# Patient Record
Sex: MALE | Race: WHITE | NOT HISPANIC OR LATINO | Employment: OTHER | URBAN - METROPOLITAN AREA
[De-identification: names, ages, dates, MRNs, and addresses within clinical notes are randomized per-mention and may not be internally consistent; named-entity substitution may affect disease eponyms.]

---

## 2017-02-16 ENCOUNTER — HOSPITAL ENCOUNTER (OUTPATIENT)
Dept: PHYSICAL THERAPY | Facility: CLINIC | Age: 67
Setting detail: THERAPIES SERIES
End: 2017-02-16
Attending: PSYCHIATRY & NEUROLOGY
Payer: MEDICARE

## 2017-02-16 PROCEDURE — 97116 GAIT TRAINING THERAPY: CPT | Mod: GP | Performed by: PHYSICAL THERAPIST

## 2017-02-16 PROCEDURE — 97161 PT EVAL LOW COMPLEX 20 MIN: CPT | Mod: GP | Performed by: PHYSICAL THERAPIST

## 2017-02-16 PROCEDURE — 97110 THERAPEUTIC EXERCISES: CPT | Mod: GP | Performed by: PHYSICAL THERAPIST

## 2017-02-16 PROCEDURE — 40000719 ZZHC STATISTIC PT DEPARTMENT NEURO VISIT: Performed by: PHYSICAL THERAPIST

## 2017-02-16 PROCEDURE — G8978 MOBILITY CURRENT STATUS: HCPCS | Mod: GP,CJ | Performed by: PHYSICAL THERAPIST

## 2017-02-16 PROCEDURE — G8979 MOBILITY GOAL STATUS: HCPCS | Mod: GP,CJ | Performed by: PHYSICAL THERAPIST

## 2017-02-16 ASSESSMENT — 6 MINUTE WALK TEST (6MWT): TOTAL DISTANCE WALKED (FT): 1605

## 2017-02-16 NOTE — PROGRESS NOTES
02/16/17 0800   Quick Adds   Type of Visit Initial OP PT Evaluation   General Information   Start of Care Date 02/16/17   Referring Physician Dr. Naima Fuller   Orders Evaluate and Treat as Indicated   Additional Orders Big Program   Order Date 01/31/17   Medical Diagnosis Parkinson's Disease   Onset of illness/injury or Date of Surgery (2010)   Surgical/Medical history reviewed Yes   Pertinent history of current problem (include personal factors and/or comorbidities that impact the POC) Dignosed with PD in 2010. Currently has c/o sitffness, decreased balance and overall slowness with movements. 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 and is enjoying senior living with family [place] and walking 3x/week at the mall with his wife   Current Community Support Family/friend caregiver   Patient role/Employment history Retired   Living environment [facility]   Home/Community Accessibility Comments 3 steps/15 steps within home and reports no difficulty   Assistive Devices Comments no AD   Patient/Family Goals Statement Decrease my stiffness and help my balance and slowness   General Information Comments Carbidopa/levidopa 4x/day- recently decreased dose by 1/2 due to unwanted involuntary facial movements   Fall Risk Screen   Fall screen completed by PT   Per patient - Fall 2 or more times in past year? No   Per patient - Fall with injury in past year? No   Timed Up and Go score (seconds) 6.3   Is patient a fall risk? No   System Outcome Measures   AM-PAC  Basic Mobility Score Level  (Lower scores equate to lower levels of function) 65.88   AM-PAC  Daily Activity Score Level  (Lower scores equate to lower levels of function) 61.18   AM-PAC  Applied Cognitive Score Level  (Lower scores equate to lower levels of function) 65.2   Pain   Patient currently in pain No   Pain comments no pain, just stiffness   Cognitive Status Examination   Orientation orientation to person,  place and time   Level of Consciousness alert   Follows Commands and Answers Questions 100% of the time   Personal Safety and Judgment intact   Memory intact   Observation   Observation Flat affect, reports his voice projection has decreased as a result of not speaking and doing lectures   Posture   Posture Forward head position   Range of Motion (ROM)   ROM Comment lacks full knee extension bilaterally, approximately less than 5 degrees.   Strength   Manual Muscle Testing Quick Adds No deficits were identified   Bed Mobility   Bed Mobility Comments Sit>supine 5.9s; supine>sit 6.0s and reports getting stuck at home if he's too close to the edge.   Transfer Skills   Transfer Comments Requires UE assist for transfers   Gait   Gait Comments Ambulates unaided to clinic, equal step and stride length, limited arm swing bilaterally   Gait Special Tests Six Minute Walk Test   Feet 1605 Feet   Comments Rates effort at 6-7/10   Balance   Balance Comments NBOS EO.EC x30 independently, increased sway with NBOS but remains standing independently   Sensory Examination   Sensory Perception Comments denies N/T   Coordination   Coordination Comments impaired as noted with arm swing during gait, incoordinated with steps   Planned Therapy Interventions   Planned Therapy Interventions balance training;gait training;motor coordination training;ROM;strengthening;stretching;transfer training;manual therapy   Clinical Impression   Criteria for Skilled Therapeutic Interventions Met yes, treatment indicated   PT Diagnosis Difficulty with gait   Influenced by the following impairments impaired coordination and arm swing, generalized slowness of movements, impaired flexiblity and stiffness with mobility, masked facial features    Functional limitations due to impairments difficulty with gait, bed transfers and mobility, difficulty moving into a faster speed.   Clinical Presentation Stable/Uncomplicated   Clinical Presentation Rationale  Progressing as expected   Clinical Decision Making (Complexity) Low complexity   Therapy Frequency other (see comments)  (4x/week per BIG protocol)   Predicted Duration of Therapy Intervention (days/wks) 4 weeks   Risk & Benefits of therapy have been explained Yes   Patient, Family & other staff in agreement with plan of care Yes   Education Assessment   Preferred Learning Style Listening   Barriers to Learning No barriers   Goal 1   Goal Identifier BED MOBILITY   Goal Description 1. Patient will decrease amount of time allowed to ext bed, supine>sit, in less than 5seconds in order to improve efficiency and independence at home.   Target Date 04/17/17   Goal 2   Goal Identifier ARM SWING   Goal Description 2. Patient will demonstrate equal arm swing during gait in order to resume a normal walking pattern   Target Date 04/17/17   Goal 3   Goal Identifier HEP   Goal Description 3. Patient will be independent in a home program including exercises specific to Parkinson's to ease stiffness and improve flexibility, reaction time and general strength   Target Date 04/17/17   Total Evaluation Time   Total Evaluation Time (Minutes) 30   Therapy Certification   Certification date from 02/16/17   Certification date to 04/17/17   Medical Diagnosis Parkinson's Disease

## 2017-02-16 NOTE — PROGRESS NOTES
[facility]        OUTPATIENT PHYSICAL THERAPY FUNCTIONAL EVALUATION  PLAN OF TREATMENT FOR OUTPATIENT REHABILITATION  (COMPLETE FOR INITIAL CLAIMS ONLY)  Patient's Last Name, First Name, M.I.  YOB: 1950  Steven Dye     Provider's Name   [facility]   Medical Record No.  [id number]     Start of Care Date:  02/16/17   Onset Date:   (2010)   Type:     _X__PT   ____OT  ____SLP Medical Diagnosis:  Parkinson's Disease     PT Diagnosis:  Difficulty with gait Visits from SOC:  1                              __________________________________________________________________________________  Plan of Treatment/Functional Goals:  balance training, gait training, motor coordination training, ROM, strengthening, stretching, transfer training, manual therapy           GOALS  BED MOBILITY  1. Patient will decrease amount of time allowed to exit bed, supine>sit, in less than 5seconds in order to improve efficiency and independence at home.  04/17/17    ARM SWING  2. Patient will demonstrate equal arm swing during gait in order to resume a normal walking pattern  04/17/17    HEP  3. Patient will be independent in a home program including exercises specific to Parkinson's to ease stiffness and improve flexibility, reaction time and general strength  04/17/17      Therapy Frequency:  other (see comments) (4x/week per BIG protocol) -->pt starting middle of March per schedule restrictions  Predicted Duration of Therapy Intervention:  4 weeks    Tish Fu, PT                                    I CERTIFY THE NEED FOR THESE SERVICES FURNISHED UNDER        THIS PLAN OF TREATMENT AND WHILE UNDER MY CARE .             Physician Signature               Date    X_____________________________________________________                      Certification Date From:  02/16/17    Certification Date To:  04/17/17    Referring Provider:  Dr. Naima Fuller    Initial Assessment  See Epic Evaluation- Start of Care Date: 02/16/17

## 2017-03-13 ENCOUNTER — HOSPITAL ENCOUNTER (OUTPATIENT)
Dept: PHYSICAL THERAPY | Facility: CLINIC | Age: 67
Setting detail: THERAPIES SERIES
End: 2017-03-13
Attending: PSYCHIATRY & NEUROLOGY
Payer: MEDICARE

## 2017-03-13 PROCEDURE — 97116 GAIT TRAINING THERAPY: CPT | Mod: GP | Performed by: PHYSICAL THERAPIST

## 2017-03-13 PROCEDURE — 40000719 ZZHC STATISTIC PT DEPARTMENT NEURO VISIT: Performed by: PHYSICAL THERAPIST

## 2017-03-13 PROCEDURE — 97110 THERAPEUTIC EXERCISES: CPT | Mod: GP | Performed by: PHYSICAL THERAPIST

## 2017-03-14 ENCOUNTER — HOSPITAL ENCOUNTER (OUTPATIENT)
Dept: OCCUPATIONAL THERAPY | Facility: CLINIC | Age: 67
Setting detail: THERAPIES SERIES
End: 2017-03-14
Attending: PSYCHIATRY & NEUROLOGY
Payer: MEDICARE

## 2017-03-14 PROCEDURE — 97166 OT EVAL MOD COMPLEX 45 MIN: CPT | Mod: GO | Performed by: OCCUPATIONAL THERAPIST

## 2017-03-14 PROCEDURE — 40000125 ZZHC STATISTIC OT OUTPT VISIT: Performed by: OCCUPATIONAL THERAPIST

## 2017-03-14 PROCEDURE — 97535 SELF CARE MNGMENT TRAINING: CPT | Mod: GO | Performed by: OCCUPATIONAL THERAPIST

## 2017-03-14 PROCEDURE — G8987 SELF CARE CURRENT STATUS: HCPCS | Mod: GO,CI | Performed by: OCCUPATIONAL THERAPIST

## 2017-03-14 PROCEDURE — G8988 SELF CARE GOAL STATUS: HCPCS | Mod: GO,CI | Performed by: OCCUPATIONAL THERAPIST

## 2017-03-14 NOTE — PROGRESS NOTES
[facility]          OUTPATIENT OCCUPATIONAL THERAPY  EVALUATION  PLAN OF TREATMENT FOR OUTPATIENT REHABILITATION  (COMPLETE FOR INITIAL CLAIMS ONLY)  Patient's Last Name, First Name, M.I.  YOB: 1950  Steven Dye                        Provider's Name  [facility] Medical Record No.  [id number]                               Onset Date:      (Diagnosed in 2010)   Start of Care Date:     03/14/17   Type:     ___PT   _X_OT   ___SLP Medical Diagnosis:     Parkinson's Disease                          OT Diagnosis:     decreased independence with ADLs Visits from SOC:  1   _________________________________________________________________________________  Plan of Treatment/Functional Goals:  ADL training, IADL training, Balance training, Coordination training, Self care/Home management, Strengthening, Stretching, Therapeutic activities     LSVT BIG program (2x/week shared with PT)              Goals  Goal Identifier: UE dressing  Goal Description: Patient will use increased (normal) amplitude UE movements and speed so that he can don his coat independently in less than 20 seconds (without LOB, without help of spouse, to get to work, etc)  Target Date: 04/13/17     Goal Identifier: UE Movement  Goal Description: Patient will demonstrate faster speed with using UE's to increase ability to reach into cupboard, reach into the fridge,  things from the floor, etc. safely, independently and in a timely manner.  Target Date: 04/13/17     Goal Identifier: FMC  Goal Description: Patient will independently use strategies (BIG movements and hand flicks) to increase coordination to assist with buttoning, dialing a telephone, and FM ADL's by completing the 9HPT in 25 seconds (R hand.)  Target Date: 04/13/17          Therapy Frequency: 2x/week     Predicted Duration of Therapy  Intervention (days/wks): 4  Mignon Katz OT          I CERTIFY THE NEED FOR THESE SERVICES FURNISHED UNDER        THIS PLAN OF TREATMENT AND WHILE UNDER MY CARE .             Physician Signature               Date    X_____________________________________________________                       ,    Certification date from: 03/14/17, Certification date to: 04/13/17               Referring Physician: Dr. Naima Fuller     Initial Assessment        See Epic Evaluation      Start Of Care Date: 03/14/17

## 2017-03-14 NOTE — PROGRESS NOTES
03/14/17 1300   Quick Adds   Quick Adds Certification   Type of Visit Initial Outpatient Occupational Therapy Evaluation   General Information   Start Of Care Date 03/14/17   Referring Physician Dr. Naima Fuller   Orders Evaluate and treat as indicated   Other Orders SLP and PT for Big and Loud program   Orders Date 03/01/17   Medical Diagnosis Parkinson's Disease   Onset of Illness/Injury or Date of Surgery (Diagnosed in 2010)   Surgical/Medical History Reviewed Yes   Additional Occupational Profile Info/Pertinent History of Current Problem Patient referred to outpatient OT for the LSVT BIG program.  Patient was dignosed with PD in 2010. Currently he complains of increased stiffness, decreased balance, incoordination, difficulty donning a coat and overall slowness with movements and speech.   He is on Carbidopa/levidopa 4x/day- (reports recently decreasing dose by 1/2 due to unwanted involuntary facial movements).      Comments/Observations Patient talks softly, flat affect. Reports taking his medications 30 minutes ago.   Role/Living Environment   Current Community Support Family/friend caregiver  (wife)   Patient role/Employment history Retired  (, retired 1 year ago.)   Community/Avocational Activities Patient enjoys family history research, is sedentary and uses the computer daily.  Patient feels he has given up playing sports due to balance (used to play tennis).     Current Living Environment House   Number of Stairs to Enter Home 3   Number of Stairs Within Home 15   Primary Bathroom Set Up/Equipment Tub/Shower combo   Home/Community Accessibility Comments Reports no difficulties with mobility within his home.   Prior Level - Transfers Independent   Prior Level - Ambulation Independent   Prior Level - ADLS Independent   Prior Responsibilities - IADL Meal Preparation;Laundry;Housekeeping;Driving;Shopping;Medication management;Yardwork;Finances   Prior Level Comments Patient reports that  "flipping burgers when grilling is difficult.  Patietn shovels snow, vacuums, cleans his own bathroom.      Current Assistive Devices - Mobility (none)   Current Assistive Devices - ADL (none)   Patient/family Goals Statement To get rid of symptoms of PD, improve my balance and get quicker   Pain   Patient currently in pain No   System Outcome Measures   AM-PAC  Basic Mobility Score Level  (Lower scores equate to lower levels of function) 65.88   AM-PAC  Daily Activity Score Level  (Lower scores equate to lower levels of function) 61.18   AM-PAC  Applied Cognitive Score Level  (Lower scores equate to lower levels of function) 65.2   Cognitive Status Examination   Orientation Orientation to person, place and time   Level of Consciousness Alert   Follows Commands and Answers Questions 100% of the time;Able to follow single-step instructions   Personal Safety and Judgment Intact   Memory Intact   Cognitive Comment Patient doesn't feel PD has affected his cognition.  He reports difficulty remembering names.  He writes things down and has a to-do list.  \"I didn't know that PD affected my reading ability.  I never knew that it would slow down my ability to read.\"  Will continue to monitor.   Visual Perception   Visual Perception (wears contacts)   Visual Perception Comments No new changes.    Sensation   Upper Extremity Sensory Examination No deficits were identified   Range of Motion (ROM)   ROM Quick Adds No deficits identified   Strength   Strength No deficits were identified   Hand Strength   Hand Dominance Right   Left Hand  (pounds) 87 pounds   Right Hand  (pounds) 100 pounds   Hand Strength Comments Has a harder time opening up jars.  Both R and L  strength falls WNL's for his age group.   Coordination   Upper Extremity Coordination Right UE impaired   Left Hand, Nine Hole Peg Test (seconds) 24   Right Hand, Nine Hole Peg Test (seconds) 33   Coordination Comments Min/mod compensation through shoulders " for FMC tasks.  R hand FMC falls more than 2 standard deviations below the mean for his age group.     Functional Mobility   Functional Mobility Comments Small but reciprocal arm swing.  Little head turns with mobility.   Transfer Skill   Level of [place]: Transfers independent   Tub/Shower Transfer   Tub/Shower Transfer Comments Reports no difficulty   Bathing   Level of [place] - Bathing independent   Upper Body Dressing   Level of [place]: Dress Upper Body independent   Upper Body Dressing Comments Reports that he is a little slow getting his L arm through his shirt, reports difficulty with small buttons and donning his coat.   Lower Body Dressing   Level of [place]: Dress Lower Body independent   Lower Body Dressing Comments Sits on EOB to don clothes because he is fearful of falling with standing.   Toileting   Level of [place]: Toilet independent   Grooming   Level of [place]: Grooming independent   Eating/Self-Feeding   Level of [place]: Eating independent   Eating/Self Feeding Comments Reports that he leans forward to the sliverware vs. bringing it to him.     Activity Tolerance   Activity Tolerance Reports increased fatigue through hands with repetition of movements (i.e. using a screwdriver.)   Planned Therapy Interventions   Planned Therapy Interventions ADL training;IADL training;Balance training;Coordination training;Self care/Home management;Strengthening;Stretching;Therapeutic activities   Intervention Comments LSVT BIG program (2x/week shared with PT)   OT Goal 1   Goal Identifier UE dressing   Goal Description Patient will use increased (normal) amplitude UE movements and speed so that he can don his coat independently in less than 20 seconds (without LOB, without help of spouse, to get to work, etc)   Target Date 04/13/17   OT Goal 2   Goal Identifier UE Movement   Goal Description Patient will demonstrate faster speed with using UE's to increase ability to reach  into cupboard, reach into the fridge,  things from the floor, etc. safely, independently and in a timely manner.   Target Date 04/13/17   OT Goal 3   Goal Identifier FM   Goal Description Patient will independently use strategies (BIG movements and hand flicks) to increase coordination to assist with buttoning, dialing a telephone, and FM ADL's by completing the 9HPT in 25 seconds (R hand.)   Target Date 04/13/17   Clinical Impression   Criteria for Skilled Therapeutic Interventions Met Yes, treatment indicated   OT Diagnosis decreased independence with ADLs   Influenced by the following impairments PD symptoms, slowness, decreased balance, decreased coordinatiopn   Assessment of Occupational Performance 1-3 Performance Deficits   Identified Performance Deficits difficulty with all ADLs, decreased participation in leisure activities   Clinical Decision Making (Complexity) Moderate complexity   Therapy Frequency 2x/week   Predicted Duration of Therapy Intervention (days/wks) 4   Risks and Benefits of Treatment have been explained. Yes   Patient, Family & other staff in agreement with plan of care Yes   Education Assessment   Barriers To Learning No Barriers   Therapy Certification   Certification date from 03/14/17   Certification date to 04/13/17   Total Evaluation Time   Total Evaluation Time 30

## 2017-03-15 ENCOUNTER — HOSPITAL ENCOUNTER (OUTPATIENT)
Dept: PHYSICAL THERAPY | Facility: CLINIC | Age: 67
Setting detail: THERAPIES SERIES
End: 2017-03-15
Attending: PSYCHIATRY & NEUROLOGY
Payer: MEDICARE

## 2017-03-15 PROCEDURE — 40000719 ZZHC STATISTIC PT DEPARTMENT NEURO VISIT: Performed by: PHYSICAL THERAPIST

## 2017-03-15 PROCEDURE — 97110 THERAPEUTIC EXERCISES: CPT | Mod: GP | Performed by: PHYSICAL THERAPIST

## 2017-03-15 PROCEDURE — 97116 GAIT TRAINING THERAPY: CPT | Mod: GP | Performed by: PHYSICAL THERAPIST

## 2017-03-17 ENCOUNTER — HOSPITAL ENCOUNTER (OUTPATIENT)
Dept: OCCUPATIONAL THERAPY | Facility: CLINIC | Age: 67
Setting detail: THERAPIES SERIES
End: 2017-03-17
Attending: PSYCHIATRY & NEUROLOGY
Payer: MEDICARE

## 2017-03-17 PROCEDURE — 97530 THERAPEUTIC ACTIVITIES: CPT | Mod: GO | Performed by: OCCUPATIONAL THERAPIST

## 2017-03-17 PROCEDURE — 97535 SELF CARE MNGMENT TRAINING: CPT | Mod: GO,59 | Performed by: OCCUPATIONAL THERAPIST

## 2017-03-17 PROCEDURE — 40000125 ZZHC STATISTIC OT OUTPT VISIT: Performed by: OCCUPATIONAL THERAPIST

## 2017-03-17 PROCEDURE — 97110 THERAPEUTIC EXERCISES: CPT | Mod: GO | Performed by: OCCUPATIONAL THERAPIST

## 2017-03-20 ENCOUNTER — HOSPITAL ENCOUNTER (OUTPATIENT)
Dept: PHYSICAL THERAPY | Facility: CLINIC | Age: 67
Setting detail: THERAPIES SERIES
End: 2017-03-20
Attending: PSYCHIATRY & NEUROLOGY
Payer: MEDICARE

## 2017-03-20 ENCOUNTER — ALLIED HEALTH/NURSE VISIT (OUTPATIENT)
Dept: CARDIOLOGY | Facility: CLINIC | Age: 67
End: 2017-03-20
Payer: COMMERCIAL

## 2017-03-20 DIAGNOSIS — Z95.0 CARDIAC PACEMAKER IN SITU: Primary | ICD-10-CM

## 2017-03-20 PROCEDURE — 93293 PM PHONE R-STRIP DEVICE EVAL: CPT | Performed by: INTERNAL MEDICINE

## 2017-03-20 PROCEDURE — 97110 THERAPEUTIC EXERCISES: CPT | Mod: GP | Performed by: PHYSICAL THERAPIST

## 2017-03-20 PROCEDURE — 97112 NEUROMUSCULAR REEDUCATION: CPT | Mod: GP | Performed by: PHYSICAL THERAPIST

## 2017-03-20 PROCEDURE — 97116 GAIT TRAINING THERAPY: CPT | Mod: GP | Performed by: PHYSICAL THERAPIST

## 2017-03-20 PROCEDURE — 40000719 ZZHC STATISTIC PT DEPARTMENT NEURO VISIT: Performed by: PHYSICAL THERAPIST

## 2017-03-20 NOTE — MR AVS SNAPSHOT
After Visit Summary   3/20/2017    Steven Dye    MRN: [id number]           Patient Information     Date Of Birth          1950        Visit Information        Provider Department      3/20/2017 9:30 AM CASEY TECH1 [facility] [place]        Today's Diagnoses     Cardiac pacemaker in situ    -  1       Follow-ups after your visit        Your next 10 appointments already scheduled     Mar 20, 2017 10:00 AM CDT   Big and loud treatment with Tish Fu, PT   [facility] Physical Therapy ([facility])    [address] [place] [address]  [address]  [place] [address]   [phone]            Mar 21, 2017 11:30 AM CDT   Big and loud treatment with Mignon Katz, OT   [facility] Occupational Therapy ([facility])    [address] [place] [address]  [address]  [place] [address]   [phone]            Mar 22, 2017 11:30 AM CDT   Big and loud treatment with Tish Fu, PT   [facility] Physical Therapy ([facility])    [address] [place] [address]  [address]  [place] [address]   [phone]            Mar 24, 2017  8:00 AM CDT   Big and loud treatment with Mignon Katz, OT   [facility] Occupational Therapy ([facility])    [address] [place] [address]  [address]  [place] [address]   [phone]            Mar 27, 2017 10:00 AM CDT   Big and loud treatment with Tish Fu, PT   [facility] ([facility])    [address] [place] [address] [address]  [place] [address]   [phone]            Mar 28, 2017  2:00 PM CDT   Big and loud treatment with Mignon Katz, OT   [facility] Occupational Therapy ([facility])    [address] [place] [address]  [address]  [place] [address]   [phone]            Mar 29, 2017 10:30 AM CDT   Big and loud treatment with Tish Fu, PT   [facility] ([facility])    [address] [place] [address]  [address]  [place] [address]   [phone]          "   Mar 30, 2017 10:30 AM CDT   Big and loud treatment with Tishtd Fu, PT   [facility] Physical Therapy ([facility])    [address]  [address]  [place] [address]   [phone]            Apr 03, 2017 10:30 AM CDT   Big and loud treatment with Tishtd Fu, PT   [facility] Physical Therapy ([facility])    [address]  [address]  [place] [address]   [phone]            Apr 05, 2017 10:30 AM CDT   Big and loud treatment with Tish Fu, PT   [facility] Physical Therapy ([facility])    [address]  [address]  [place] [address]   [phone]              Who to contact     If you have questions or need follow up information about today's clinic visit or your schedule please contact [facility] AT [place] directly at [phone].  Normal or non-critical lab and imaging results will be communicated to you by [employer] K[employer]art, letter or phone within 4 business days after the clinic has received the results. If you do not hear from us within 7 days, please contact the clinic through [employer]itt[employer] or phone. If you have a critical or abnormal lab result, we will notify you by phone as soon as possible.  Submit refill requests through Kitchfix or call your pharmacy and they will forward the refill request to us. Please allow 3 business days for your refill to be completed.          Additional Information About Your Visit        Kitchfix Information     Kitchfix lets you send messages to your doctor, view your test results, renew your prescriptions, schedule appointments and more. To sign up, go to [email or web address] . Click on \"Log in\" on the left side of the screen, which will take you to the Welcome page. Then click on \"Sign up Now\" on the right side of the page.     You will be asked to enter the access code listed below, as well as some personal information. Please follow the directions to create your username and " password.     Your access code is: [email or web address]  Expires: 2017  9:33 AM     Your access code will  in 90 days. If you need help or a new code, please call your [place] clinic or [phone].        Care EveryWhere ID     This is your Care EveryWhere ID. This could be used by other organizations to access your [place] medical records  [id number]         Blood Pressure from Last 3 Encounters:   16 95/65   10/30/15 112/70   14 110/66    Weight from Last 3 Encounters:   16 95 kg (209 lb 6.4 oz)   10/30/15 95.4 kg (210 lb 6.4 oz)   14 95.7 kg (211 lb)              We Performed the Following     PM PHONE R STRIP EVAL UP TO 90 DAYS ([phone])        Primary Care Provider Office Phone # Fax #    Robert Flores [phone]       [facility] [address] [facility] [address]        Thank you!     Thank you for choosing [facility] AT [place]  for your care. Our goal is always to provide you with excellent care. Hearing back from our patients is one way we can continue to improve our services. Please take a few minutes to complete the written survey that you may receive in the mail after your visit with us. Thank you!             Your Updated Medication List - Protect others around you: Learn how to safely use, store and throw away your medicines at www.disposemymeds.org.          This list is accurate as of: 3/20/17  9:33 AM.  Always use your most recent med list.                   Brand Name Dispense Instructions for use    amLODIPine 5 MG tablet    NORVASC     Take 5 mg by mouth daily       carbidopa-levodopa  MG per tablet    SINEMET     Take 1 tablet by mouth 4 times daily       lisinopril 20 MG tablet    PRINIVIL/ZESTRIL     Take 20 mg by mouth       metoprolol 50 MG 24 hr tablet    TOPROL XL    30 tablet    Take 1 tablet (50 mg) by mouth daily

## 2017-03-20 NOTE — PROGRESS NOTES
~ 90 day office teletrace ~  Appropriate AS/ at time of check. Magnet response WNL. Six month threshold scheduled in June. Jackie BEATTY

## 2017-03-21 ENCOUNTER — HOSPITAL ENCOUNTER (OUTPATIENT)
Dept: OCCUPATIONAL THERAPY | Facility: CLINIC | Age: 67
Setting detail: THERAPIES SERIES
End: 2017-03-21
Attending: PSYCHIATRY & NEUROLOGY
Payer: MEDICARE

## 2017-03-21 PROCEDURE — 97535 SELF CARE MNGMENT TRAINING: CPT | Mod: GO,59 | Performed by: OCCUPATIONAL THERAPIST

## 2017-03-21 PROCEDURE — 97110 THERAPEUTIC EXERCISES: CPT | Mod: GO | Performed by: OCCUPATIONAL THERAPIST

## 2017-03-21 PROCEDURE — 40000125 ZZHC STATISTIC OT OUTPT VISIT: Performed by: OCCUPATIONAL THERAPIST

## 2017-03-21 PROCEDURE — 97530 THERAPEUTIC ACTIVITIES: CPT | Mod: GO | Performed by: OCCUPATIONAL THERAPIST

## 2017-03-22 ENCOUNTER — HOSPITAL ENCOUNTER (OUTPATIENT)
Dept: PHYSICAL THERAPY | Facility: CLINIC | Age: 67
Setting detail: THERAPIES SERIES
End: 2017-03-22
Attending: PSYCHIATRY & NEUROLOGY
Payer: MEDICARE

## 2017-03-22 PROCEDURE — 97116 GAIT TRAINING THERAPY: CPT | Mod: GP | Performed by: PHYSICAL THERAPIST

## 2017-03-22 PROCEDURE — 97112 NEUROMUSCULAR REEDUCATION: CPT | Mod: GP | Performed by: PHYSICAL THERAPIST

## 2017-03-22 PROCEDURE — 97110 THERAPEUTIC EXERCISES: CPT | Mod: GP | Performed by: PHYSICAL THERAPIST

## 2017-03-22 PROCEDURE — 40000719 ZZHC STATISTIC PT DEPARTMENT NEURO VISIT: Performed by: PHYSICAL THERAPIST

## 2017-03-24 ENCOUNTER — HOSPITAL ENCOUNTER (OUTPATIENT)
Dept: OCCUPATIONAL THERAPY | Facility: CLINIC | Age: 67
Setting detail: THERAPIES SERIES
End: 2017-03-24
Attending: PSYCHIATRY & NEUROLOGY
Payer: MEDICARE

## 2017-03-24 PROCEDURE — 40000125 ZZHC STATISTIC OT OUTPT VISIT: Performed by: OCCUPATIONAL THERAPIST

## 2017-03-24 PROCEDURE — 97530 THERAPEUTIC ACTIVITIES: CPT | Mod: GO | Performed by: OCCUPATIONAL THERAPIST

## 2017-03-24 PROCEDURE — 97110 THERAPEUTIC EXERCISES: CPT | Mod: GO | Performed by: OCCUPATIONAL THERAPIST

## 2017-03-27 ENCOUNTER — HOSPITAL ENCOUNTER (OUTPATIENT)
Dept: SPEECH THERAPY | Facility: CLINIC | Age: 67
Setting detail: THERAPIES SERIES
End: 2017-03-27
Attending: PSYCHIATRY & NEUROLOGY
Payer: MEDICARE

## 2017-03-27 ENCOUNTER — HOSPITAL ENCOUNTER (OUTPATIENT)
Dept: PHYSICAL THERAPY | Facility: CLINIC | Age: 67
Setting detail: THERAPIES SERIES
End: 2017-03-27
Attending: PSYCHIATRY & NEUROLOGY
Payer: MEDICARE

## 2017-03-27 PROCEDURE — 92524 BEHAVRAL QUALIT ANALYS VOICE: CPT | Mod: GN | Performed by: SPEECH-LANGUAGE PATHOLOGIST

## 2017-03-27 PROCEDURE — 40000719 ZZHC STATISTIC PT DEPARTMENT NEURO VISIT: Performed by: PHYSICAL THERAPIST

## 2017-03-27 PROCEDURE — G9172 VOICE GOAL STATUS: HCPCS | Mod: GN,CI | Performed by: SPEECH-LANGUAGE PATHOLOGIST

## 2017-03-27 PROCEDURE — 97110 THERAPEUTIC EXERCISES: CPT | Mod: GP | Performed by: PHYSICAL THERAPIST

## 2017-03-27 PROCEDURE — 97116 GAIT TRAINING THERAPY: CPT | Mod: GP | Performed by: PHYSICAL THERAPIST

## 2017-03-27 PROCEDURE — G9171 VOICE CURRENT STATUS: HCPCS | Mod: GN,CI | Performed by: SPEECH-LANGUAGE PATHOLOGIST

## 2017-03-27 PROCEDURE — 40000211 ZZHC STATISTIC SLP  DEPARTMENT VISIT: Performed by: SPEECH-LANGUAGE PATHOLOGIST

## 2017-03-27 NOTE — PROGRESS NOTES
[facility]          OUTPATIENT SPEECH LANGUAGE PATHOLOGY VOICE EVALUATION  PLAN OF TREATMENT FOR OUTPATIENT REHABILITATION  (COMPLETE FOR INITIAL CLAIMS ONLY)    Patient's Last Name, First Name, M.I.  YOB: 1950  HardikSteven Tandred                        Provider s Name: [facility] Medical Record No.  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 SLP feels he will benefit, instead, from a similar but modified program to increase strength of voice through loud exercises and breath support training, and to train improved consistency of volume across tasks      Goals     1. Goal Identifier: Volume - structured       Goal Description: Patient will maintain a vocal volume of >70dB SPL with min cues across a 10 minute structured speech task (e.g. reading aloud) to meet daily communication needs.        Target Date: 06/25/17   2. Goal Identifier: Volume - unstructured       Goal Description: Patient will reach a target volume of >70 dB SPL on a spontaneous, unstructured speech task for up to 10 minutes (e.g. conversation) with min cues to meet daily expressive communication needs.        Target Date: 06/25/17   3. Goal Identifier: Pitch       Goal Description: Patient will increase pitch change on targeted emotional statements in 90% of opportunities, as judged by SLP, to meet daily pragmatic communication needs.        Target Date: 06/25/17   4. Goal Identifier: Voice hygiene       Goal Description: Patient will  learn and demostrate use of 3 vocal hygiene strategies to improve voice quality for everyday speech tasks.        Target Date: 06/25/17       Ivelisse Saeed, SLP       I CERTIFY THE NEED FOR THESE SERVICES FURNISHED UNDER        THIS PLAN OF TREATMENT AND WHILE UNDER MY CARE .             Physician Signature               Date    X_____________________________________________________                    Dr. Naima Fuller  Certification Date From:  03/27/17  Certification Date To:  06/25/17                 Initial Assessment        See Epic Evaluation Start of Care

## 2017-03-27 NOTE — PROGRESS NOTES
"[facility] Outpatient Voice Evaluation   03/27/17 0900   General Information   Type Of Visit Initial   Start Of Care Date 03/27/17   Referring Physician Dr. Naima Fuller   Orders Evaluate And Treat   Orders Comment Big and Loud Program   Medical Diagnosis Parkinson's disease   Onset Of Illness/injury Or Date Of Surgery 03/01/17  (Order, diagnosed with PD in 2010)   Avocational voice uses Singing, socializing, [employer]   Pertinent History Of Current Problem 66-year-old male referred for LSVT BIG and LOUD program for treatment of Parkinson's related symptoms. Patient describing the following voice and speech symptoms: reduced breath support for speech, excessive effort to achieve loudness, and inconsistency in voice quality.     Prior Level of Functioning No previous problems.   Patient Role/employment History Retired  (former  at [employer])   Living environment (Lives with wife)   General Observations Patient reports he has been diligent about complying with LSVT Big home program expectations   Patient/family Goals \"To be more consistent.\"    General Information Comments Patient has never had voice therapy before. Reports he quit singing in choir but may wish to pursue something like that again in the future.    Personal Rating / Voice Use Rating   Describe the amount of voice use required for your job Very little   Describe the intensity of voice use required for your job Soft and quiet   Describe the amount of typical non-work voice use Very little   Describe the intensity of typical non-work voice use Moderate   Evaluation Results   Voice Observations Vocal quality notable for hoarseness and breathiness. Structured tasks such as reading and recorded conversation were WNL for volume, however, patient does have some drops in volume when answering off-the-cuff questions or offering spontaneous responses. Patient able to increase volume without excessive strain.  Range on pitch " tasks is good, but patient endorsing the both high and low ends of range are challenging. Speech intelligibility is mildly reduced due to smaller movement of articulators.     Voice Profile during conversation, 1 min monologue and paragraph reading   Volume Too quiet  (approx. 30% of the time.)   Comments Voice Handicap Index score = 41   Compression Strength of Vocal Folds / Larynx Muscles   Vowels average volume dB 72  (16 seconds)   Reading aloud average volume dB 74   Conversation average volume dB 74   Efficiency of increased volume Relaxed throat   Function of Lengtheners / Shorteners (CT and TA Muscles)   Pitch glides Other  (WNL, but patient reporting difficulty with upper and lower)   General Therapy Interventions   Planned Therapy Interventions Voice   Voice Larynx strengthening;Voice quality/pitch or volume tasks   Intervention Comments Patient may not need entire LSVT LOUD protocol at this time. SLP feels he will benefit, instead, from a modified program to increase strength of voice through exercises to promote loudness and breath support training, and to train improved consistency of volume across tasks.   Impressions and Recommendations   Communication Diagnosis Mild hypokinetic dysarthria   Summary Mr. 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 These deficits reduce his overall speech intelligibility approximately 30% of the time. Patient is highly motivated to improve communication. At present, SLP does not feel patient requires full LSVT LOUD intervention. Rather, recommending a similar treatment approach with less intensity and focusing mainly on building strength to sustain good voice volume, improving consistency of volume across tasks, and improving pitch to counter monotone expression. RECOMMEND: Skilled voice therapy intervention 3x/week for 2 weeks,  then 2x/week for 2 more weeks, then 1x/week for 6 weeks to meet goals below. Patient to be seen 60 mins per session.    Prognosis  Good with intervention   Risks and Benefits of Treatment have been explained. Yes   Patient & /or Caregiver  in agreement with plan of care Yes   Patient Education Mr. Dye was verbally educated on treatment plan.    Educational Assessment   Barriers to Learning No barriers   Preferred Learning Style Listening;Reading;Demonstration;Pictures / Video   Voice Goals   Voice Goals 1;2;3;4   Voice Goal 1   Goal Identifier Volume - structured   Goal Description Patient will maintain a vocal volume of >70dB SPL with min cues across a 10 minute structured speech task (e.g. reading aloud) to meet daily communication needs.    Target Date 06/25/17   Voice Goal 2   Goal Identifier Volume - unstructured   Goal Description Patient will reach a target volume of >70 dB SPL on a spontaneous, unstructured speech task for up to 10 minutes (e.g. conversation) with min cues to meet daily expressive communication needs.    Target Date 06/25/17   Voice Goal 3   Goal Identifier Pitch   Goal Description Patient will increase pitch change to meet targeted range on emotional statements in 90% of opportunities, as judged by SLP, to meet daily pragmatic communication needs.    Target Date 06/25/17   Voice Goal 4   Goal Identifier Voice hygiene   Goal Description Patient will learn and demonstrate use of 3 vocal hygiene strategies to improve voice quality for everyday speech tasks.    Target Date 06/25/17   Total Session Time   Total Session Time 35   Total Evaluation Time 30   Therapy Certification   Certification date from 03/27/17   Certification date to 06/25/17   Medical Diagnosis Parkinson's disease   SLP Medicare Only G-code   G-code Voice   Voice   Voice: Current Status , Goal , Discharge -Yjpy Only-Modifier the same for all G-codes CI: 1-19% impairment   Voice: Current & Discharge Modifier  Rationale-Eval Only Goal: CI 1-19% per patient report, SLP evaluation and observations     Thank you for your referral of this patient.      Ivelisse Saeed MA, CCC-SLP  Speech-Language Pathology  [facility]  Phone: [phone]  Pager: [phone]

## 2017-03-28 ENCOUNTER — HOSPITAL ENCOUNTER (OUTPATIENT)
Dept: OCCUPATIONAL THERAPY | Facility: CLINIC | Age: 67
Setting detail: THERAPIES SERIES
End: 2017-03-28
Attending: PSYCHIATRY & NEUROLOGY
Payer: MEDICARE

## 2017-03-28 PROCEDURE — 97530 THERAPEUTIC ACTIVITIES: CPT | Mod: GO | Performed by: OCCUPATIONAL THERAPIST

## 2017-03-28 PROCEDURE — 97110 THERAPEUTIC EXERCISES: CPT | Mod: GO | Performed by: OCCUPATIONAL THERAPIST

## 2017-03-28 PROCEDURE — 40000125 ZZHC STATISTIC OT OUTPT VISIT: Performed by: OCCUPATIONAL THERAPIST

## 2017-03-29 ENCOUNTER — HOSPITAL ENCOUNTER (OUTPATIENT)
Dept: PHYSICAL THERAPY | Facility: CLINIC | Age: 67
Setting detail: THERAPIES SERIES
End: 2017-03-29
Attending: PSYCHIATRY & NEUROLOGY
Payer: MEDICARE

## 2017-03-29 PROCEDURE — 97110 THERAPEUTIC EXERCISES: CPT | Mod: GP | Performed by: PHYSICAL THERAPIST

## 2017-03-29 PROCEDURE — 40000719 ZZHC STATISTIC PT DEPARTMENT NEURO VISIT: Performed by: PHYSICAL THERAPIST

## 2017-03-29 PROCEDURE — 97116 GAIT TRAINING THERAPY: CPT | Mod: GP | Performed by: PHYSICAL THERAPIST

## 2017-03-30 ENCOUNTER — HOSPITAL ENCOUNTER (OUTPATIENT)
Dept: SPEECH THERAPY | Facility: CLINIC | Age: 67
Setting detail: THERAPIES SERIES
End: 2017-03-30
Attending: PSYCHIATRY & NEUROLOGY
Payer: MEDICARE

## 2017-03-30 ENCOUNTER — HOSPITAL ENCOUNTER (OUTPATIENT)
Dept: PHYSICAL THERAPY | Facility: CLINIC | Age: 67
Setting detail: THERAPIES SERIES
End: 2017-03-30
Attending: PSYCHIATRY & NEUROLOGY
Payer: MEDICARE

## 2017-03-30 PROCEDURE — 92507 TX SP LANG VOICE COMM INDIV: CPT | Mod: GN | Performed by: SPEECH-LANGUAGE PATHOLOGIST

## 2017-03-30 PROCEDURE — 97112 NEUROMUSCULAR REEDUCATION: CPT | Mod: GP | Performed by: PHYSICAL THERAPIST

## 2017-03-30 PROCEDURE — 97116 GAIT TRAINING THERAPY: CPT | Mod: GP | Performed by: PHYSICAL THERAPIST

## 2017-03-30 PROCEDURE — 40000719 ZZHC STATISTIC PT DEPARTMENT NEURO VISIT: Performed by: PHYSICAL THERAPIST

## 2017-03-30 PROCEDURE — 97110 THERAPEUTIC EXERCISES: CPT | Mod: GP,59 | Performed by: PHYSICAL THERAPIST

## 2017-03-30 PROCEDURE — 40000211 ZZHC STATISTIC SLP  DEPARTMENT VISIT: Performed by: SPEECH-LANGUAGE PATHOLOGIST

## 2017-03-31 ENCOUNTER — HOSPITAL ENCOUNTER (OUTPATIENT)
Dept: SPEECH THERAPY | Facility: CLINIC | Age: 67
Setting detail: THERAPIES SERIES
End: 2017-03-31
Attending: PSYCHIATRY & NEUROLOGY
Payer: MEDICARE

## 2017-03-31 PROCEDURE — 40000211 ZZHC STATISTIC SLP  DEPARTMENT VISIT: Performed by: SPEECH-LANGUAGE PATHOLOGIST

## 2017-03-31 PROCEDURE — 92507 TX SP LANG VOICE COMM INDIV: CPT | Mod: GN | Performed by: SPEECH-LANGUAGE PATHOLOGIST

## 2017-04-03 ENCOUNTER — HOSPITAL ENCOUNTER (OUTPATIENT)
Dept: SPEECH THERAPY | Facility: CLINIC | Age: 67
Setting detail: THERAPIES SERIES
End: 2017-04-03
Attending: PSYCHIATRY & NEUROLOGY
Payer: MEDICARE

## 2017-04-03 ENCOUNTER — HOSPITAL ENCOUNTER (OUTPATIENT)
Dept: PHYSICAL THERAPY | Facility: CLINIC | Age: 67
Setting detail: THERAPIES SERIES
End: 2017-04-03
Attending: PSYCHIATRY & NEUROLOGY
Payer: MEDICARE

## 2017-04-03 PROCEDURE — 97110 THERAPEUTIC EXERCISES: CPT | Mod: GP | Performed by: PHYSICAL THERAPIST

## 2017-04-03 PROCEDURE — 40000211 ZZHC STATISTIC SLP  DEPARTMENT VISIT: Performed by: SPEECH-LANGUAGE PATHOLOGIST

## 2017-04-03 PROCEDURE — 40000719 ZZHC STATISTIC PT DEPARTMENT NEURO VISIT: Performed by: PHYSICAL THERAPIST

## 2017-04-03 PROCEDURE — 92507 TX SP LANG VOICE COMM INDIV: CPT | Mod: GN | Performed by: SPEECH-LANGUAGE PATHOLOGIST

## 2017-04-03 PROCEDURE — 97112 NEUROMUSCULAR REEDUCATION: CPT | Mod: GP | Performed by: PHYSICAL THERAPIST

## 2017-04-04 ENCOUNTER — HOSPITAL ENCOUNTER (OUTPATIENT)
Dept: SPEECH THERAPY | Facility: CLINIC | Age: 67
Setting detail: THERAPIES SERIES
End: 2017-04-04
Attending: PSYCHIATRY & NEUROLOGY
Payer: MEDICARE

## 2017-04-04 PROCEDURE — 40000211 ZZHC STATISTIC SLP  DEPARTMENT VISIT: Performed by: SPEECH-LANGUAGE PATHOLOGIST

## 2017-04-04 PROCEDURE — 92507 TX SP LANG VOICE COMM INDIV: CPT | Mod: GN | Performed by: SPEECH-LANGUAGE PATHOLOGIST

## 2017-04-05 ENCOUNTER — HOSPITAL ENCOUNTER (OUTPATIENT)
Dept: PHYSICAL THERAPY | Facility: CLINIC | Age: 67
Setting detail: THERAPIES SERIES
End: 2017-04-05
Attending: PSYCHIATRY & NEUROLOGY
Payer: MEDICARE

## 2017-04-05 ENCOUNTER — HOSPITAL ENCOUNTER (OUTPATIENT)
Dept: SPEECH THERAPY | Facility: CLINIC | Age: 67
Setting detail: THERAPIES SERIES
End: 2017-04-05
Attending: PSYCHIATRY & NEUROLOGY
Payer: MEDICARE

## 2017-04-05 PROCEDURE — 40000719 ZZHC STATISTIC PT DEPARTMENT NEURO VISIT: Performed by: PHYSICAL THERAPIST

## 2017-04-05 PROCEDURE — 97110 THERAPEUTIC EXERCISES: CPT | Mod: GP,59 | Performed by: PHYSICAL THERAPIST

## 2017-04-05 PROCEDURE — 40000211 ZZHC STATISTIC SLP  DEPARTMENT VISIT: Performed by: SPEECH-LANGUAGE PATHOLOGIST

## 2017-04-05 PROCEDURE — 97112 NEUROMUSCULAR REEDUCATION: CPT | Mod: GP,59 | Performed by: PHYSICAL THERAPIST

## 2017-04-05 PROCEDURE — 92507 TX SP LANG VOICE COMM INDIV: CPT | Mod: GN | Performed by: SPEECH-LANGUAGE PATHOLOGIST

## 2017-04-05 NOTE — PROGRESS NOTES
Outpatient Physical Therapy Progress Note     Patient: Steven Dye  : 1950    Beginning/End Dates of Reporting Period:  17 to 2017    Referring Provider: Dr. Naima Fuller    Therapy Diagnosis: difficulty with gait     Client Self Report: I'm doing pretty well    Goals:  Goal Identifier BED MOBILITY   Goal Description 1. Patient will decrease amount of time allowed to ext bed, supine>sit, in less than 5seconds in order to improve efficiency and independence at home.   Target Date 17   Date Met  17   Progress: GOAL MET     Goal Identifier ARM SWING   Goal Description 2. Patient will demonstrate equal arm swing during gait in order to resume a normal walking pattern   Target Date 17   Date Met      Progress:           Progress Toward Goals:   Progress this reporting period: 1 of 2 goals met and patient has made excellent progress in his 10 therapy sessions since evaluation. Tim continues to improve his awareness in his left arm swing and is incorporating the principles of the LVST BIG program into his daily life. He has one additional treatment session before discharge.    Plan:  Continue therapy per current plan of care.    Discharge:  No

## 2017-04-06 ENCOUNTER — HOSPITAL ENCOUNTER (OUTPATIENT)
Dept: PHYSICAL THERAPY | Facility: CLINIC | Age: 67
Setting detail: THERAPIES SERIES
End: 2017-04-06
Attending: PSYCHIATRY & NEUROLOGY
Payer: MEDICARE

## 2017-04-06 PROCEDURE — 97110 THERAPEUTIC EXERCISES: CPT | Mod: GP | Performed by: PHYSICAL THERAPIST

## 2017-04-06 PROCEDURE — G8979 MOBILITY GOAL STATUS: HCPCS | Mod: GP,CJ | Performed by: PHYSICAL THERAPIST

## 2017-04-06 PROCEDURE — 40000719 ZZHC STATISTIC PT DEPARTMENT NEURO VISIT: Performed by: PHYSICAL THERAPIST

## 2017-04-06 PROCEDURE — G8980 MOBILITY D/C STATUS: HCPCS | Mod: GP,CJ | Performed by: PHYSICAL THERAPIST

## 2017-04-06 NOTE — PROGRESS NOTES
Outpatient Physical Therapy Discharge Note     Patient: Steven Dye  : 1950    Beginning/End Dates of Reporting Period:  17 to 2017    Referring Provider: Dr. Naima Fuller    Therapy Diagnosis: Difficultly with gait     Client Self Report: I feel good.     Objective Measurements:    Outcome Measures (most recent score):  AM-PAC  Basic Mobility Score Level  (Lower scores equate to lower levels of function): 70.78  AM-PAC  Daily Activity Score Level  (Lower scores equate to lower levels of function): 49.96  AM-PAC  Applied Cognitive Score Level  (Lower scores equate to lower levels of function): 56.94      Goals:  Goal Identifier BED MOBILITY   Goal Description 1. Patient will decrease amount of time allowed to ext bed, supine>sit, in less than 5seconds in order to improve efficiency and independence at home.   Target Date 17   Date Met  17   Progress:     Goal Identifier ARM SWING   Goal Description 2. Patient will demonstrate equal arm swing during gait in order to resume a normal walking pattern   Target Date 17   Date Met  17   Progress:     Goal Identifier HEP   Goal Description 3. Patient will be independent in a home program including exercises specific to Parkinson's to ease stiffness and improve flexibility, reaction time and general strength   Target Date 17   Date Met  17   Progress:     ALL GOALS MET      Progress Toward Goals: Tim has been seen in PT for 11 sessions and he's completed the PT portion of the LVST BIG program with excellent success. He's self calibrating the amplitude of his movements during gait, specifically arm swing, bed mobility and supine<>stand floor transfers. He's also showing improved awareness in the loudness of his voice. All goals have been met and he improved by 200 feet on the 6MWT. He plans to continue with his exercises daily as prescribed.       Progress this reporting period: all goals met    Plan:  Discharge  from therapy.    Discharge:    Reason for Discharge: Patient has met all goals.    Equipment Issued: HEP    Discharge Plan: Patient to continue home program.

## 2017-04-10 ENCOUNTER — HOSPITAL ENCOUNTER (OUTPATIENT)
Dept: SPEECH THERAPY | Facility: CLINIC | Age: 67
Setting detail: THERAPIES SERIES
End: 2017-04-10
Attending: PSYCHIATRY & NEUROLOGY
Payer: MEDICARE

## 2017-04-10 PROCEDURE — 40000211 ZZHC STATISTIC SLP  DEPARTMENT VISIT: Performed by: SPEECH-LANGUAGE PATHOLOGIST

## 2017-04-10 PROCEDURE — 92507 TX SP LANG VOICE COMM INDIV: CPT | Mod: GN | Performed by: SPEECH-LANGUAGE PATHOLOGIST

## 2017-04-11 ENCOUNTER — HOSPITAL ENCOUNTER (OUTPATIENT)
Dept: OCCUPATIONAL THERAPY | Facility: CLINIC | Age: 67
Setting detail: THERAPIES SERIES
End: 2017-04-11
Attending: PSYCHIATRY & NEUROLOGY
Payer: MEDICARE

## 2017-04-11 PROCEDURE — 40000125 ZZHC STATISTIC OT OUTPT VISIT: Performed by: OCCUPATIONAL THERAPIST

## 2017-04-11 PROCEDURE — 97530 THERAPEUTIC ACTIVITIES: CPT | Mod: GO | Performed by: OCCUPATIONAL THERAPIST

## 2017-04-11 PROCEDURE — G8989 SELF CARE D/C STATUS: HCPCS | Mod: GO,CI | Performed by: OCCUPATIONAL THERAPIST

## 2017-04-11 PROCEDURE — 97110 THERAPEUTIC EXERCISES: CPT | Mod: GO | Performed by: OCCUPATIONAL THERAPIST

## 2017-04-11 PROCEDURE — G8988 SELF CARE GOAL STATUS: HCPCS | Mod: GO,CI | Performed by: OCCUPATIONAL THERAPIST

## 2017-04-11 NOTE — PROGRESS NOTES
"Outpatient Occupational Therapy Discharge Note     Patient: Marlo Baker  : 1950  Insurance:   Payor/Plan Subscriber Name Rel Member # Group #   MEDICARE - [place]* MARLO BAKER*  [id number]       ATTN CLAIMS, [address]   BCBS - [employer] [place] * MARLO BAKER*  [id number]      [address] [address]       Beginning/End Dates of Reporting Period:  3/14/2017 to 2017    Referring Provider: Dr. Naima Fuller    Therapy Diagnosis: Parkinson's Disease    Client Self Report: \"Overall, I am moving a lot faster.  I have more stamina and I am more efficient with getting jackets on and getting out of bed. I am more consious of how I am moving.\"     Objective Measurements:     Objective Measure: 9HPT   Details: R: 26 seconds (increased from 33 seconds on eval);  L:  24 seconds (no change)             Outcome Measures (most recent score):  AM-PAC  Basic Mobility Score Level  (Lower scores equate to lower levels of function): 70.78  AM-PAC  Daily Activity Score Level  (Lower scores equate to lower levels of function): 49.96  AM-PAC  Applied Cognitive Score Level  (Lower scores equate to lower levels of function): 56.94      Goals:     Goal Identifier UE dressing   Goal Description Patient will use increased (normal) amplitude UE movements and speed so that he can don his coat independently in less than 20 seconds (without LOB, without help of spouse, to get to work, etc)   Target Date 17   Date Met   17   Progress:    Goal met.  Patient can don/doff his coat in 10 seconds using big movements which is half the time he completed on eval.       Goal Identifier UE Movement   Goal Description Patient will demonstrate faster speed with using UE's to increase ability to reach into cupboard, reach into the fridge,  things from the floor, etc. safely, independently and in a timely manner.   Target Date 17   Date Met   17   Progress:  Goal met.     Goal Identifier [facility]   Goal " Description Patient will independently use strategies (BIG movements and hand flicks) to increase coordination to assist with buttoning, dialing a telephone, and FM ADL's by completing the 9HPT in 25 seconds (R hand.)   Target Date 04/13/17   Date Met   4/11/17   Progress:  Goal met.         Progress Toward Goals:   Progress this reporting period: Patient was seen for OT eval and 6 treatment sessions.  He has completed the full LSVT-BIG program in conjunction with PT (4x/week x 4 weeks) and has met all of his goals.  Patient has made gains with FMC, arm swing, awareness of posture and mobility, and endurance.  He is now I with all of the LSVT maximal daily exercises.  He continues to have challenges with dual tasks and maintaining big with multitasking.    Plan:  Discharge from therapy.    Discharge:    Reason for Discharge: Patient has met all goals.    Equipment Issued: None    Discharge Plan: Patient to continue home program.

## 2017-04-12 ENCOUNTER — HOSPITAL ENCOUNTER (OUTPATIENT)
Dept: SPEECH THERAPY | Facility: CLINIC | Age: 67
Setting detail: THERAPIES SERIES
End: 2017-04-12
Attending: PSYCHIATRY & NEUROLOGY
Payer: MEDICARE

## 2017-04-12 PROCEDURE — 40000211 ZZHC STATISTIC SLP  DEPARTMENT VISIT: Performed by: SPEECH-LANGUAGE PATHOLOGIST

## 2017-04-12 PROCEDURE — 92507 TX SP LANG VOICE COMM INDIV: CPT | Mod: GN | Performed by: SPEECH-LANGUAGE PATHOLOGIST

## 2017-04-14 ENCOUNTER — HOSPITAL ENCOUNTER (OUTPATIENT)
Dept: SPEECH THERAPY | Facility: CLINIC | Age: 67
Setting detail: THERAPIES SERIES
End: 2017-04-14
Attending: PSYCHIATRY & NEUROLOGY
Payer: MEDICARE

## 2017-04-14 PROCEDURE — 40000211 ZZHC STATISTIC SLP  DEPARTMENT VISIT: Performed by: SPEECH-LANGUAGE PATHOLOGIST

## 2017-04-14 PROCEDURE — 92507 TX SP LANG VOICE COMM INDIV: CPT | Mod: GN | Performed by: SPEECH-LANGUAGE PATHOLOGIST

## 2017-04-17 ENCOUNTER — HOSPITAL ENCOUNTER (OUTPATIENT)
Dept: SPEECH THERAPY | Facility: CLINIC | Age: 67
Setting detail: THERAPIES SERIES
End: 2017-04-17
Attending: PSYCHIATRY & NEUROLOGY
Payer: MEDICARE

## 2017-04-17 PROCEDURE — 40000211 ZZHC STATISTIC SLP  DEPARTMENT VISIT: Performed by: SPEECH-LANGUAGE PATHOLOGIST

## 2017-04-17 PROCEDURE — 92507 TX SP LANG VOICE COMM INDIV: CPT | Mod: GN,KX | Performed by: SPEECH-LANGUAGE PATHOLOGIST

## 2017-04-19 ENCOUNTER — HOSPITAL ENCOUNTER (OUTPATIENT)
Dept: SPEECH THERAPY | Facility: CLINIC | Age: 67
Setting detail: THERAPIES SERIES
End: 2017-04-19
Attending: PSYCHIATRY & NEUROLOGY
Payer: MEDICARE

## 2017-04-19 PROCEDURE — 40000211 ZZHC STATISTIC SLP  DEPARTMENT VISIT: Performed by: SPEECH-LANGUAGE PATHOLOGIST

## 2017-04-19 PROCEDURE — 92507 TX SP LANG VOICE COMM INDIV: CPT | Mod: GN,KX | Performed by: SPEECH-LANGUAGE PATHOLOGIST

## 2017-04-21 ENCOUNTER — HOSPITAL ENCOUNTER (OUTPATIENT)
Dept: SPEECH THERAPY | Facility: CLINIC | Age: 67
Setting detail: THERAPIES SERIES
End: 2017-04-21
Attending: PSYCHIATRY & NEUROLOGY
Payer: MEDICARE

## 2017-04-21 PROCEDURE — 92507 TX SP LANG VOICE COMM INDIV: CPT | Mod: GN | Performed by: SPEECH-LANGUAGE PATHOLOGIST

## 2017-04-21 PROCEDURE — 40000211 ZZHC STATISTIC SLP  DEPARTMENT VISIT: Performed by: SPEECH-LANGUAGE PATHOLOGIST

## 2017-04-24 ENCOUNTER — HOSPITAL ENCOUNTER (OUTPATIENT)
Dept: SPEECH THERAPY | Facility: CLINIC | Age: 67
Setting detail: THERAPIES SERIES
End: 2017-04-24
Attending: PSYCHIATRY & NEUROLOGY
Payer: MEDICARE

## 2017-04-24 PROCEDURE — 40000211 ZZHC STATISTIC SLP  DEPARTMENT VISIT: Performed by: SPEECH-LANGUAGE PATHOLOGIST

## 2017-04-24 PROCEDURE — 92507 TX SP LANG VOICE COMM INDIV: CPT | Mod: GN,KX | Performed by: SPEECH-LANGUAGE PATHOLOGIST

## 2017-04-26 ENCOUNTER — HOSPITAL ENCOUNTER (OUTPATIENT)
Dept: SPEECH THERAPY | Facility: CLINIC | Age: 67
Setting detail: THERAPIES SERIES
End: 2017-04-26
Attending: PSYCHIATRY & NEUROLOGY
Payer: MEDICARE

## 2017-04-26 PROCEDURE — G9173 VOICE D/C STATUS: HCPCS | Mod: GN,CI | Performed by: SPEECH-LANGUAGE PATHOLOGIST

## 2017-04-26 PROCEDURE — 40000211 ZZHC STATISTIC SLP  DEPARTMENT VISIT: Performed by: SPEECH-LANGUAGE PATHOLOGIST

## 2017-04-26 PROCEDURE — G9172 VOICE GOAL STATUS: HCPCS | Mod: GN,CI | Performed by: SPEECH-LANGUAGE PATHOLOGIST

## 2017-04-26 PROCEDURE — 92507 TX SP LANG VOICE COMM INDIV: CPT | Mod: GN,KX | Performed by: SPEECH-LANGUAGE PATHOLOGIST

## 2017-04-26 NOTE — PROGRESS NOTES
Outpatient Speech Language Pathology Discharge Note     Patient: Steven Dye  : 1950    Beginning/End Dates of Reporting Period:  3/27/17 to 2017, total of 13 sessions    Referring Provider: Dr. Sabrina Diaz    Therapy Diagnosis: Mild hypokinetic dysarthria    Client Self Report: Patient said that he feels his voice is stronger and he has more consistency with a louder vocal volume. He reports he feels treatment has been helpful to his voice.     Objective Measurements: See below:      Goals:  Goal Identifier Volume - structured   Goal Description Patient will maintain a vocal volume of >70dB SPL with min cues across a 10 minute structured speech task (e.g. reading aloud) to meet daily communication needs.    Target Date 17   Date Met  17   Progress: Goal met and dismissed. 73 dB across 15 min complex reading task.      Goal Identifier Volume - unstructured   Goal Description Patient will reach a target volume of >70 dB SPL on a spontaneous, unstructured speech task for up to 10 minutes (e.g. conversation) with min cues to meet daily expressive communication needs.    Target Date 17   Date Met  17   Progress: Goal met and dismissed. 71 dB across 15 min complex speech task.      Goal Identifier Pitch   Goal Description Patient will increase pitch change on targeted emotional statements in 90% of opportunities, as judged by SLP, to meet daily pragmatic communication needs.    Target Date 17   Date Met  17   Progress: Goal met and dismissed. Increased emotional affect and facial expression in >90% of opportunities, as judged by SLP during extended conversation task.      Goal Identifier Voice hygiene   Goal Description Patient will learn and demostrate use of 3 vocal hygiene strategies to improve voice quality for everyday speech tasks.    Target Date 17   Date Met  17   Progress: Goal met and dismissed.      Progress Toward Goals:    Progress this  "reporting period: Excellent. Mr. Dye has remained highly motivated across treatment to address his voice and communication goals. Pt has made excellent progress toward assigned goals utilizing the LSVT Loud protocol. Mr. Dye is reporting improved ability to communicate effectively with family at home and in community settings with familiar and unfamiliar listeners. Average loudness on spontaneous speaking tasks (conversation, off-the-cuff questions) at time of discharge was 71 dB SLP on a complex task with distractors. Patient also demonstrating improved affect in voice to help express emotion in conversation with no cues. Endurance for longer speaking tasks at an increased volume has improved to the point that patient could participate in 50 mins of conversation at final session with min report of vocal fatigue vs. start of treatment when patient complained of running out of breath or \"forcing words out\" on short speaking tasks.  Plan: Discharge from therapy.     Discharge: Yes.     Reason for Discharge: Patient has met all goals    Discharge Plan: Patient recommended to continue home treatment plan for maintenance of gains made during SLP intervention.      Thank you for your referral of this patient.  It was a pleasure getting to know Mr. Dye and working with him on his voice goals.     Ivelisse Saeed MA, CCC-SLP  Speech-Language Pathology  [facility]  Phone: [phone]  Pager: [phone]    "

## 2017-06-19 ENCOUNTER — ALLIED HEALTH/NURSE VISIT (OUTPATIENT)
Dept: CARDIOLOGY | Facility: CLINIC | Age: 67
End: 2017-06-19
Payer: COMMERCIAL

## 2017-06-19 DIAGNOSIS — Z95.0 CARDIAC PACEMAKER IN SITU: Primary | ICD-10-CM

## 2017-06-19 PROCEDURE — 93280 PM DEVICE PROGR EVAL DUAL: CPT | Performed by: INTERNAL MEDICINE

## 2017-06-19 NOTE — MR AVS SNAPSHOT
After Visit Summary   6/19/2017    Steven Dye    MRN: [id number]           Patient Information     Date Of Birth          1950        Visit Information        Provider Department      6/19/2017 9:00 AM CARMELA LIVINGSTON[facility]        Today's Diagnoses     Cardiac pacemaker in situ    -  1       Follow-ups after your visit        Additional Services     Follow-Up with Cardiac Advanced Practice Provider                 Your next 10 appointments already scheduled     Sep 21, 2017 11:45 AM CDT   Teletrace with CARMELA TECH2   [facility] ([facility])    [address] [place] [address] [address]  [facility] [id number] [phone]            Sep 21, 2017 12:30 PM CDT   [facility] EP RETURN with Janki Casarez PA-C   [facility] ([facility])    [address] [place] [address] [address]  [facility] [id number] [phone]              Future tests that were ordered for you today     Open Future Orders        Priority Expected Expires Ordered    Follow-Up with Cardiac Advanced Practice Provider Routine 9/19/2017 6/19/2018 6/19/2017            Who to contact     If you have questions or need follow up information about today's clinic visit or your schedule please contact [facility] directly at [phone].  Normal or non-critical lab and imaging results will be communicated to you by MyChart, letter or phone within 4 business days after the clinic has received the results. If you do not hear from us within 7 days, please contact the clinic through MyChart or phone. If you have a critical or abnormal lab result, we will notify you by phone as soon as possible.  Submit refill requests through [employer] or call your pharmacy and they will forward the refill request to us. Please allow 3 business days for your refill to be completed.           "Additional Information About Your Visit        MyChart Information     [employer] lets you send messages to your doctor, view your test results, renew your prescriptions, schedule appointments and more. To sign up, go to [email or web address] . Click on \"Log in\" on the left side of the screen, which will take you to the Welcome page. Then click on \"Sign up Now\" on the right side of the page.     You will be asked to enter the access code listed below, as well as some personal information. Please follow the directions to create your username and password.     Your access code is: [email or web address]  Expires: 2017  9:33 AM     Your access code will  in 90 days. If you need help or a new code, please call your [facility] clinic or [phone].        Care EveryWhere ID     This is your Care EveryWhere ID. This could be used by other organizations to access your [facility] medical records  [id number]         Blood Pressure from Last 3 Encounters:   16 95/65   10/30/15 112/70   14 110/66    Weight from Last 3 Encounters:   16 95 kg (209 lb 6.4 oz)   10/30/15 95.4 kg (210 lb 6.4 oz)   14 95.7 kg (211 lb)              We Performed the Following     PM DEVICE PROGRAMMING EVAL, DUAL LEAD PACER (26031)        Primary Care Provider    None Specified       No primary provider on file.        Thank you!     Thank you for choosing [facility] [place]  for your care. Our goal is always to provide you with excellent care. Hearing back from our patients is one way we can continue to improve our services. Please take a few minutes to complete the written survey that you may receive in the mail after your visit with us. Thank you!             Your Updated Medication List - Protect others around you: Learn how to safely use, store and throw away your medicines at www.disposemymeds.org.          This list is accurate as of: 17  9:33 AM.  Always use your most recent med " list.                   Brand Name Dispense Instructions for use    amLODIPine 5 MG tablet    NORVASC     Take 5 mg by mouth daily       carbidopa-levodopa  MG per tablet    SINEMET     Take 1 tablet by mouth 4 times daily       lisinopril 20 MG tablet    PRINIVIL/ZESTRIL     Take 20 mg by mouth       metoprolol 50 MG 24 hr tablet    TOPROL XL    30 tablet    Take 1 tablet (50 mg) by mouth daily

## 2017-06-19 NOTE — PROGRESS NOTES
[employer]ru[employer] Pacemaker Device Check  AP: 6 % : 100 %  Mode: DDDR  bpm        Underlying Rhythm: CHB with no junctional escape  Heart Rate: Heart rate stable with good variability. Patient denies any activity intolerance.  Sensing: WNL    Pacing Threshold: WNL   Impedance: WNL  Battery Status: Approximately >5 years longevity.  Atrial Arrhythmia: 0  Ventricular Arrhythmia: 0  Setting Change: 0    Care Plan: Follow up with Q 3 month office teletrace. DINA Angulo RN

## 2017-09-21 ENCOUNTER — OFFICE VISIT (OUTPATIENT)
Dept: CARDIOLOGY | Facility: CLINIC | Age: 67
End: 2017-09-21
Attending: INTERNAL MEDICINE
Payer: COMMERCIAL

## 2017-09-21 ENCOUNTER — ALLIED HEALTH/NURSE VISIT (OUTPATIENT)
Dept: CARDIOLOGY | Facility: CLINIC | Age: 67
End: 2017-09-21
Payer: COMMERCIAL

## 2017-09-21 VITALS
SYSTOLIC BLOOD PRESSURE: 112 MMHG | HEART RATE: 62 BPM | HEIGHT: 73 IN | BODY MASS INDEX: 27.5 KG/M2 | WEIGHT: 207.5 LBS | DIASTOLIC BLOOD PRESSURE: 74 MMHG

## 2017-09-21 DIAGNOSIS — I10 ESSENTIAL HYPERTENSION WITH GOAL BLOOD PRESSURE LESS THAN 140/90: ICD-10-CM

## 2017-09-21 DIAGNOSIS — Z95.0 CARDIAC PACEMAKER IN SITU: Primary | ICD-10-CM

## 2017-09-21 DIAGNOSIS — I44.2 COMPLETE AV BLOCK (H): ICD-10-CM

## 2017-09-21 DIAGNOSIS — I45.10 RBBB: ICD-10-CM

## 2017-09-21 PROCEDURE — 93293 PM PHONE R-STRIP DEVICE EVAL: CPT | Performed by: INTERNAL MEDICINE

## 2017-09-21 PROCEDURE — 99213 OFFICE O/P EST LOW 20 MIN: CPT | Performed by: PHYSICIAN ASSISTANT

## 2017-09-21 RX ORDER — LISINOPRIL/HYDROCHLOROTHIAZIDE 10-12.5 MG
1 TABLET ORAL DAILY
Qty: 90 TABLET | Refills: 3 | Status: SHIPPED | OUTPATIENT
Start: 2017-09-21 | End: 2018-09-14

## 2017-09-21 RX ORDER — AMLODIPINE BESYLATE 5 MG/1
5 TABLET ORAL DAILY
Qty: 90 TABLET | Refills: 3 | Status: SHIPPED | OUTPATIENT
Start: 2017-09-21 | End: 2018-09-14

## 2017-09-21 RX ORDER — METOPROLOL SUCCINATE 50 MG/1
50 TABLET, EXTENDED RELEASE ORAL DAILY
Qty: 90 TABLET | Refills: 3 | Status: SHIPPED | OUTPATIENT
Start: 2017-09-21 | End: 2018-09-14

## 2017-09-21 RX ORDER — LISINOPRIL/HYDROCHLOROTHIAZIDE 10-12.5 MG
1 TABLET ORAL DAILY
COMMUNITY
End: 2017-09-21

## 2017-09-21 NOTE — MR AVS SNAPSHOT
"              After Visit Summary   9/21/2017    Steven Dye    MRN: [id number]           Patient Information     Date Of Birth          1950        Visit Information        Provider Department      9/21/2017 11:45 AM CASEY TECH2 [facility]        Today's Diagnoses     Cardiac pacemaker in situ    -  1       Follow-ups after your visit        Your next 10 appointments already scheduled     Sep 21, 2017 12:30 PM CDT   [facility] EP RETURN with Janki Casarez PA-C   [facility] ([facility])    [address] [place] [address] [address]  [facility] [id number] [phone]            Dec 22, 2017  8:15 AM CST   Pacemaker Check with CARMELA LIVINGSTONN   [facility] ([facility])    [address] [place] [address] [address]  [facility] [id number] [phone]              Who to contact     If you have questions or need follow up information about today's clinic visit or your schedule please contact [facility] directly at [phone].  Normal or non-critical lab and imaging results will be communicated to you by lancers Inchart, letter or phone within 4 business days after the clinic has received the results. If you do not hear from us within 7 days, please contact the clinic through [employer] or phone. If you have a critical or abnormal lab result, we will notify you by phone as soon as possible.  Submit refill requests through "[employer]" or call your pharmacy and they will forward the refill request to us. Please allow 3 business days for your refill to be completed.          Additional Information About Your Visit        lancers Inchart Information     "[employer]" lets you send messages to your doctor, view your test results, renew your prescriptions, schedule appointments and more. To sign up, go to [email or web address]" . Click on \"Log in\" on the left side of the screen, " "which will take you to the Welcome page. Then click on \"Sign up Now\" on the right side of the page.     You will be asked to enter the access code listed below, as well as some personal information. Please follow the directions to create your username and password.     Your access code is: [email or web address]  Expires: 2017 11:54 AM     Your access code will  in 90 days. If you need help or a new code, please call your [place] clinic or [phone].        Care EveryWhere ID     This is your Care EveryWhere ID. This could be used by other organizations to access your [place] medical records  [id number]         Blood Pressure from Last 3 Encounters:   16 95/65   10/30/15 112/70   14 110/66    Weight from Last 3 Encounters:   16 95 kg (209 lb 6.4 oz)   10/30/15 95.4 kg (210 lb 6.4 oz)   14 95.7 kg (211 lb)              We Performed the Following     PM PHONE R STRIP EVAL UP TO 90 DAYS ([id number])        Primary Care Provider    None Specified       No primary provider on file.        Equal Access to Services     [facility] AH: Hadii randolph castellanos Soniall, waaxda ludoriadaha, qaybta kaalmada adeaydenda, ana velazquez . So [facility] [phone].    ATENCIÓN: Si habla español, tiene a topete disposición servicios gratuitos de asistencia lingüística. Llame al [phone].    We comply with applicable federal civil rights laws and [place] laws. We do not discriminate on the basis of race, color, national origin, age, disability sex, sexual orientation or gender identity.            Thank you!     Thank you for choosing [facility] AT [place]  for your care. Our goal is always to provide you with excellent care. Hearing back from our patients is one way we can continue to improve our services. Please take a few minutes to complete the written survey that you may receive in the mail after your visit with us. Thank you!             Your Updated " Medication List - Protect others around you: Learn how to safely use, store and throw away your medicines at www.disposemymeds.org.          This list is accurate as of: 9/21/17 11:54 AM.  Always use your most recent med list.                   Brand Name Dispense Instructions for use Diagnosis    amLODIPine 5 MG tablet    NORVASC     Take 5 mg by mouth daily        carbidopa-levodopa  MG per tablet    SINEMET     Take 1 tablet by mouth 4 times daily        lisinopril 20 MG tablet    PRINIVIL/ZESTRIL     Take 20 mg by mouth        metoprolol 50 MG 24 hr tablet    TOPROL XL    30 tablet    Take 1 tablet (50 mg) by mouth daily    Essential hypertension with goal blood pressure less than 140/90

## 2017-09-21 NOTE — PROGRESS NOTES
HPI:   I met Steven today when he came for annual follow-up. He is a pleasant 66-year-old with a history of hypertension, dyslipidemia, Parkinson's disease and syncope in 2011 concerning for high degree AV block. He had a dual-chamber [employer] pacemaker placed 4/2011.    At his last appointment with Dr. Cervantes in 9/2016, metoprolol was decreased from 50 twice a day to 50 mg daily due to relative hypotension.    Annual pacemaker interrogation today showed 6% A Pacing and 100% V pacing with an underlying rhythm of complete heart block with no junctional escape. He had no atrial or ventricular arrhythmias noted.    Last echocardiogram done 4/2011 showed a normal ejection fraction of 60-65%. His atrial septum was aneurysmal.    He gets routine blood work done through [facility]. In 2/2017, BMP and CBC were normal, total cholesterol 165, triglycerides 159, HDL 33 and  mg/dL.      Assessment & Plan:    1. Syncope - Lou due to complete heart block, now status post pacemaker 4/2011. He has not had recurrent syncope, and is doing well. He will continue routine device checks through our office.    2. HTN - at his last appointment a year ago Dr. Cervantes decreased metoprolol succinate to 50 mg daily. Blood pressure today continues to look excellent. We will not make any changes and he will continue metoprolol, lisinopril and amlodipine. These have been refilled per his request as he is in between Primary Care Providers.      He will see Dr. Cervantes in 1 year with a repeat echocardiogram given his 100% RV pacing but call in the interim with any questions or concerns.      Oumou Casarez PA-C, MSPAS      Orders Placed This Encounter   Procedures     Follow-Up with Electrophysiologist     EKG 12-lead complete w/read - Clinics (to be scheduled)     Echocardiogram     Orders Placed This Encounter   Medications     DISCONTD: lisinopril-hydrochlorothiazide (PRINZIDE/ZESTORETIC) 10-12.5 MG per tablet     Sig: Take 1 tablet by  mouth daily     ROPINIROLE HCL PO     Sig: Take by mouth 3 times daily     metoprolol (TOPROL XL) 50 MG 24 hr tablet     Sig: Take 1 tablet (50 mg) by mouth daily     Dispense:  90 tablet     Refill:  3     lisinopril-hydrochlorothiazide (PRINZIDE/ZESTORETIC) 10-12.5 MG per tablet     Sig: Take 1 tablet by mouth daily     Dispense:  90 tablet     Refill:  3     amLODIPine (NORVASC) 5 MG tablet     Sig: Take 1 tablet (5 mg) by mouth daily     Dispense:  90 tablet     Refill:  3     Medications Discontinued During This Encounter   Medication Reason     lisinopril (PRINIVIL,ZESTRIL) 20 MG tablet Discontinued by another Health Care Provider     metoprolol (TOPROL XL) 50 MG 24 hr tablet Reorder     lisinopril-hydrochlorothiazide (PRINZIDE/ZESTORETIC) 10-12.5 MG per tablet Reorder     amLODIPine (NORVASC) 5 MG tablet Reorder         Encounter Diagnoses   Name Primary?     Cardiac pacemaker in situ Yes     RBBB      Complete AV block (H)      Essential hypertension with goal blood pressure less than 140/90        CURRENT MEDICATIONS:  Current Outpatient Prescriptions   Medication Sig Dispense Refill     ROPINIROLE HCL PO Take by mouth 3 times daily       metoprolol (TOPROL XL) 50 MG 24 hr tablet Take 1 tablet (50 mg) by mouth daily 90 tablet 3     lisinopril-hydrochlorothiazide (PRINZIDE/ZESTORETIC) 10-12.5 MG per tablet Take 1 tablet by mouth daily 90 tablet 3     amLODIPine (NORVASC) 5 MG tablet Take 1 tablet (5 mg) by mouth daily 90 tablet 3     carbidopa-levodopa (SINEMET)  MG per tablet Take 1 tablet by mouth 4 times daily       [DISCONTINUED] lisinopril-hydrochlorothiazide (PRINZIDE/ZESTORETIC) 10-12.5 MG per tablet Take 1 tablet by mouth daily       [DISCONTINUED] metoprolol (TOPROL XL) 50 MG 24 hr tablet Take 1 tablet (50 mg) by mouth daily 30 tablet 11     [DISCONTINUED] amLODIPine (NORVASC) 5 MG tablet Take 5 mg by mouth daily         ALLERGIES     Allergies   Allergen Reactions     Hmg-Coa-R Inhibitors   "    Penicillamine Rash       PAST MEDICAL HISTORY:  Past Medical History:   Diagnosis Date     Complete AV block (H)     [employer] dual chamber 4/1/2011     Hyperlipidemia      Hypertension      Parkinson disease (H)      Syncope        PAST SURGICAL HISTORY:  Past Surgical History:   Procedure Laterality Date     IMPLANT PACEMAKER         FAMILY HISTORY:  Family History   Problem Relation Age of Onset     C.A.D. Mother      C.A.D. Father        SOCIAL HISTORY:  Social History     Social History     Marital status:      Spouse name: N/A     Number of children: N/A     Years of education: N/A     Social History Main Topics     Smoking status: Never Smoker     Smokeless tobacco: Never Used     Alcohol use Yes     Drug use: None     Sexual activity: Not Asked     Other Topics Concern     Special Diet No     Exercise Yes     walking almost everyday     Social History Narrative       Review of Systems:  Skin:  Negative     Eyes:  Positive for contacts  ENT:  Negative    Respiratory:  Negative for dyspnea on exertion;shortness of breath  Cardiovascular:  Negative for;palpitations;chest pain;edema;lightheadedness;dizziness;syncope or near-syncope    Gastroenterology: Negative for melena;hematochezia  Genitourinary:  Negative    Musculoskeletal:  Negative    Neurologic:  Positive for    Psychiatric:  Negative    Heme/Lymph/Imm:  Negative    Endocrine:  Negative      Physical Exam:  Vitals: /74  Pulse 62  Ht 1.854 m (6' 0.99\")  Wt 94.1 kg (207 lb 8 oz)  BMI 27.38 kg/m2    Constitutional:  cooperative, alert and oriented, well developed, well nourished, in no acute distress        Skin:  warm and dry to the touch, no apparent skin lesions or masses noted        Head:  normocephalic, no masses or lesions        Eyes:  sclera white;no xanthalasma;conjunctivae and lids unremarkable;pupils equal and round        ENT:  no pallor or cyanosis, dentition good        Neck:  JVP normal;no carotid bruit    "     Chest:  normal breath sounds, clear to auscultation, normal A-P diameter, normal symmetry, normal respiratory excursion, no use of accessory muscles        Cardiac: regular rhythm, normal S1/S2, no S3 or S4, apical impulse not displaced, no murmurs, gallops or rubs                  Abdomen:  abdomen soft        Vascular: pulses full and equal                                      Extremities and Back:  no deformities, clubbing, cyanosis, erythema observed;no edema        Neurological:  affect appropriate, oriented to time, person and place tremor        Recent Lab Results:  LIPID RESULTS:  No results found for: CHOL, HDL, LDL, TRIG, CHOLHDLRATIO    LIVER ENZYME RESULTS:  Lab Results   Component Value Date    AST 26 04/01/2011    ALT 9 04/01/2011       CBC RESULTS:  Lab Results   Component Value Date    WBC 13.9 (H) 10/30/2015    RBC 5.36 10/30/2015    HGB 17.2 10/30/2015    HCT 47.6 10/30/2015    MCV 89 10/30/2015    MCH 32.1 10/30/2015    MCHC 36.1 10/30/2015    RDW 13.0 10/30/2015     10/30/2015       BMP RESULTS:  Lab Results   Component Value Date     10/30/2015    POTASSIUM 4.1 10/30/2015    CHLORIDE 104 10/30/2015    CO2 24 10/30/2015    ANIONGAP 9 10/30/2015     (H) 10/30/2015    BUN 30 10/30/2015    CR 1.57 (H) 10/30/2015    GFRESTIMATED 44 (L) 10/30/2015    GFRESTBLACK 54 (L) 10/30/2015    ANGIE 8.9 10/30/2015        A1C RESULTS:  No results found for: A1C    INR RESULTS:  Lab Results   Component Value Date    INR 0.99 04/01/2011

## 2017-09-21 NOTE — MR AVS SNAPSHOT
After Visit Summary   9/21/2017    Steven Dye    MRN: [id number]           Patient Information     Date Of Birth          1950        Visit Information        Provider Department      9/21/2017 12:30 PM Janki Casarez PA-C [facility]        Today's Diagnoses     Cardiac pacemaker in situ    -  1    RBBB        Complete AV block (H)        Essential hypertension with goal blood pressure less than 140/90          Care Instructions    1. Pacemaker check today looked great!    2. Sent new Rxs into pharmacy    3. See Dr. Cervantes in 1 year with an EKG and echocardiogram but CALL if need to be seen sooner! Our nurses are  @ [phone]          Follow-ups after your visit        Additional Services     Follow-Up with Electrophysiologist                 Your next 10 appointments already scheduled     Dec 22, 2017  8:15 AM CST   Pacemaker Check with CARMELA BIRD   [facility] ([facility])    [address] [place] [id number] [phone]              Future tests that were ordered for you today     Open Future Orders        Priority Expected Expires Ordered    EKG 12-lead complete w/read - Clinics (to be scheduled) Routine 9/21/2018 9/22/2018 9/21/2017    Echocardiogram Routine 9/21/2018 9/22/2018 9/21/2017    Follow-Up with Electrophysiologist Routine 9/21/2018 9/22/2018 9/21/2017            Who to contact     If you have questions or need follow up information about today's clinic visit or your schedule please contact [facility] directly at [phone].  Normal or non-critical lab and imaging results will be communicated to you by MyChart, letter or phone within 4 business days after the clinic has received the results. If you do not hear from us within 7 days, please contact the clinic through MyChart or phone. If you have a critical or  "abnormal lab result, we will notify you by phone as soon as possible.  Submit refill requests through [employer] or call your pharmacy and they will forward the refill request to us. Please allow 3 business days for your refill to be completed.          Additional Information About Your Visit        [employer] Information     [employer] lets you send messages to your doctor, view your test results, renew your prescriptions, schedule appointments and more. To sign up, go to [email or web address] . Click on \"Log in\" on the left side of the screen, which will take you to the Welcome page. Then click on \"Sign up Now\" on the right side of the page.     You will be asked to enter the access code listed below, as well as some personal information. Please follow the directions to create your username and password.     Your access code is: [email or web address]  Expires: 2017 11:54 AM     Your access code will  in 90 days. If you need help or a new code, please call your [place] clinic or [phone].        Care EveryWhere ID     This is your Care EveryWhere ID. This could be used by other organizations to access your [place] medical records  [id number]        Your Vitals Were     Pulse Height BMI (Body Mass Index)             62 1.854 m (6' 0.99\") 27.38 kg/m2          Blood Pressure from Last 3 Encounters:   17 112/74   16 95/65   10/30/15 112/70    Weight from Last 3 Encounters:   17 94.1 kg (207 lb 8 oz)   16 95 kg (209 lb 6.4 oz)   10/30/15 95.4 kg (210 lb 6.4 oz)              We Performed the Following     Follow-Up with Cardiac Advanced Practice Provider          Where to get your medicines      These medications were sent to [facility]/pharmacy #[id number] - [place], [place] - [address]  [address] [facility] [address] [address]     Phone:  [phone]     amLODIPine 5 MG tablet    lisinopril-hydrochlorothiazide 10-12.5 MG per tablet    metoprolol 50 MG 24 hr tablet          Primary Care Provider    None " Specified       No primary provider on file.        Equal Access to Services     RAVEN SCHULTZ : Hadii aad ku hadmadhavinavin Braxton, wascarlettda shane, qaromulotj eastmanmaana patel. So [facility] [phone].    ATENCIÓN: Si habla español, tiene a topete disposición servicios gratuitos de asistencia lingüística. Llame al [phone].    We comply with applicable federal civil rights laws and [place] laws. We do not discriminate on the basis of race, color, national origin, age, disability sex, sexual orientation or gender identity.            Thank you!     Thank you for choosing [facility] AT [place]  for your care. Our goal is always to provide you with excellent care. Hearing back from our patients is one way we can continue to improve our services. Please take a few minutes to complete the written survey that you may receive in the mail after your visit with us. Thank you!             Your Updated Medication List - Protect others around you: Learn how to safely use, store and throw away your medicines at www.disposemymeds.org.          This list is accurate as of: 9/21/17 12:44 PM.  Always use your most recent med list.                   Brand Name Dispense Instructions for use Diagnosis    amLODIPine 5 MG tablet    NORVASC    90 tablet    Take 1 tablet (5 mg) by mouth daily    Essential hypertension with goal blood pressure less than 140/90       carbidopa-levodopa  MG per tablet    SINEMET     Take 1 tablet by mouth 4 times daily        lisinopril-hydrochlorothiazide 10-12.5 MG per tablet    PRINZIDE/ZESTORETIC    90 tablet    Take 1 tablet by mouth daily    Essential hypertension with goal blood pressure less than 140/90       metoprolol 50 MG 24 hr tablet    TOPROL XL    90 tablet    Take 1 tablet (50 mg) by mouth daily    Essential hypertension with goal blood pressure less than 140/90       ROPINIROLE HCL PO      Take by mouth 3 times daily

## 2017-09-21 NOTE — PATIENT INSTRUCTIONS
1. Pacemaker check today looked great!    2. Sent new Rxs into pharmacy    3. See Dr. Cervantes in 1 year with an EKG and echocardiogram but CALL if need to be seen sooner! Our nurses are  @ [phone]

## 2017-09-21 NOTE — NURSING NOTE
~ 90 day office teletrace ~  Appropriate pacemaker function at time of check. Magnet response WNL. Six month threshold scheduled in Dec.

## 2017-12-22 ENCOUNTER — ALLIED HEALTH/NURSE VISIT (OUTPATIENT)
Dept: CARDIOLOGY | Facility: CLINIC | Age: 67
End: 2017-12-22
Payer: COMMERCIAL

## 2017-12-22 DIAGNOSIS — Z95.0 CARDIAC PACEMAKER IN SITU: Primary | ICD-10-CM

## 2017-12-22 PROCEDURE — 93280 PM DEVICE PROGR EVAL DUAL: CPT | Performed by: INTERNAL MEDICINE

## 2017-12-22 NOTE — PROGRESS NOTES
[employer] Pacemaker Device Check  AP: 6 % : 100 %  Mode: DDDR  bpm        Underlying Rhythm: SR with CHB with no junctional escape at VVI 30  Heart Rate: Stable with good variability.  Sensing: WNL    Pacing Threshold: WNL   Impedance: WNL  Battery Status: Approximately > 5 years longevity.  Device Site: Good, scar tissue noted.  Atrial Arrhythmia: 0  Ventricular Arrhythmia: 0  Setting Change: 0    Care Plan: Follow up with Q 3 month office teletraces. DINA Angulo RN

## 2017-12-22 NOTE — MR AVS SNAPSHOT
"              After Visit Summary   12/22/2017    Steven Dye    MRN: [id number]           Patient Information     Date Of Birth          1950        Visit Information        Provider Department      12/22/2017 8:15 AM CARMELA LIVINGSTONN [facility]        Today's Diagnoses     Cardiac pacemaker in situ    -  1       Follow-ups after your visit        Your next 10 appointments already scheduled     Mar 22, 2018  8:15 AM CDT   Teletrace with CASEY TECH2   [facility] ([facility])    [address] [place] [id number] [phone]              Who to contact     If you have questions or need follow up information about today's clinic visit or your schedule please contact [facility] directly at [phone].  Normal or non-critical lab and imaging results will be communicated to you by [employer], letter or phone within 4 business days after the clinic has received the results. If you do not hear from us within 7 days, please contact the clinic through [employer] or phone. If you have a critical or abnormal lab result, we will notify you by phone as soon as possible.  Submit refill requests through [employer] or call your pharmacy and they will forward the refill request to us. Please allow 3 business days for your refill to be completed.          Additional Information About Your Visit        MyChart Information     [employer] lets you send messages to your doctor, view your test results, renew your prescriptions, schedule appointments and more. To sign up, go to [email or web address] . Click on \"Log in\" on the left side of the screen, which will take you to the Welcome page. Then click on \"Sign up Now\" on the right side of the page.     You will be asked to enter the access code listed below, as well as some personal information. Please follow the directions to create your username " and password.     Your access code is: [email or web address]  Expires: 3/22/2018  8:38 AM     Your access code will  in 90 days. If you need help or a new code, please call your [facility] or [phone].        Care EveryWhere ID     This is your Care EveryWhere ID. This could be used by other organizations to access your [place] medical records  [id number]         Blood Pressure from Last 3 Encounters:   17 112/74   16 95/65   10/30/15 112/70    Weight from Last 3 Encounters:   17 94.1 kg (207 lb 8 oz)   16 95 kg (209 lb 6.4 oz)   10/30/15 95.4 kg (210 lb 6.4 oz)              We Performed the Following     PM DEVICE PROGRAMMING EVAL, DUAL LEAD PACER ([id number])        Primary Care Provider Fax #    Physician No Ref-Primary [phone]       No address on file        Equal Access to Services     RAVEN SCHULTZ : Hadii aad ku hadasho Soomaali, waaxda luqadaha, qaybta kaalmada adeegyada, waxay idiin hayewelinan alex velazquez . So [facility] [phone].    ATENCIÓN: Si habla español, tiene a topete disposición servicios gratuitos de asistencia lingüística. Llame al [phone].    We comply with applicable federal civil rights laws and [place] laws. We do not discriminate on the basis of race, color, national origin, age, disability, sex, sexual orientation, or gender identity.            Thank you!     Thank you for choosing [facility]  for your care. Our goal is always to provide you with excellent care. Hearing back from our patients is one way we can continue to improve our services. Please take a few minutes to complete the written survey that you may receive in the mail after your visit with us. Thank you!             Your Updated Medication List - Protect others around you: Learn how to safely use, store and throw away your medicines at www.disposemymeds.org.          This list is accurate as of: 17  8:38 AM.  Always use your most recent med  list.                   Brand Name Dispense Instructions for use Diagnosis    amLODIPine 5 MG tablet    NORVASC    90 tablet    Take 1 tablet (5 mg) by mouth daily    Essential hypertension with goal blood pressure less than 140/90       carbidopa-levodopa  MG per tablet    SINEMET     Take 1 tablet by mouth 4 times daily        lisinopril-hydrochlorothiazide 10-12.5 MG per tablet    PRINZIDE/ZESTORETIC    90 tablet    Take 1 tablet by mouth daily    Essential hypertension with goal blood pressure less than 140/90       metoprolol 50 MG 24 hr tablet    TOPROL XL    90 tablet    Take 1 tablet (50 mg) by mouth daily    Essential hypertension with goal blood pressure less than 140/90       ROPINIROLE HCL PO      Take by mouth 3 times daily

## 2018-03-15 ENCOUNTER — HOSPITAL ENCOUNTER (EMERGENCY)
Facility: CLINIC | Age: 68
Discharge: HOME | End: 2018-03-15
Attending: EMERGENCY MEDICINE | Admitting: EMERGENCY MEDICINE
Payer: MEDICARE

## 2018-03-15 VITALS
OXYGEN SATURATION: 100 % | HEART RATE: 60 BPM | DIASTOLIC BLOOD PRESSURE: 65 MMHG | TEMPERATURE: 97.6 F | HEIGHT: 73 IN | RESPIRATION RATE: 16 BRPM | SYSTOLIC BLOOD PRESSURE: 118 MMHG | BODY MASS INDEX: 28.49 KG/M2 | WEIGHT: 215 LBS

## 2018-03-15 DIAGNOSIS — N20.0 NEPHROLITHIASIS: ICD-10-CM

## 2018-03-15 LAB
ALBUMIN UR-MCNC: NEGATIVE MG/DL
ANION GAP SERPL CALCULATED.3IONS-SCNC: 6 MMOL/L (ref 3–14)
APPEARANCE UR: CLEAR
BASOPHILS # BLD AUTO: 0 10E9/L (ref 0–0.2)
BASOPHILS NFR BLD AUTO: 0.3 %
BILIRUB UR QL STRIP: NEGATIVE
BUN SERPL-MCNC: 26 MG/DL (ref 7–30)
CALCIUM SERPL-MCNC: 8.5 MG/DL (ref 8.5–10.1)
CHLORIDE SERPL-SCNC: 106 MMOL/L (ref 94–109)
CO2 SERPL-SCNC: 28 MMOL/L (ref 20–32)
COLOR UR AUTO: YELLOW
CREAT SERPL-MCNC: 1.39 MG/DL (ref 0.66–1.25)
DIFFERENTIAL METHOD BLD: ABNORMAL
EOSINOPHIL # BLD AUTO: 0.1 10E9/L (ref 0–0.7)
EOSINOPHIL NFR BLD AUTO: 1.3 %
ERYTHROCYTE [DISTWIDTH] IN BLOOD BY AUTOMATED COUNT: 12.8 % (ref 10–15)
GFR SERPL CREATININE-BSD FRML MDRD: 51 ML/MIN/1.7M2
GLUCOSE SERPL-MCNC: 134 MG/DL (ref 70–99)
GLUCOSE UR STRIP-MCNC: NEGATIVE MG/DL
HCT VFR BLD AUTO: 48.6 % (ref 40–53)
HGB BLD-MCNC: 16.4 G/DL (ref 13.3–17.7)
HGB UR QL STRIP: NEGATIVE
IMM GRANULOCYTES # BLD: 0 10E9/L (ref 0–0.4)
IMM GRANULOCYTES NFR BLD: 0.2 %
KETONES UR STRIP-MCNC: NEGATIVE MG/DL
LEUKOCYTE ESTERASE UR QL STRIP: NEGATIVE
LYMPHOCYTES # BLD AUTO: 1.5 10E9/L (ref 0.8–5.3)
LYMPHOCYTES NFR BLD AUTO: 16.7 %
MCH RBC QN AUTO: 30.9 PG (ref 26.5–33)
MCHC RBC AUTO-ENTMCNC: 33.7 G/DL (ref 31.5–36.5)
MCV RBC AUTO: 92 FL (ref 78–100)
MONOCYTES # BLD AUTO: 0.5 10E9/L (ref 0–1.3)
MONOCYTES NFR BLD AUTO: 6 %
MUCOUS THREADS #/AREA URNS LPF: PRESENT /LPF
NEUTROPHILS # BLD AUTO: 6.6 10E9/L (ref 1.6–8.3)
NEUTROPHILS NFR BLD AUTO: 75.5 %
NITRATE UR QL: NEGATIVE
NRBC # BLD AUTO: 0 10*3/UL
NRBC BLD AUTO-RTO: 0 /100
PH UR STRIP: 5 PH (ref 5–7)
PLATELET # BLD AUTO: 138 10E9/L (ref 150–450)
POTASSIUM SERPL-SCNC: 3.8 MMOL/L (ref 3.4–5.3)
RBC # BLD AUTO: 5.31 10E12/L (ref 4.4–5.9)
RBC #/AREA URNS AUTO: 1 /HPF (ref 0–2)
SODIUM SERPL-SCNC: 140 MMOL/L (ref 133–144)
SOURCE: ABNORMAL
SP GR UR STRIP: 1.01 (ref 1–1.03)
UROBILINOGEN UR STRIP-MCNC: NORMAL MG/DL (ref 0–2)
WBC # BLD AUTO: 8.8 10E9/L (ref 4–11)
WBC #/AREA URNS AUTO: <1 /HPF (ref 0–5)

## 2018-03-15 PROCEDURE — 81001 URINALYSIS AUTO W/SCOPE: CPT | Performed by: EMERGENCY MEDICINE

## 2018-03-15 PROCEDURE — 80048 BASIC METABOLIC PNL TOTAL CA: CPT | Performed by: EMERGENCY MEDICINE

## 2018-03-15 PROCEDURE — 96361 HYDRATE IV INFUSION ADD-ON: CPT

## 2018-03-15 PROCEDURE — 99285 EMERGENCY DEPT VISIT HI MDM: CPT | Mod: 25

## 2018-03-15 PROCEDURE — 25000128 H RX IP 250 OP 636: Performed by: EMERGENCY MEDICINE

## 2018-03-15 PROCEDURE — 85025 COMPLETE CBC W/AUTO DIFF WBC: CPT | Performed by: EMERGENCY MEDICINE

## 2018-03-15 PROCEDURE — 96374 THER/PROPH/DIAG INJ IV PUSH: CPT

## 2018-03-15 PROCEDURE — 25000132 ZZH RX MED GY IP 250 OP 250 PS 637: Mod: GY | Performed by: EMERGENCY MEDICINE

## 2018-03-15 PROCEDURE — 96375 TX/PRO/DX INJ NEW DRUG ADDON: CPT

## 2018-03-15 RX ORDER — HYDROCODONE BITARTRATE AND ACETAMINOPHEN 5; 325 MG/1; MG/1
1 TABLET ORAL EVERY 4 HOURS PRN
Qty: 15 TABLET | Refills: 0 | Status: SHIPPED | OUTPATIENT
Start: 2018-03-15 | End: 2020-10-20

## 2018-03-15 RX ORDER — TAMSULOSIN HYDROCHLORIDE 0.4 MG/1
0.4 CAPSULE ORAL ONCE
Status: COMPLETED | OUTPATIENT
Start: 2018-03-15 | End: 2018-03-15

## 2018-03-15 RX ORDER — HYDROMORPHONE HYDROCHLORIDE 1 MG/ML
0.5 INJECTION, SOLUTION INTRAMUSCULAR; INTRAVENOUS; SUBCUTANEOUS
Status: DISCONTINUED | OUTPATIENT
Start: 2018-03-15 | End: 2018-03-15 | Stop reason: HOSPADM

## 2018-03-15 RX ORDER — ONDANSETRON 2 MG/ML
4 INJECTION INTRAMUSCULAR; INTRAVENOUS ONCE
Status: COMPLETED | OUTPATIENT
Start: 2018-03-15 | End: 2018-03-15

## 2018-03-15 RX ORDER — ONDANSETRON 4 MG/1
4 TABLET, ORALLY DISINTEGRATING ORAL EVERY 6 HOURS PRN
Qty: 5 TABLET | Refills: 0 | Status: SHIPPED | OUTPATIENT
Start: 2018-03-15 | End: 2021-09-24

## 2018-03-15 RX ORDER — TAMSULOSIN HYDROCHLORIDE 0.4 MG/1
0.4 CAPSULE ORAL DAILY
Qty: 10 CAPSULE | Refills: 0 | Status: SHIPPED | OUTPATIENT
Start: 2018-03-15 | End: 2018-03-25

## 2018-03-15 RX ADMIN — ONDANSETRON 4 MG: 2 INJECTION INTRAMUSCULAR; INTRAVENOUS at 10:45

## 2018-03-15 RX ADMIN — HYDROMORPHONE HYDROCHLORIDE 0.5 MG: 1 INJECTION, SOLUTION INTRAMUSCULAR; INTRAVENOUS; SUBCUTANEOUS at 10:45

## 2018-03-15 RX ADMIN — TAMSULOSIN HYDROCHLORIDE 0.4 MG: 0.4 CAPSULE ORAL at 11:05

## 2018-03-15 RX ADMIN — SODIUM CHLORIDE 1000 ML: 9 INJECTION, SOLUTION INTRAVENOUS at 10:44

## 2018-03-15 ASSESSMENT — ENCOUNTER SYMPTOMS
HEMATURIA: 1
NAUSEA: 1
DYSURIA: 0
VOMITING: 0
ABDOMINAL PAIN: 1
FEVER: 0

## 2018-03-15 NOTE — DISCHARGE INSTRUCTIONS
"   * KIDNEY STONE (w/ Colic)    The sharp cramping pain and nausea/vomiting that you have is due to a small stone which has formed in the kidney and is now passing down a narrow tube (ureter) on its way to your bladder. Once it reaches your bladder, the pain will stop. The stone may pass in your urine stream in one piece. [The size may be 1/16\" to 1/4\" (1-6mm)]. Or, the stone may also break up into amarjit fragments which you may not even notice.  Once you have had a kidney stone there is a risk for recurrence in the future.  HOME CARE:    Drink lots of fluid (at least 8-10 glasses of water a day).    Most stones will pass on their own, but may take from a few hours to a few days.    Each time you urinate, do so in a jar. Pour the urine from the jar through the strainer and into the toilet. Continue doing this until 24 hours after your pain stops. By then, if there was a kidney stone, it should pass from your bladder. Some stones dissolve into sand-like particles and pass right through the strainer. In that case, you won't ever see a stone.    Save any stone that you find in the strainer and bring it to your doctor for analysis. It may be possible to prevent certain types of stones from forming. Therefore, it is important to know what kind of stone you have.    Try to stay as active as possible since this will help the stone pass. Do not stay in bed unless your pain prevents you from getting up. You may notice a red, pink or brown color to your urine. This is normal while passing a kidney stone.  FOLLOW UP with your doctor or return to this facility if the pain lasts more than 48 hours.  GET PROMPT MEDICAL ATTENTION if any of the following occur:    Pain that is not controlled by the medicine given    Repeated vomiting or unable to keep down fluids    Weakness, dizziness or fainting    Fever over 101  F (38.3  C)    Passage of solid red or brown urine (can't see through it) or urine with lots of blood clots    Unable " to pass urine for 8 hours and increasing bladder pressure    7835-0195 [employer] [address] [place] [address], [place] [address]. All rights reserved. This information is not intended as a substitute for professional medical care. Always follow your healthcare professional's instructions.  This information has been modified by your health care provider with permission from the publisher.  Opioid Medication Information    You have been given a prescription for an opioid (narcotic) pain medicine and/or have received a pain medicine while here in the Emergency Department. These medicines can make you drowsy or impaired. You must not drive, operate dangerous equipment, or engage in any other dangerous activities while taking these medications. If you drive while taking these medications, you could be arrested for DUI, or driving under the influence. Do not drink any alcohol while you are taking these medications.   Opioid pain medications can cause addiction. If you have a history of chemical dependency of any type, you are at a higher risk of becoming addicted to pain medications.  Only take these prescribed medications to treat your pain when all other options have been tried. Take it for as short a time and as few doses as possible. Store your pain pills in a secure place, as they are frequently stolen and provide a dangerous opportunity for children or visitors in your house to start abusing these powerful medications. We will not replace any lost or stolen medicine.  As soon as your pain is better, you should flush all your remaining medication.   Many prescription pain medications contain Tylenol  (acetaminophen), including Vicodin , Tylenol #3 , Norco , Lortab , and Percocet .  You should not take any extra pills of Tylenol  if you are using these prescription medications or you can get very sick.  Do not ever take more than 4000 mg of acetaminophen in any 24 hour period.  All opioids tend to cause  constipation. Drink plenty of water and eat foods that have a lot of fiber, such as fruits, vegetables, prune juice, apple juice and high fiber cereal.  Take a laxative if you don t move your bowels at least every other day. Miralax , Milk of Magnesia, Colace , or Senna  can be used to keep you regular.

## 2018-03-15 NOTE — ED PROVIDER NOTES
"  History     Chief Complaint:  Hematuria       HPI   Steven Dye is a 67 year old male with a history of kidney stones who presents with his wife to the Emergency Department for evaluation of hematuria. The patient reports that yesterday he began to have hematuria all day,(not present this morning).  Today he began to feel nauseous along with the development of left sided lower abdominal pain. He states that is abdominal pain is constant and is a dull throbbing pain that becomes sharp in intensity. His last kidney stone was 3 years prior, and feels similar to today's presentation of symptoms. He denies any fevers, vomiting, or painful urination.     Allergies:  Hmg-Coa-R Inhibitors  Penicillamine     Medications:    ROPINIROLE HCL PO  metoprolol (TOPROL XL) 50 MG 24 hr tablet  lisinopril-hydrochlorothiazide (PRINZIDE/ZESTORETIC) 10-12.5 MG per tablet  amLODIPine (NORVASC) 5 MG tablet  carbidopa-levodopa (SINEMET)  MG per tablet    Past Medical History:    Complete AV block (H)   Hyperlipidemia   Hypertension   Parkinson disease (H)   Syncope     Past Surgical History:    Implant pacemaker     Family History:    CAD     Social History:  Marital Status:     The patient was accompanied to the ED by his wife  Smoking Status: Never  Smokeless Tobacco: Never  Alcohol Use: Yes       Review of Systems   Constitutional: Negative for fever.   Gastrointestinal: Positive for abdominal pain and nausea. Negative for vomiting.   Genitourinary: Positive for hematuria. Negative for dysuria.   All other systems reviewed and are negative.      Physical Exam   First Vitals:  BP: 135/74  Pulse: 60  Temp: 97.6  F (36.4  C)  Resp: 16  Height: 185.4 cm (6' 1\")  Weight: 97.5 kg (215 lb)  SpO2: 100 %      Physical Exam  General/Appearance: appears stated age, well-groomed, appears comfortable  Eyes: EOMI, no scleral injection, no icterus  ENT: MMM  Neck: supple, nl ROM, no stiffness  Cardiovascular: RRR, nl S1S2, no " m/r/g, 2+ pulses in all 4 extremities, cap refill <2sec  Respiratory: CTAB, good air movement throughout, no wheezes/rhonchi/rales, no increased WOB, no retractions  Back: no lesions, no CVA ttp  GI: abd soft, non-distended, nttp,  no HSM, no rebound, no guarding, nl BS  MSK: MANZANARES, good tone, no bony abnormality  Skin: warm and well-perfused, no rash, no edema, no ecchymosis, nl turgor  Neuro: GCS 15, alert and oriented, no gross focal neuro deficits  Psych: interacts appropriately  Heme: no petechia, no purpura, no active bleeding      Emergency Department Course     Laboratory:  Laboratory findings were communicated with the patient who voiced understanding of the findings.  UA: Mucous Present o/w WNL   CBC:  (L) o/w WNL. (WBC 8.8, HGB 16.4)    CMP: Glucose 134 (H), GFR 51 (L), Creatinine 1.39 (H) o/w  WNL     Interventions:  1044:  0.9% Sodium Chloride BOLUS 1000 mL IV   1045: Zofran 4 mg IV   1045: Dilaudid 0.5 mg IV    1105: Flomax 0.4 mg P.O     Emergency Department Course:  IV was inserted and blood was drawn for laboratory testing, results above.    Nursing notes and vitals reviewed.  1015: I performed an exam of the patient as documented above.   1136: Patient rechecked and updated. Patient feels better    Findings and plan explained to the Patient and spouse. Patient discharged home with instructions regarding supportive care, medications, and reasons to return. The importance of close follow-up was reviewed. The patient was prescribed Norco, Zofran-ODT, Flomax    Impression & Plan      Medical Decision Making:  This patient is a 67-year-old male with a history of nephrolithiasis, who previously followed with urologist in [place], who presents with left flank pain and hematuria.  He states symptoms are similar to previous stones.  I was able to see a CT from within the past 3 years which showed no AAA, mass, other likely mimicking pathologies.  We discussed the need to get a CT today.  I offered one  however stated I also felt comfortable treating this empirically as a kidney stone.  Ultimately he opted to forego the CT.  Again I think this is reasonable as is worth giving it a trial for self resolution and, at that point, were it not to resolve a CT would be indicated.  There is no evidence of concomitant UTI.  He feels markedly improved after the medications given here.  He will be sent home on Flomax, pain meds, Zofran to be used as needed.    Diagnosis:    ICD-10-CM    1. Nephrolithiasis N20.0        Disposition:  discharged to home    Discharge Medications:  New Prescriptions    HYDROCODONE-ACETAMINOPHEN (NORCO) 5-325 MG PER TABLET    Take 1 tablet by mouth every 4 hours as needed for pain    ONDANSETRON (ZOFRAN-ODT) 4 MG ODT TAB    Take 1 tablet (4 mg) by mouth every 6 hours as needed for nausea    TAMSULOSIN (FLOMAX) 0.4 MG CAPSULE    Take 1 capsule (0.4 mg) by mouth daily for 10 doses         Denice Mart  3/15/2018    EMERGENCY DEPARTMENT      Scribe Disclosure:  I, Denice Mart, am serving as a scribe at 10:15 AM on 3/15/2018 to document services personally performed by Janki Duran MD based on my observations and the provider's statements to me.       Janki Duran MD  03/15/18 1461

## 2018-03-15 NOTE — ED AVS SNAPSHOT
Emergency Department    [address] [facility] [address]    Phone:  [phone]    Fax:  [phone]                                       Steven Dye   MRN: [id number]    Department:   Emergency Department   Date of Visit:  3/15/2018           After Visit Summary Signature Page     I have received my discharge instructions, and my questions have been answered. I have discussed any challenges I see with this plan with the nurse or doctor.    ..........................................................................................................................................  Patient/Patient Representative Signature      ..........................................................................................................................................  Patient Representative Print Name and Relationship to Patient    ..................................................               ................................................  Date                                            Time    ..........................................................................................................................................  Reviewed by Signature/Title    ...................................................              ..............................................  Date                                                            Time

## 2018-03-15 NOTE — ED AVS SNAPSHOT
"  Emergency Department    [address] [facility] [address]    Phone:  [phone]    Fax:  [phone]                                       Steven Dye   MRN: [id number]    Department:   Emergency Department   Date of Visit:  3/15/2018           Patient Information     Date Of Birth          1950        Your diagnoses for this visit were:     Nephrolithiasis        You were seen by Janki Duran MD.      Follow-up Information     Schedule an appointment as soon as possible for a visit with [facility].    Contact information:    [address]  [facility] [id number] [phone]          Follow up with  Emergency Department.    Specialty:  EMERGENCY MEDICINE    Why:  As needed, If symptoms worsen    Contact information:    [address] [facility] [address] [phone]        Discharge Instructions          * KIDNEY STONE (w/ Colic)    The sharp cramping pain and nausea/vomiting that you have is due to a small stone which has formed in the kidney and is now passing down a narrow tube (ureter) on its way to your bladder. Once it reaches your bladder, the pain will stop. The stone may pass in your urine stream in one piece. [The size may be 1/16\" to 1/4\" (1-6mm)]. Or, the stone may also break up into amarjit fragments which you may not even notice.  Once you have had a kidney stone there is a risk for recurrence in the future.  HOME CARE:    Drink lots of fluid (at least 8-10 glasses of water a day).    Most stones will pass on their own, but may take from a few hours to a few days.    Each time you urinate, do so in a jar. Pour the urine from the jar through the strainer and into the toilet. Continue doing this until 24 hours after your pain stops. By then, if there was a kidney stone, it should pass from your bladder. Some stones dissolve into sand-like particles and pass right through the strainer. In that case, you won't ever see " a stone.    Save any stone that you find in the strainer and bring it to your doctor for analysis. It may be possible to prevent certain types of stones from forming. Therefore, it is important to know what kind of stone you have.    Try to stay as active as possible since this will help the stone pass. Do not stay in bed unless your pain prevents you from getting up. You may notice a red, pink or brown color to your urine. This is normal while passing a kidney stone.  FOLLOW UP with your doctor or return to this facility if the pain lasts more than 48 hours.  GET PROMPT MEDICAL ATTENTION if any of the following occur:    Pain that is not controlled by the medicine given    Repeated vomiting or unable to keep down fluids    Weakness, dizziness or fainting    Fever over 101  F (38.3  C)    Passage of solid red or brown urine (can't see through it) or urine with lots of blood clots    Unable to pass urine for 8 hours and increasing bladder pressure    0690-4234 [employer]. [address] [place] [address]. All rights reserved. This information is not intended as a substitute for professional medical care. Always follow your healthcare professional's instructions.  This information has been modified by your health care provider with permission from the publisher.  Opioid Medication Information    You have been given a prescription for an opioid (narcotic) pain medicine and/or have received a pain medicine while here in the Emergency Department. These medicines can make you drowsy or impaired. You must not drive, operate dangerous equipment, or engage in any other dangerous activities while taking these medications. If you drive while taking these medications, you could be arrested for DUI, or driving under the influence. Do not drink any alcohol while you are taking these medications.   Opioid pain medications can cause addiction. If you have a history of chemical dependency of any type, you are  at a higher risk of becoming addicted to pain medications.  Only take these prescribed medications to treat your pain when all other options have been tried. Take it for as short a time and as few doses as possible. Store your pain pills in a secure place, as they are frequently stolen and provide a dangerous opportunity for children or visitors in your house to start abusing these powerful medications. We will not replace any lost or stolen medicine.  As soon as your pain is better, you should flush all your remaining medication.   Many prescription pain medications contain Tylenol  (acetaminophen), including Vicodin , Tylenol #3 , Norco , Lortab , and Percocet .  You should not take any extra pills of Tylenol  if you are using these prescription medications or you can get very sick.  Do not ever take more than 4000 mg of acetaminophen in any 24 hour period.  All opioids tend to cause constipation. Drink plenty of water and eat foods that have a lot of fiber, such as fruits, vegetables, prune juice, apple juice and high fiber cereal.  Take a laxative if you don t move your bowels at least every other day. Miralax , Milk of Magnesia, Colace , or Senna  can be used to keep you regular.            Future Appointments        Provider Department Dept Phone Center    3/22/2018 8:15 AM [facility] [phone] [facility] PSA CLIN      24 Hour Appointment Hotline       To make an appointment at any [facility], call [phone] ([phone]). If you don't have a family doctor or clinic, we will help you find one. [facility] are conveniently located to serve the needs of you and your family.             Review of your medicines      START taking        Dose / Directions Last dose taken    HYDROcodone-acetaminophen 5-325 MG per tablet   Commonly known as:  NORCO   Dose:  1 tablet   Quantity:  15 tablet        Take 1 tablet by mouth every 4 hours as needed for  pain   Refills:  0        ondansetron 4 MG ODT tab   Commonly known as:  ZOFRAN-ODT   Dose:  4 mg   Quantity:  5 tablet        Take 1 tablet (4 mg) by mouth every 6 hours as needed for nausea   Refills:  0        tamsulosin 0.4 MG capsule   Commonly known as:  FLOMAX   Dose:  0.4 mg   Quantity:  10 capsule        Take 1 capsule (0.4 mg) by mouth daily for 10 doses   Refills:  0          Our records show that you are taking the medicines listed below. If these are incorrect, please call your family doctor or clinic.        Dose / Directions Last dose taken    amLODIPine 5 MG tablet   Commonly known as:  NORVASC   Dose:  5 mg   Quantity:  90 tablet        Take 1 tablet (5 mg) by mouth daily   Refills:  3        carbidopa-levodopa  MG per tablet   Commonly known as:  SINEMET   Dose:  1 tablet        Take 1 tablet by mouth 4 times daily   Refills:  0        lisinopril-hydrochlorothiazide 10-12.5 MG per tablet   Commonly known as:  PRINZIDE/ZESTORETIC   Dose:  1 tablet   Quantity:  90 tablet        Take 1 tablet by mouth daily   Refills:  3        metoprolol succinate 50 MG 24 hr tablet   Commonly known as:  TOPROL XL   Dose:  50 mg   Quantity:  90 tablet        Take 1 tablet (50 mg) by mouth daily   Refills:  3        ROPINIROLE HCL PO        Take by mouth 3 times daily   Refills:  0                Prescriptions were sent or printed at these locations (3 Prescriptions)                   Other Prescriptions                Printed at Department/Unit printer (3 of 3)         HYDROcodone-acetaminophen (NORCO) 5-325 MG per tablet               tamsulosin (FLOMAX) 0.4 MG capsule               ondansetron (ZOFRAN-ODT) 4 MG ODT tab                Procedures and tests performed during your visit     Basic metabolic panel    CBC with platelets differential    UA with Microscopic      Orders Needing Specimen Collection     None      Pending Results     No orders found from 3/13/2018 to 3/16/2018.            Pending Culture  Results     No orders found from 3/13/2018 to 3/16/2018.            Pending Results Instructions     If you had any lab results that were not finalized at the time of your Discharge, you can call the ED Lab Result RN at [phone]. You will be contacted by this team for any positive Lab results or changes in treatment. The nurses are available 7 days a week from 10A to 6:30P.  You can leave a message 24 hours per day and they will return your call.        Test Results From Your Hospital Stay        3/15/2018 10:55 AM      Component Results     Component Value Ref Range & Units Status    WBC 8.8 4.0 - 11.0 10e9/L Final    RBC Count 5.31 4.4 - 5.9 10e12/L Final    Hemoglobin 16.4 13.3 - 17.7 g/dL Final    Hematocrit 48.6 40.0 - 53.0 % Final    MCV 92 78 - 100 fl Final    MCH 30.9 26.5 - 33.0 pg Final    MCHC 33.7 31.5 - 36.5 g/dL Final    RDW 12.8 10.0 - 15.0 % Final    Platelet Count 138 (L) 150 - 450 10e9/L Final    Diff Method Automated Method  Final    % Neutrophils 75.5 % Final    % Lymphocytes 16.7 % Final    % Monocytes 6.0 % Final    % Eosinophils 1.3 % Final    % Basophils 0.3 % Final    % Immature Granulocytes 0.2 % Final    Nucleated RBCs 0 0 /100 Final    Absolute Neutrophil 6.6 1.6 - 8.3 10e9/L Final    Absolute Lymphocytes 1.5 0.8 - 5.3 10e9/L Final    Absolute Monocytes 0.5 0.0 - 1.3 10e9/L Final    Absolute Eosinophils 0.1 0.0 - 0.7 10e9/L Final    Absolute Basophils 0.0 0.0 - 0.2 10e9/L Final    Abs Immature Granulocytes 0.0 0 - 0.4 10e9/L Final    Absolute Nucleated RBC 0.0  Final         3/15/2018 11:04 AM      Component Results     Component Value Ref Range & Units Status    Sodium 140 133 - 144 mmol/L Final    Potassium 3.8 3.4 - 5.3 mmol/L Final    Chloride 106 94 - 109 mmol/L Final    Carbon Dioxide 28 20 - 32 mmol/L Final    Anion Gap 6 3 - 14 mmol/L Final    Glucose 134 (H) 70 - 99 mg/dL Final    Urea Nitrogen 26 7 - 30 mg/dL Final    Creatinine 1.39 (H) 0.66 - 1.25 mg/dL Final    GFR  Estimate 51 (L) >60 mL/min/1.7m2 Final    Non  GFR Calc    GFR Estimate If Black 61 >60 mL/min/1.7m2 Final    African American GFR Calc    Calcium 8.5 8.5 - 10.1 mg/dL Final         3/15/2018 11:32 AM      Component Results     Component Value Ref Range & Units Status    Color Urine Yellow  Final    Appearance Urine Clear  Final    Glucose Urine Negative NEG^Negative mg/dL Final    Bilirubin Urine Negative NEG^Negative Final    Ketones Urine Negative NEG^Negative mg/dL Final    Specific Gravity Urine 1.008 1.003 - 1.035 Final    Blood Urine Negative NEG^Negative Final    pH Urine 5.0 5.0 - 7.0 pH Final    Protein Albumin Urine Negative NEG^Negative mg/dL Final    Urobilinogen mg/dL Normal 0.0 - 2.0 mg/dL Final    Nitrite Urine Negative NEG^Negative Final    Leukocyte Esterase Urine Negative NEG^Negative Final    Source Midstream Urine  Final    WBC Urine <1 0 - 5 /HPF Final    RBC Urine 1 0 - 2 /HPF Final    Mucous Urine Present (A) NEG^Negative /LPF Final                Clinical Quality Measure: Blood Pressure Screening     Your blood pressure was checked while you were in the emergency department today. The last reading we obtained was  BP: 135/74 . Please read the guidelines below about what these numbers mean and what you should do about them.  If your systolic blood pressure (the top number) is less than 120 and your diastolic blood pressure (the bottom number) is less than 80, then your blood pressure is normal. There is nothing more that you need to do about it.  If your systolic blood pressure (the top number) is 120-139 or your diastolic blood pressure (the bottom number) is 80-89, your blood pressure may be higher than it should be. You should have your blood pressure rechecked within a year by a primary care provider.  If your systolic blood pressure (the top number) is 140 or greater or your diastolic blood pressure (the bottom number) is 90 or greater, you may have high blood pressure.  "High blood pressure is treatable, but if left untreated over time it can put you at risk for heart attack, stroke, or kidney failure. You should have your blood pressure rechecked by a primary care provider within the next 4 weeks.  If your provider in the emergency department today gave you specific instructions to follow-up with your doctor or provider even sooner than that, you should follow that instruction and not wait for up to 4 weeks for your follow-up visit.        Thank you for choosing [facility]       Thank you for choosing [facility] for your care. Our goal is always to provide you with excellent care. Hearing back from our patients is one way we can continue to improve our services. Please take a few minutes to complete the written survey that you may receive in the mail after you visit with us. Thank you!        [employer] Information     [employer] lets you send messages to your doctor, view your test results, renew your prescriptions, schedule appointments and more. To sign up, go to [email or web address] . Click on \"Log in\" on the left side of the screen, which will take you to the Welcome page. Then click on \"Sign up Now\" on the right side of the page.     You will be asked to enter the access code listed below, as well as some personal information. Please follow the directions to create your username and password.     Your access code is: [email or web address]  Expires: 3/22/2018  9:38 AM     Your access code will  in 90 days. If you need help or a new code, please call your [facility] clinic or [phone].        Care EveryWhere ID     This is your Care EveryWhere ID. This could be used by other organizations to access your [facility] medical records  [id number]        Equal Access to Services     AISHWARYA SCHULTZ : Kacey Braxton, bala lynn, ana weaver. So [facility] [phone].    ATENCIÓN: Si habla español, tiene a topete disposición " servicios gratuitos de asistencia lingüística. Yuki tobin [phone].    We comply with applicable federal civil rights laws and [place] laws. We do not discriminate on the basis of race, color, national origin, age, disability, sex, sexual orientation, or gender identity.            After Visit Summary       This is your record. Keep this with you and show to your community pharmacist(s) and doctor(s) at your next visit.

## 2018-03-22 ENCOUNTER — ALLIED HEALTH/NURSE VISIT (OUTPATIENT)
Dept: CARDIOLOGY | Facility: CLINIC | Age: 68
End: 2018-03-22
Payer: COMMERCIAL

## 2018-03-22 DIAGNOSIS — Z95.0 CARDIAC PACEMAKER IN SITU: Primary | ICD-10-CM

## 2018-03-22 PROCEDURE — 93293 PM PHONE R-STRIP DEVICE EVAL: CPT | Performed by: INTERNAL MEDICINE

## 2018-03-22 NOTE — MR AVS SNAPSHOT
"              After Visit Summary   3/22/2018    Steven Dye    MRN: [id number]           Patient Information     Date Of Birth          1950        Visit Information        Provider Department      3/22/2018 8:15 AM [facility]        Today's Diagnoses     Cardiac pacemaker in situ    -  1       Follow-ups after your visit        Your next 10 appointments already scheduled     Jun 26, 2018  8:15 AM CDT   Pacemaker Check with CARMELA LIVINGSTONN   [facility] ([facility])    [address] [place], [place] [address]  [facility] [address] [phone] OPT 2              Who to contact     If you have questions or need follow up information about today's clinic visit or your schedule please contact [facility] directly at [phone].  Normal or non-critical lab and imaging results will be communicated to you by [employer], letter or phone within 4 business days after the clinic has received the results. If you do not hear from us within 7 days, please contact the clinic through [employer] or phone. If you have a critical or abnormal lab result, we will notify you by phone as soon as possible.  Submit refill requests through [employer] or call your pharmacy and they will forward the refill request to us. Please allow 3 business days for your refill to be completed.          Additional Information About Your Visit        MyChart Information     [employer] lets you send messages to your doctor, view your test results, renew your prescriptions, schedule appointments and more. To sign up, go to [email or web address] . Click on \"Log in\" on the left side of the screen, which will take you to the Welcome page. Then click on \"Sign up Now\" on the right side of the page.     You will be asked to enter the access code listed below, as well as some personal information. Please follow the directions to create your " username and password.     Your access code is: [email or web address]  Expires: 3/22/2018  9:38 AM     Your access code will  in 90 days. If you need help or a new code, please call your [facility] clinic or [phone].        Care EveryWhere ID     This is your Care EveryWhere ID. This could be used by other organizations to access your [facility] medical records  [id number]         Blood Pressure from Last 3 Encounters:   03/15/18 118/65   17 112/74   16 95/65    Weight from Last 3 Encounters:   03/15/18 97.5 kg (215 lb)   17 94.1 kg (207 lb 8 oz)   16 95 kg (209 lb 6.4 oz)              We Performed the Following     PM PHONE R STRIP EVAL UP TO 90 DAYS ([phone])        Primary Care Provider Fax #    Physician No Ref-Primary [phone]       No address on file        Equal Access to Services     [facility]A [facility] : Hadii randolph sorto hadasho Soomaali, waaxda luqadaha, qaybta kaalmada adeegyada, ana velazquez . So [facility] [phone].    ATENCIÓN: Si habla español, tiene a topete disposición servicios gratuitos de asistencia lingüística. Llame al [phone].    We comply with applicable federal civil rights laws and [place] laws. We do not discriminate on the basis of race, color, national origin, age, disability, sex, sexual orientation, or gender identity.            Thank you!     Thank you for choosing [facility] HE[facility]  for your care. Our goal is always to provide you with excellent care. Hearing back from our patients is one way we can continue to improve our services. Please take a few minutes to complete the written survey that you may receive in the mail after your visit with us. Thank you!             Your Updated Medication List - Protect others around you: Learn how to safely use, store and throw away your medicines at www.disposemymeds.org.          This list is accurate as of 3/22/18  8:25 AM.  Always use your most recent med list.                    Brand Name Dispense Instructions for use Diagnosis    amLODIPine 5 MG tablet    NORVASC    90 tablet    Take 1 tablet (5 mg) by mouth daily    Essential hypertension with goal blood pressure less than 140/90       carbidopa-levodopa  MG per tablet    SINEMET     Take 1 tablet by mouth 4 times daily        HYDROcodone-acetaminophen 5-325 MG per tablet    NORCO    15 tablet    Take 1 tablet by mouth every 4 hours as needed for pain        lisinopril-hydrochlorothiazide 10-12.5 MG per tablet    PRINZIDE/ZESTORETIC    90 tablet    Take 1 tablet by mouth daily    Essential hypertension with goal blood pressure less than 140/90       metoprolol succinate 50 MG 24 hr tablet    TOPROL XL    90 tablet    Take 1 tablet (50 mg) by mouth daily    Essential hypertension with goal blood pressure less than 140/90       ondansetron 4 MG ODT tab    ZOFRAN-ODT    5 tablet    Take 1 tablet (4 mg) by mouth every 6 hours as needed for nausea        ROPINIROLE HCL PO      Take by mouth 3 times daily        tamsulosin 0.4 MG capsule    FLOMAX    10 capsule    Take 1 capsule (0.4 mg) by mouth daily for 10 doses

## 2018-06-26 ENCOUNTER — ALLIED HEALTH/NURSE VISIT (OUTPATIENT)
Dept: CARDIOLOGY | Facility: CLINIC | Age: 68
End: 2018-06-26
Payer: COMMERCIAL

## 2018-06-26 DIAGNOSIS — I44.2 COMPLETE ATRIOVENTRICULAR BLOCK (H): ICD-10-CM

## 2018-06-26 DIAGNOSIS — Z95.0 CARDIAC PACEMAKER IN SITU: Primary | ICD-10-CM

## 2018-06-26 PROCEDURE — 93280 PM DEVICE PROGR EVAL DUAL: CPT | Performed by: INTERNAL MEDICINE

## 2018-06-26 NOTE — MR AVS SNAPSHOT
After Visit Summary   6/26/2018    Steven Dye    MRN: [id number]           Patient Information     Date Of Birth          1950        Visit Information        Provider Department      6/26/2018 8:15 AM CARMELA LIVINGSTONN [facility]        Today's Diagnoses     Cardiac pacemaker in situ    -  1    Complete atrioventricular block (H)           Follow-ups after your visit        Your next 10 appointments already scheduled     Sep 14, 2018 10:00 AM CDT   Ech Complete with [id number]   [facility] CV Echocardiography (Cardiovascular Imaging at [facility])    [address]  [address]  [place] [id number] [phone]           1. Please bring or wear a comfortable two-piece outfit. 2. You may eat, drink and take your normal medicines. 3. For any questions that cannot be answered, please contact the ordering physician            Sep 14, 2018 12:30 PM CDT   [facility] EP RETURN with Janki Casarez PA-C   [facility] ([facility])    [address]  [place] [id number] [phone] OPT 2            Oct 02, 2018  8:15 AM CDT   Teletrace with CASEY TECH1   [facility] ([facility])    [address]  [place] [id number] [phone] OPT 2              Who to contact     If you have questions or need follow up information about today's clinic visit or your schedule please contact [facility] directly at [phone].  Normal or non-critical lab and imaging results will be communicated to you by MyChart, letter or phone within 4 business days after the clinic has received the results. If you do not hear from us within 7 days, please contact the clinic through MyChart or phone. If you have a critical or abnormal lab result, we will notify you by phone as soon as  "possible.  Submit refill requests through [employer] or call your pharmacy and they will forward the refill request to us. Please allow 3 business days for your refill to be completed.          Additional Information About Your Visit        [employer] Information     [employer] lets you send messages to your doctor, view your test results, renew your prescriptions, schedule appointments and more. To sign up, go to [email or web address] . Click on \"Log in\" on the left side of the screen, which will take you to the Welcome page. Then click on \"Sign up Now\" on the right side of the page.     You will be asked to enter the access code listed below, as well as some personal information. Please follow the directions to create your username and password.     Your access code is: [email or web address]  Expires: 2018  8:09 AM     Your access code will  in 90 days. If you need help or a new code, please call your [place] clinic or [phone].        Care EveryWhere ID     This is your Care EveryWhere ID. This could be used by other organizations to access your [place] medical records  [id number]         Blood Pressure from Last 3 Encounters:   03/15/18 118/65   17 112/74   16 95/65    Weight from Last 3 Encounters:   03/15/18 97.5 kg (215 lb)   17 94.1 kg (207 lb 8 oz)   16 95 kg (209 lb 6.4 oz)              We Performed the Following     PM DEVICE PROGRAMMING EVAL, DUAL LEAD PACER ([id number])        Primary Care Provider Fax #    Physician No Ref-Primary [phone]       No address on file        Equal Access to Services     RAVEN SCHULTZ : Hadchristiano Braxton, bala lynn, qaana stallworth. So [facility] [phone].    ATENCIÓN: Si habla español, tiene a topete disposición servicios gratuitos de asistencia lingüística. Llame al [phone].    We comply with applicable federal civil rights laws and [place] laws. We do not discriminate on the " basis of race, color, national origin, age, disability, sex, sexual orientation, or gender identity.            Thank you!     Thank you for choosing [facility]  for your care. Our goal is always to provide you with excellent care. Hearing back from our patients is one way we can continue to improve our services. Please take a few minutes to complete the written survey that you may receive in the mail after your visit with us. Thank you!             Your Updated Medication List - Protect others around you: Learn how to safely use, store and throw away your medicines at www.disposemymeds.org.          This list is accurate as of 6/26/18  8:34 AM.  Always use your most recent med list.                   Brand Name Dispense Instructions for use Diagnosis    amLODIPine 5 MG tablet    NORVASC    90 tablet    Take 1 tablet (5 mg) by mouth daily    Essential hypertension with goal blood pressure less than 140/90       carbidopa-levodopa  MG per tablet    SINEMET     Take 1 tablet by mouth 4 times daily        HYDROcodone-acetaminophen 5-325 MG per tablet    NORCO    15 tablet    Take 1 tablet by mouth every 4 hours as needed for pain        lisinopril-hydrochlorothiazide 10-12.5 MG per tablet    PRINZIDE/ZESTORETIC    90 tablet    Take 1 tablet by mouth daily    Essential hypertension with goal blood pressure less than 140/90       metoprolol succinate 50 MG 24 hr tablet    TOPROL XL    90 tablet    Take 1 tablet (50 mg) by mouth daily    Essential hypertension with goal blood pressure less than 140/90       ondansetron 4 MG ODT tab    ZOFRAN-ODT    5 tablet    Take 1 tablet (4 mg) by mouth every 6 hours as needed for nausea        ROPINIROLE HCL PO      Take by mouth 3 times daily

## 2018-06-26 NOTE — PROGRESS NOTES
[employer] Pacemaker Device Check  AP: 8%  : 100 %  Mode: DDDR 60/130        Underlying Rhythm: CHB with no escape at VVI 30  Heart Rate: Stable with adequate variability and no c/o of exercise intolerance  Sensing: A= WNL RV not obtained (dependent)    Pacing Threshold: WNL   Impedance: WNL  Battery Status: Estimated at 4.5 years remaining longevity  Device Site: No discoloration minimal scarring  Atrial Arrhythmia: 0  Ventricular Arrhythmia: 0  Setting Change: None    Care Plan: Return to office teletrace in 3 months. SUJATHA Bernabe

## 2018-09-14 ENCOUNTER — OFFICE VISIT (OUTPATIENT)
Dept: CARDIOLOGY | Facility: CLINIC | Age: 68
End: 2018-09-14
Attending: PHYSICIAN ASSISTANT
Payer: COMMERCIAL

## 2018-09-14 ENCOUNTER — HOSPITAL ENCOUNTER (OUTPATIENT)
Dept: CARDIOLOGY | Facility: CLINIC | Age: 68
Discharge: HOME | End: 2018-09-14
Attending: PHYSICIAN ASSISTANT | Admitting: PHYSICIAN ASSISTANT
Payer: MEDICARE

## 2018-09-14 VITALS
BODY MASS INDEX: 26.43 KG/M2 | WEIGHT: 199.4 LBS | SYSTOLIC BLOOD PRESSURE: 90 MMHG | DIASTOLIC BLOOD PRESSURE: 60 MMHG | HEIGHT: 73 IN | HEART RATE: 72 BPM

## 2018-09-14 DIAGNOSIS — I10 ESSENTIAL HYPERTENSION WITH GOAL BLOOD PRESSURE LESS THAN 140/90: ICD-10-CM

## 2018-09-14 DIAGNOSIS — Z95.0 CARDIAC PACEMAKER IN SITU: ICD-10-CM

## 2018-09-14 DIAGNOSIS — I10 ESSENTIAL HYPERTENSION: ICD-10-CM

## 2018-09-14 DIAGNOSIS — I44.2 COMPLETE AV BLOCK (H): ICD-10-CM

## 2018-09-14 DIAGNOSIS — I44.2 COMPLETE AV BLOCK (H): Primary | ICD-10-CM

## 2018-09-14 PROCEDURE — 25500064 ZZH RX 255 OP 636: Performed by: PHYSICIAN ASSISTANT

## 2018-09-14 PROCEDURE — 93306 TTE W/DOPPLER COMPLETE: CPT | Mod: 26 | Performed by: INTERNAL MEDICINE

## 2018-09-14 PROCEDURE — 99214 OFFICE O/P EST MOD 30 MIN: CPT | Mod: 25 | Performed by: PHYSICIAN ASSISTANT

## 2018-09-14 PROCEDURE — 93000 ELECTROCARDIOGRAM COMPLETE: CPT | Performed by: PHYSICIAN ASSISTANT

## 2018-09-14 RX ORDER — LISINOPRIL/HYDROCHLOROTHIAZIDE 10-12.5 MG
1 TABLET ORAL DAILY
Qty: 90 TABLET | Refills: 3 | Status: SHIPPED | OUTPATIENT
Start: 2018-09-14 | End: 2019-09-24

## 2018-09-14 RX ORDER — METOPROLOL SUCCINATE 50 MG/1
50 TABLET, EXTENDED RELEASE ORAL DAILY
Qty: 90 TABLET | Refills: 3 | Status: SHIPPED | OUTPATIENT
Start: 2018-09-14 | End: 2019-09-24

## 2018-09-14 RX ORDER — AMLODIPINE BESYLATE 5 MG/1
5 TABLET ORAL DAILY
Qty: 90 TABLET | Refills: 3 | Status: SHIPPED | OUTPATIENT
Start: 2018-09-14 | End: 2019-09-24

## 2018-09-14 RX ADMIN — HUMAN ALBUMIN MICROSPHERES AND PERFLUTREN 4 ML: 10; .22 INJECTION, SOLUTION INTRAVENOUS at 10:30

## 2018-09-14 NOTE — PROGRESS NOTES
Service Date: 09/14/2018      HISTORY OF PRESENT ILLNESS:  I had the pleasure of seeing Steven today when he came for annual followup.  He is a very pleasant 67-year-old with history of hypertension, dyslipidemia, Parkinson's disease for which he sees Dr. Fuller, and an episode of syncope in 2011.  He was noted to have high-degree AV block and underwent placement of a dual-chamber [employer] pacemaker 04/2011.  This has worked quite well and in 09/2017 he was recommended for annual followup with an echocardiogram and EKG as neither of them had been done since his pacemaker was placed.      Steven tells me that for the last year he has done well, specifically denying any problems with lightheadedness, dizziness, chest pain, pressure, tightness, edema, orthopnea or PND.  He does think his Parkinson's is progressing.  He is off balance at times, but has not had any falls.  He had an ER visit in 03/2018 secondary to a kidney stone.      Echocardiogram was done today showing a preserved ejection fraction at 55%-60%.  He had normal right-sided function and size.  He had no significant valvular abnormalities.  He did have a blood pressure of 130/84 which was quite a bit higher than what we got in our clinic visit today.  Finally, we also note that he had an enlargement of his ascending aorta and aortic root. Back in 2011, his root was 3.6 cm and it is now 3.9 cm.  His ascending aorta had previously been 3.3 cm and is now up to 3.8 cm.      EKG today, which I overread, showed what looked to initially be sinus rhythm with a long first-degree AV block.  I actually had his device interrogated as I was unable to see pacer spikes and his last device interrogation in June had confirmed that he remained 100% V paced with underlying of complete heart block and no escape at VVI 30.  Anne, our device RN, was kind enough to come in and interrogate this for us.  He is indeed 100% V paced and the pacer spikes are just so small  that they are unable to be seen on the surface EKG.  Therefore, he is A sensed, V paced at 70 beats per minute with a long AV delay.      Device interrogation done  showed 8% A pacing, 100% V pacing and DDDR .  He has had no significant arrhythmias.        ASSESSMENT AND PLAN:     1.  History of heart block.  He has a [employer] dual-chamber pacemaker in.  He had this confirmed again today that it is working well and he is A sensed and V paced.  There have been no alerts and he will continue routine device interrogations through our office.      2.  Aortic abnormalities.  As above, he has been noted to have an enlarged root and ascending aorta.  These have progressed since  and we will get a repeat echocardiogram when we see him next year.      His blood pressure is pretty labile, likely given his Parkinson's disease, and I do not want to adjust any antihypertensives at this time.      3.  Hypertension.  He has a history of this but we note blood pressure occasionally gets low and in the past Dr. Cervantes has actually decreased his metoprolol therapy because of this.  I think down the road, if blood pressure becomes an issue would discontinue the hydrochlorothiazide portion of his lisinopril/HCTZ or could discontinue amlodipine.      He is to call if he gets any problems with lightheadedness or dizziness associated with hypotension and we can certainly start adjusting medications.      He will see Dr. Cervantes in a year with an EKG and echo but call if he has any issues in the interim.         YENY MCKENNA PA-C             D: 2018   T: 2018   MT: TANNER      Name:     MARLO BAKER   MRN:      [id number]        Account:      [id number]   :      1950           Service Date: 2018      Document: [id number]

## 2018-09-14 NOTE — PROGRESS NOTES
HPI and Plan:   See dictation #[id number]    Orders Placed This Encounter   Procedures     Follow-Up with Electrophysiologist     EKG 12-lead complete w/read - Clinics (performed today)     EKG 12-lead complete w/read - Clinics (to be scheduled)     Echocardiogram       Orders Placed This Encounter   Medications     aspirin 81 MG tablet     Sig: Take 81 mg by mouth daily     metoprolol succinate (TOPROL XL) 50 MG 24 hr tablet     Sig: Take 1 tablet (50 mg) by mouth daily     Dispense:  90 tablet     Refill:  3     lisinopril-hydrochlorothiazide (PRINZIDE/ZESTORETIC) 10-12.5 MG per tablet     Sig: Take 1 tablet by mouth daily     Dispense:  90 tablet     Refill:  3     amLODIPine (NORVASC) 5 MG tablet     Sig: Take 1 tablet (5 mg) by mouth daily     Dispense:  90 tablet     Refill:  3       Medications Discontinued During This Encounter   Medication Reason     metoprolol (TOPROL XL) 50 MG 24 hr tablet Reorder     lisinopril-hydrochlorothiazide (PRINZIDE/ZESTORETIC) 10-12.5 MG per tablet Reorder     amLODIPine (NORVASC) 5 MG tablet Reorder         Encounter Diagnoses   Name Primary?     Cardiac pacemaker in situ      Complete AV block (H) Yes     Essential hypertension      Essential hypertension with goal blood pressure less than 140/90        CURRENT MEDICATIONS:  Current Outpatient Prescriptions   Medication Sig Dispense Refill     amLODIPine (NORVASC) 5 MG tablet Take 1 tablet (5 mg) by mouth daily 90 tablet 3     aspirin 81 MG tablet Take 81 mg by mouth daily       carbidopa-levodopa (SINEMET)  MG per tablet Take 1 tablet by mouth 4 times daily       lisinopril-hydrochlorothiazide (PRINZIDE/ZESTORETIC) 10-12.5 MG per tablet Take 1 tablet by mouth daily 90 tablet 3     metoprolol succinate (TOPROL XL) 50 MG 24 hr tablet Take 1 tablet (50 mg) by mouth daily 90 tablet 3     ROPINIROLE HCL PO Take by mouth 3 times daily       HYDROcodone-acetaminophen (NORCO) 5-325 MG per tablet Take 1 tablet by mouth every 4 hours  as needed for pain (Patient not taking: Reported on 9/14/2018) 15 tablet 0     ondansetron (ZOFRAN-ODT) 4 MG ODT tab Take 1 tablet (4 mg) by mouth every 6 hours as needed for nausea (Patient not taking: Reported on 9/14/2018) 5 tablet 0     [DISCONTINUED] amLODIPine (NORVASC) 5 MG tablet Take 1 tablet (5 mg) by mouth daily 90 tablet 3     [DISCONTINUED] lisinopril-hydrochlorothiazide (PRINZIDE/ZESTORETIC) 10-12.5 MG per tablet Take 1 tablet by mouth daily 90 tablet 3     [DISCONTINUED] metoprolol (TOPROL XL) 50 MG 24 hr tablet Take 1 tablet (50 mg) by mouth daily 90 tablet 3       ALLERGIES     Allergies   Allergen Reactions     Hmg-Coa-R Inhibitors      Penicillamine Rash       PAST MEDICAL HISTORY:  Past Medical History:   Diagnosis Date     Complete AV block (H)     [employer] dual chamber 4/1/2011     Hyperlipidemia      Hypertension      Parkinson disease (H)      Syncope        PAST SURGICAL HISTORY:  Past Surgical History:   Procedure Laterality Date     IMPLANT PACEMAKER         FAMILY HISTORY:  Family History   Problem Relation Age of Onset     C.A.D. Mother      C.A.D. Father        SOCIAL HISTORY:  Social History     Social History     Marital status:      Spouse name: N/A     Number of children: N/A     Years of education: N/A     Social History Main Topics     Smoking status: Never Smoker     Smokeless tobacco: Never Used     Alcohol use Yes      Comment: glass of wine occ     Drug use: None     Sexual activity: Not Asked     Other Topics Concern     Special Diet No     Exercise Yes     walking almost everyday     Social History Narrative       Review of Systems:  Skin:  Negative       Eyes:  Positive for contacts    ENT:  Negative      Respiratory:  Negative for dyspnea on exertion;shortness of breath     Cardiovascular:  Negative for;palpitations;chest pain;edema;lightheadedness;dizziness;syncope or near-syncope      Gastroenterology: Negative for melena;hematochezia    Genitourinary:   "Negative      Musculoskeletal:  Negative      Neurologic:  Positive for   Parkinsons  Psychiatric:  Negative      Heme/Lymph/Imm:  Negative      Endocrine:  Negative        Physical Exam:  Vitals: BP 90/60  Pulse 72  Ht 1.854 m (6' 0.99\")  Wt 90.4 kg (199 lb 6.4 oz)  BMI 26.31 kg/m2    Constitutional:  cooperative, alert and oriented, well developed, well nourished, in no acute distress        Skin:  warm and dry to the touch, no apparent skin lesions or masses noted   pacemaker incision in the left infraclavicular area was well-healed      Head:  normocephalic, no masses or lesions        Eyes:  sclera white;no xanthalasma;conjunctivae and lids unremarkable;pupils equal and round        Lymph:      ENT:  no pallor or cyanosis, dentition good        Neck:  JVP normal;no carotid bruit        Respiratory:  normal breath sounds, clear to auscultation, normal A-P diameter, normal symmetry, normal respiratory excursion, no use of accessory muscles         Cardiac: regular rhythm, normal S1/S2, no S3 or S4, apical impulse not displaced, no murmurs, gallops or rubs                pulses full and equal                                        GI:  abdomen soft        Extremities and Muscular Skeletal:  no deformities, clubbing, cyanosis, erythema observed;no edema              Neurological:    tremor      Psych:  Alert and Oriented x 3            "

## 2018-09-14 NOTE — LETTER
9/14/2018    Physician No Ref-Primary  No address on file    RE: Steven Dye       Dear Colleague,    I had the pleasure of seeing Steven Dye in the [facility].    HPI and Plan:   See dictation #[id number]    Orders Placed This Encounter   Procedures     Follow-Up with Electrophysiologist     EKG 12-lead complete w/read - Clinics (performed today)     EKG 12-lead complete w/read - Clinics (to be scheduled)     Echocardiogram       Orders Placed This Encounter   Medications     aspirin 81 MG tablet     Sig: Take 81 mg by mouth daily     metoprolol succinate (TOPROL XL) 50 MG 24 hr tablet     Sig: Take 1 tablet (50 mg) by mouth daily     Dispense:  90 tablet     Refill:  3     lisinopril-hydrochlorothiazide (PRINZIDE/ZESTORETIC) 10-12.5 MG per tablet     Sig: Take 1 tablet by mouth daily     Dispense:  90 tablet     Refill:  3     amLODIPine (NORVASC) 5 MG tablet     Sig: Take 1 tablet (5 mg) by mouth daily     Dispense:  90 tablet     Refill:  3       Medications Discontinued During This Encounter   Medication Reason     metoprolol (TOPROL XL) 50 MG 24 hr tablet Reorder     lisinopril-hydrochlorothiazide (PRINZIDE/ZESTORETIC) 10-12.5 MG per tablet Reorder     amLODIPine (NORVASC) 5 MG tablet Reorder         Encounter Diagnoses   Name Primary?     Cardiac pacemaker in situ      Complete AV block (H) Yes     Essential hypertension      Essential hypertension with goal blood pressure less than 140/90        CURRENT MEDICATIONS:  Current Outpatient Prescriptions   Medication Sig Dispense Refill     amLODIPine (NORVASC) 5 MG tablet Take 1 tablet (5 mg) by mouth daily 90 tablet 3     aspirin 81 MG tablet Take 81 mg by mouth daily       carbidopa-levodopa (SINEMET)  MG per tablet Take 1 tablet by mouth 4 times daily       lisinopril-hydrochlorothiazide (PRINZIDE/ZESTORETIC) 10-12.5 MG per tablet Take 1 tablet by mouth daily 90 tablet 3     metoprolol succinate (TOPROL  XL) 50 MG 24 hr tablet Take 1 tablet (50 mg) by mouth daily 90 tablet 3     ROPINIROLE HCL PO Take by mouth 3 times daily       HYDROcodone-acetaminophen (NORCO) 5-325 MG per tablet Take 1 tablet by mouth every 4 hours as needed for pain (Patient not taking: Reported on 9/14/2018) 15 tablet 0     ondansetron (ZOFRAN-ODT) 4 MG ODT tab Take 1 tablet (4 mg) by mouth every 6 hours as needed for nausea (Patient not taking: Reported on 9/14/2018) 5 tablet 0     [DISCONTINUED] amLODIPine (NORVASC) 5 MG tablet Take 1 tablet (5 mg) by mouth daily 90 tablet 3     [DISCONTINUED] lisinopril-hydrochlorothiazide (PRINZIDE/ZESTORETIC) 10-12.5 MG per tablet Take 1 tablet by mouth daily 90 tablet 3     [DISCONTINUED] metoprolol (TOPROL XL) 50 MG 24 hr tablet Take 1 tablet (50 mg) by mouth daily 90 tablet 3       ALLERGIES     Allergies   Allergen Reactions     Hmg-Coa-R Inhibitors      Penicillamine Rash       PAST MEDICAL HISTORY:  Past Medical History:   Diagnosis Date     Complete AV block (H)     [employer] dual chamber 4/1/2011     Hyperlipidemia      Hypertension      Parkinson disease (H)      Syncope        PAST SURGICAL HISTORY:  Past Surgical History:   Procedure Laterality Date     IMPLANT PACEMAKER         FAMILY HISTORY:  Family History   Problem Relation Age of Onset     C.A.D. Mother      C.A.D. Father        SOCIAL HISTORY:  Social History     Social History     Marital status:      Spouse name: N/A     Number of children: N/A     Years of education: N/A     Social History Main Topics     Smoking status: Never Smoker     Smokeless tobacco: Never Used     Alcohol use Yes      Comment: glass of wine occ     Drug use: None     Sexual activity: Not Asked     Other Topics Concern     Special Diet No     Exercise Yes     walking almost everyday     Social History Narrative       Review of Systems:  Skin:  Negative       Eyes:  Positive for contacts    ENT:  Negative      Respiratory:  Negative for dyspnea on  "exertion;shortness of breath     Cardiovascular:  Negative for;palpitations;chest pain;edema;lightheadedness;dizziness;syncope or near-syncope      Gastroenterology: Negative for melena;hematochezia    Genitourinary:  Negative      Musculoskeletal:  Negative      Neurologic:  Positive for   Parkinsons  Psychiatric:  Negative      Heme/Lymph/Imm:  Negative      Endocrine:  Negative        Physical Exam:  Vitals: BP 90/60  Pulse 72  Ht 1.854 m (6' 0.99\")  Wt 90.4 kg (199 lb 6.4 oz)  BMI 26.31 kg/m2    Constitutional:  cooperative, alert and oriented, well developed, well nourished, in no acute distress        Skin:  warm and dry to the touch, no apparent skin lesions or masses noted   pacemaker incision in the left infraclavicular area was well-healed      Head:  normocephalic, no masses or lesions        Eyes:  sclera white;no xanthalasma;conjunctivae and lids unremarkable;pupils equal and round        Lymph:      ENT:  no pallor or cyanosis, dentition good        Neck:  JVP normal;no carotid bruit        Respiratory:  normal breath sounds, clear to auscultation, normal A-P diameter, normal symmetry, normal respiratory excursion, no use of accessory muscles         Cardiac: regular rhythm, normal S1/S2, no S3 or S4, apical impulse not displaced, no murmurs, gallops or rubs                pulses full and equal                                        GI:  abdomen soft        Extremities and Muscular Skeletal:  no deformities, clubbing, cyanosis, erythema observed;no edema              Neurological:    tremor      Psych:  Alert and Oriented x 3              Thank you for allowing me to participate in the care of your patient.      Sincerely,     Janki Casarez PA-C     [facility]    cc:   Janki Casarez PA-C  [address] [place] [id number]        "

## 2018-09-14 NOTE — PATIENT INSTRUCTIONS
1. Reviewed echocardiogram showing you still have normal heart strength - this is good news!  All valves look good as well.   Only significant change compared with 2011 is that the aortic root and ascending aorta are slightly more enlarged. This can happen with age. Treatment is making sure BP stays nice and low    2. Reviewed BP - it was higher at the time of echo at 130, but 90 when you saw me. Will not make any changes right now, but CALL if you start getting symptoms of low BP (wooziness, dizziness)    3. Your EKG and device check looked fine! Device check showed normal pacemaker function.     4. See Dr. Cervantes 1 year with repeat echocardiogram but CALL if issues! [phone]    5. I sent refilled Rxs to [facility].

## 2018-09-14 NOTE — MR AVS SNAPSHOT
After Visit Summary   9/14/2018    Steven Dye    MRN: [id number]           Patient Information     Date Of Birth          1950        Visit Information        Provider Department      9/14/2018 12:30 PM Janki Casarez PA-C [facility]        Today's Diagnoses     Complete AV block (H)    -  1    Cardiac pacemaker in situ        Essential hypertension        Essential hypertension with goal blood pressure less than 140/90          Care Instructions    1. Reviewed echocardiogram showing you still have normal heart strength - this is good news!  All valves look good as well.   Only significant change compared with 2011 is that the aortic root and ascending aorta are slightly more enlarged. This can happen with age. Treatment is making sure BP stays nice and low    2. Reviewed BP - it was higher at the time of echo at 130, but 90 when you saw me. Will not make any changes right now, but CALL if you start getting symptoms of low BP (wooziness, dizziness)    3. Your EKG and device check looked fine! Device check showed normal pacemaker function.     4. See Dr. Cervantes 1 year with repeat echocardiogram but CALL if issues! [phone]    5. I sent refilled Rxs to [facility].           Follow-ups after your visit        Additional Services     Follow-Up with Electrophysiologist                 Your next 10 appointments already scheduled     Oct 11, 2018  8:00 AM CDT   Teletrace with CASEY TECH2   [facility] ([facility])    [address] [place] [id number] [phone] OPT 2              Future tests that were ordered for you today     Open Future Orders        Priority Expected Expires Ordered    EKG 12-lead complete w/read - Clinics (to be scheduled) Routine 9/14/2019 9/15/2019 9/14/2018    Echocardiogram Routine 9/14/2019 9/15/2019 9/14/2018    Follow-Up with Electrophysiologist Routine  "9/14/2019 9/15/2019 9/14/2018    ECHO COMPLETE WITH OPTISON Routine 9/21/2018 9/22/2018 9/21/2017            Who to contact     If you have questions or need follow up information about today's clinic visit or your schedule please contact [facility]   [facility] directly at [phone].  Normal or non-critical lab and imaging results will be communicated to you by MyChart, letter or phone within 4 business days after the clinic has received the results. If you do not hear from us within 7 days, please contact the clinic through [employer] or phone. If you have a critical or abnormal lab result, we will notify you by phone as soon as possible.  Submit refill requests through [employer] or call your pharmacy and they will forward the refill request to us. Please allow 3 business days for your refill to be completed.          Additional Information About Your Visit        [email or web address] Information     [employer] gives you secure access to your electronic health record. If you see a primary care provider, you can also send messages to your care team and make appointments. If you have questions, please call your primary care clinic.  If you do not have a primary care provider, please call [phone] and they will assist you.        Care EveryWhere ID     This is your Care EveryWhere ID. This could be used by other organizations to access your [facility] medical records  [id number]        Your Vitals Were     Pulse Height BMI (Body Mass Index)             72 1.854 m (6' 0.99\") 26.31 kg/m2          Blood Pressure from Last 3 Encounters:   09/14/18 90/60   03/15/18 118/65   09/21/17 112/74    Weight from Last 3 Encounters:   09/14/18 90.4 kg (199 lb 6.4 oz)   03/15/18 97.5 kg (215 lb)   09/21/17 94.1 kg (207 lb 8 oz)              We Performed the Following     EKG 12-lead complete w/read - Clinics (performed today)     Follow-Up with Electrophysiologist          Where to get your medicines      These " medications were sent to [facility]/pharmacy #[id number] - [place], [place] - [address] [facility]  [address] [place] [address]     Phone:  [phone]     amLODIPine 5 MG tablet    lisinopril-hydrochlorothiazide 10-12.5 MG per tablet    metoprolol succinate 50 MG 24 hr tablet          Primary Care Provider Fax #    Physician No Ref-Primary [phone]       No address on file        Equal Access to Services     RAVEN SCHULTZ : Hadii aad ku hadasho Soomaali, waaxda luqadaha, qaybta kaalmada adeegyada, waxay idiin hayewelinan adeeg mariveleric velazquez . So [facility] [phone].    ATENCIÓN: Si jose angel walton, tiene a topete disposición servicios gratuitos de asistencia lingüística. Leonilaame al [phone].    We comply with applicable federal civil rights laws and [place] laws. We do not discriminate on the basis of race, color, national origin, age, disability, sex, sexual orientation, or gender identity.            Thank you!     Thank you for choosing [facility]  for your care. Our goal is always to provide you with excellent care. Hearing back from our patients is one way we can continue to improve our services. Please take a few minutes to complete the written survey that you may receive in the mail after your visit with us. Thank you!             Your Updated Medication List - Protect others around you: Learn how to safely use, store and throw away your medicines at www.disposemymeds.org.          This list is accurate as of 9/14/18  1:02 PM.  Always use your most recent med list.                   Brand Name Dispense Instructions for use Diagnosis    amLODIPine 5 MG tablet    NORVASC    90 tablet    Take 1 tablet (5 mg) by mouth daily    Essential hypertension with goal blood pressure less than 140/90       aspirin 81 MG tablet      Take 81 mg by mouth daily        carbidopa-levodopa  MG per tablet    SINEMET     Take 1 tablet by mouth 4 times daily        HYDROcodone-acetaminophen 5-325 MG per  tablet    NORCO    15 tablet    Take 1 tablet by mouth every 4 hours as needed for pain        lisinopril-hydrochlorothiazide 10-12.5 MG per tablet    PRINZIDE/ZESTORETIC    90 tablet    Take 1 tablet by mouth daily    Essential hypertension with goal blood pressure less than 140/90       metoprolol succinate 50 MG 24 hr tablet    TOPROL XL    90 tablet    Take 1 tablet (50 mg) by mouth daily    Essential hypertension with goal blood pressure less than 140/90       ondansetron 4 MG ODT tab    ZOFRAN-ODT    5 tablet    Take 1 tablet (4 mg) by mouth every 6 hours as needed for nausea        ROPINIROLE HCL PO      Take by mouth 3 times daily

## 2018-10-11 ENCOUNTER — ALLIED HEALTH/NURSE VISIT (OUTPATIENT)
Dept: CARDIOLOGY | Facility: CLINIC | Age: 68
End: 2018-10-11
Payer: COMMERCIAL

## 2018-10-11 DIAGNOSIS — Z95.0 CARDIAC PACEMAKER IN SITU: Primary | ICD-10-CM

## 2018-10-11 PROCEDURE — 93293 PM PHONE R-STRIP DEVICE EVAL: CPT | Performed by: INTERNAL MEDICINE

## 2018-10-11 NOTE — PROGRESS NOTES
~ 90 day office teletrace ~  Appropriate AS/ at time of check,. Magnet response WNL. Six month threshold scheduled in January. Order in for annual OV to be scheduled in September 2019. Jackie BEATTY

## 2018-10-11 NOTE — MR AVS SNAPSHOT
After Visit Summary   10/11/2018    Steven Dye    MRN: [id number]           Patient Information     Date Of Birth          1950        Visit Information        Provider Department      10/11/2018 8:00 AM CARMELA LUCAS [facility]        Today's Diagnoses     Cardiac pacemaker in situ    -  1       Follow-ups after your visit        Your next 10 appointments already scheduled     Jan 11, 2019  9:00 AM CST   Pacemaker Check with CARMELA LIVINGSTONN   [facility] ([facility])    [address] [place] [address] [address]  [facility] [address] [phone] OPT 2              Who to contact     If you have questions or need follow up information about today's clinic visit or your schedule please contact [facility] directly at [phone].  Normal or non-critical lab and imaging results will be communicated to you by [employer], letter or phone within 4 business days after the clinic has received the results. If you do not hear from us within 7 days, please contact the clinic through [employer] or phone. If you have a critical or abnormal lab result, we will notify you by phone as soon as possible.  Submit refill requests through [employer] or call your pharmacy and they will forward the refill request to us. Please allow 3 business days for your refill to be completed.          Additional Information About Your Visit        MyChart Information     [employer] gives you secure access to your electronic health record. If you see a primary care provider, you can also send messages to your care team and make appointments. If you have questions, please call your primary care clinic.  If you do not have a primary care provider, please call [phone] and they will assist you.        Care EveryWhere ID     This is your Care EveryWhere ID. This could be used by other organizations to access your [facility]  records  [id number]         Blood Pressure from Last 3 Encounters:   09/14/18 90/60   03/15/18 118/65   09/21/17 112/74    Weight from Last 3 Encounters:   09/14/18 90.4 kg (199 lb 6.4 oz)   03/15/18 97.5 kg (215 lb)   09/21/17 94.1 kg (207 lb 8 oz)              We Performed the Following     PM PHONE R STRIP EVAL UP TO 90 DAYS ([id number])        Primary Care Provider Fax #    Physician No Ref-Primary [phone]       No address on file        Equal Access to Services     [facility] : Hadii randolph castellanos Soniall, waaxda luqadaha, qaybta kaalmada john, ana velazquez . So [facility] [phone].    ATENCIÓN: Si habla español, tiene a topete disposición servicios gratuitos de asistencia lingüística. LlGrand [facility] [phone].    We comply with applicable federal civil rights laws and [place] laws. We do not discriminate on the basis of race, color, national origin, age, disability, sex, sexual orientation, or gender identity.            Thank you!     Thank you for choosing [facility]  for your care. Our goal is always to provide you with excellent care. Hearing back from our patients is one way we can continue to improve our services. Please take a few minutes to complete the written survey that you may receive in the mail after your visit with us. Thank you!             Your Updated Medication List - Protect others around you: Learn how to safely use, store and throw away your medicines at www.disposemymeds.org.          This list is accurate as of 10/11/18  8:09 AM.  Always use your most recent med list.                   Brand Name Dispense Instructions for use Diagnosis    amLODIPine 5 MG tablet    NORVASC    90 tablet    Take 1 tablet (5 mg) by mouth daily    Essential hypertension with goal blood pressure less than 140/90       aspirin 81 MG tablet      Take 81 mg by mouth daily        carbidopa-levodopa  MG per tablet    SINEMET     Take 1  tablet by mouth 4 times daily        HYDROcodone-acetaminophen 5-325 MG per tablet    NORCO    15 tablet    Take 1 tablet by mouth every 4 hours as needed for pain        lisinopril-hydrochlorothiazide 10-12.5 MG per tablet    PRINZIDE/ZESTORETIC    90 tablet    Take 1 tablet by mouth daily    Essential hypertension with goal blood pressure less than 140/90       metoprolol succinate 50 MG 24 hr tablet    TOPROL XL    90 tablet    Take 1 tablet (50 mg) by mouth daily    Essential hypertension with goal blood pressure less than 140/90       ondansetron 4 MG ODT tab    ZOFRAN-ODT    5 tablet    Take 1 tablet (4 mg) by mouth every 6 hours as needed for nausea        ROPINIROLE HCL PO      Take by mouth 3 times daily

## 2019-01-11 ENCOUNTER — ANCILLARY PROCEDURE (OUTPATIENT)
Dept: CARDIOLOGY | Facility: CLINIC | Age: 69
End: 2019-01-11
Attending: INTERNAL MEDICINE
Payer: COMMERCIAL

## 2019-01-11 DIAGNOSIS — I44.2 COMPLETE AV BLOCK (H): ICD-10-CM

## 2019-01-11 PROCEDURE — 93280 PM DEVICE PROGR EVAL DUAL: CPT | Performed by: INTERNAL MEDICINE

## 2019-04-11 ENCOUNTER — ANCILLARY PROCEDURE (OUTPATIENT)
Dept: CARDIOLOGY | Facility: CLINIC | Age: 69
End: 2019-04-11
Payer: COMMERCIAL

## 2019-04-11 DIAGNOSIS — Z95.0 CARDIAC PACEMAKER IN SITU: Primary | ICD-10-CM

## 2019-04-11 DIAGNOSIS — I44.2 COMPLETE AV BLOCK (H): ICD-10-CM

## 2019-04-11 PROCEDURE — 93293 PM PHONE R-STRIP DEVICE EVAL: CPT | Performed by: INTERNAL MEDICINE

## 2019-04-25 LAB
MDC_IDC_LEAD_IMPLANT_DT: NORMAL
MDC_IDC_LEAD_IMPLANT_DT: NORMAL
MDC_IDC_LEAD_LOCATION: NORMAL
MDC_IDC_LEAD_LOCATION: NORMAL
MDC_IDC_LEAD_LOCATION_DETAIL_1: NORMAL
MDC_IDC_LEAD_LOCATION_DETAIL_1: NORMAL
MDC_IDC_LEAD_MFG: NORMAL
MDC_IDC_LEAD_MFG: NORMAL
MDC_IDC_LEAD_MODEL: NORMAL
MDC_IDC_LEAD_MODEL: NORMAL
MDC_IDC_LEAD_POLARITY_TYPE: NORMAL
MDC_IDC_LEAD_POLARITY_TYPE: NORMAL
MDC_IDC_LEAD_SERIAL: NORMAL
MDC_IDC_LEAD_SERIAL: NORMAL
MDC_IDC_PG_IMPLANT_DTM: NORMAL
MDC_IDC_PG_MFG: NORMAL
MDC_IDC_PG_MODEL: NORMAL
MDC_IDC_PG_SERIAL: NORMAL
MDC_IDC_PG_TYPE: NORMAL
MDC_IDC_SESS_CLINIC_NAME: NORMAL
MDC_IDC_SESS_DTM: NORMAL
MDC_IDC_SESS_TYPE: NORMAL

## 2019-07-11 ENCOUNTER — ANCILLARY PROCEDURE (OUTPATIENT)
Dept: CARDIOLOGY | Facility: CLINIC | Age: 69
End: 2019-07-11
Attending: INTERNAL MEDICINE
Payer: COMMERCIAL

## 2019-07-11 DIAGNOSIS — Z95.0 CARDIAC PACEMAKER IN SITU: ICD-10-CM

## 2019-07-11 PROCEDURE — 93293 PM PHONE R-STRIP DEVICE EVAL: CPT | Performed by: INTERNAL MEDICINE

## 2019-09-24 ENCOUNTER — OFFICE VISIT (OUTPATIENT)
Dept: CARDIOLOGY | Facility: CLINIC | Age: 69
End: 2019-09-24
Payer: COMMERCIAL

## 2019-09-24 ENCOUNTER — HOSPITAL ENCOUNTER (OUTPATIENT)
Dept: CARDIOLOGY | Facility: CLINIC | Age: 69
Discharge: HOME | End: 2019-09-24
Attending: PHYSICIAN ASSISTANT | Admitting: PHYSICIAN ASSISTANT
Payer: COMMERCIAL

## 2019-09-24 VITALS
DIASTOLIC BLOOD PRESSURE: 77 MMHG | HEIGHT: 73 IN | WEIGHT: 193 LBS | SYSTOLIC BLOOD PRESSURE: 117 MMHG | BODY MASS INDEX: 25.58 KG/M2 | HEART RATE: 75 BPM

## 2019-09-24 DIAGNOSIS — Z95.0 CARDIAC PACEMAKER IN SITU: ICD-10-CM

## 2019-09-24 DIAGNOSIS — I10 ESSENTIAL HYPERTENSION WITH GOAL BLOOD PRESSURE LESS THAN 140/90: ICD-10-CM

## 2019-09-24 DIAGNOSIS — I10 ESSENTIAL HYPERTENSION: ICD-10-CM

## 2019-09-24 DIAGNOSIS — Z95.0 CARDIAC PACEMAKER IN SITU: Primary | ICD-10-CM

## 2019-09-24 DIAGNOSIS — I44.2 COMPLETE AV BLOCK (H): ICD-10-CM

## 2019-09-24 PROCEDURE — 93306 TTE W/DOPPLER COMPLETE: CPT | Mod: 26 | Performed by: INTERNAL MEDICINE

## 2019-09-24 PROCEDURE — 93000 ELECTROCARDIOGRAM COMPLETE: CPT | Performed by: INTERNAL MEDICINE

## 2019-09-24 PROCEDURE — 99213 OFFICE O/P EST LOW 20 MIN: CPT | Performed by: INTERNAL MEDICINE

## 2019-09-24 PROCEDURE — 40000264 ECHOCARDIOGRAM COMPLETE

## 2019-09-24 PROCEDURE — 25500064 ZZH RX 255 OP 636: Performed by: PHYSICIAN ASSISTANT

## 2019-09-24 RX ORDER — AMLODIPINE BESYLATE 5 MG/1
5 TABLET ORAL DAILY
Qty: 90 TABLET | Refills: 3 | Status: SHIPPED | OUTPATIENT
Start: 2019-09-24 | End: 2020-09-09

## 2019-09-24 RX ORDER — METOPROLOL SUCCINATE 50 MG/1
50 TABLET, EXTENDED RELEASE ORAL DAILY
Qty: 90 TABLET | Refills: 3 | Status: SHIPPED | OUTPATIENT
Start: 2019-09-24 | End: 2020-08-21

## 2019-09-24 RX ORDER — LISINOPRIL/HYDROCHLOROTHIAZIDE 10-12.5 MG
1 TABLET ORAL DAILY
Qty: 90 TABLET | Refills: 3 | Status: SHIPPED | OUTPATIENT
Start: 2019-09-24 | End: 2020-10-20

## 2019-09-24 RX ADMIN — HUMAN ALBUMIN MICROSPHERES AND PERFLUTREN 3 ML: 10; .22 INJECTION, SOLUTION INTRAVENOUS at 11:41

## 2019-09-24 ASSESSMENT — MIFFLIN-ST. JEOR: SCORE: 1694.32

## 2019-09-24 NOTE — PROGRESS NOTES
Service Date: 2019      HISTORY OF PRESENT ILLNESS:  I saw Mr. Baker for followup of AV block, status post pacemaker implantation.  He first received pacemaker implantation for suspected AV block and syncope on 2011.  He has been in chronic AV block and is getting 100% ventricular pacing.  Symptomatically, he is doing well with no shortness of breath, palpitation or chest pain.  He has Parkinson's disease, and the condition is relatively stable on medical therapy.      PHYSICAL EXAMINATION:   VITAL SIGNS:  Blood pressure was 117/77, heart rate 75 beats per minute, body weight 193 pounds.   CHEST:  The pacemaker site looked well.   LUNGS:  Clear.   CARDIAC:  Rhythm was regular, and the heart sounds were normal without murmur.  The echocardiogram today reported normal LV function without other remarkable abnormalities.      ASSESSMENT AND RECOMMENDATIONS:  Mr. Baker is doing well symptomatically.  His cardiac condition is stable, and the pacemaker function is normal.  He will have a close pacemaker followup for battery status in the next few years, and we will schedule him for replacement when the battery reaches SUJATA.  Otherwise, he will continue current medications and return for followup in a year.  If he remains stable, he can see Ms. Oumou Casarez annually and see me again only as needed.         LU CHATTERJEE MD             D: 2019   T: 2019   MT: FRANCIA      Name:     MARLO BAKER   MRN:      [id number]        Account:      [id number]   :      1950           Service Date: 2019      Document: [id number]

## 2019-09-24 NOTE — LETTER
9/24/2019    Physician No Ref-Primary  No address on file    RE: Steven Dye       Dear Colleague,    I had the pleasure of seeing Steven Dye in the [facility].    HPI and Plan:   See dictation    Orders Placed This Encounter   Procedures     Follow-Up with Cardiac Advanced Practice Provider     EKG 12-lead complete w/read - Clinics (performed today)       No orders of the defined types were placed in this encounter.      There are no discontinued medications.      Encounter Diagnosis   Name Primary?     Cardiac pacemaker in situ Yes       CURRENT MEDICATIONS:  Current Outpatient Medications   Medication Sig Dispense Refill     amLODIPine (NORVASC) 5 MG tablet Take 1 tablet (5 mg) by mouth daily 90 tablet 3     aspirin 81 MG tablet Take 81 mg by mouth daily       carbidopa-levodopa (SINEMET)  MG per tablet Take 1 tablet by mouth 4 times daily       HYDROcodone-acetaminophen (NORCO) 5-325 MG per tablet Take 1 tablet by mouth every 4 hours as needed for pain 15 tablet 0     lisinopril-hydrochlorothiazide (PRINZIDE/ZESTORETIC) 10-12.5 MG per tablet Take 1 tablet by mouth daily 90 tablet 3     metoprolol succinate (TOPROL XL) 50 MG 24 hr tablet Take 1 tablet (50 mg) by mouth daily 90 tablet 3     ondansetron (ZOFRAN-ODT) 4 MG ODT tab Take 1 tablet (4 mg) by mouth every 6 hours as needed for nausea 5 tablet 0     ROPINIROLE HCL PO Take by mouth 3 times daily         ALLERGIES     Allergies   Allergen Reactions     Hmg-Coa-R Inhibitors      Penicillamine Rash       PAST MEDICAL HISTORY:  Past Medical History:   Diagnosis Date     Complete AV block (H)     [employer] dual chamber 4/1/2011     Hyperlipidemia      Hypertension      Parkinson disease (H)      Syncope        PAST SURGICAL HISTORY:  Past Surgical History:   Procedure Laterality Date     IMPLANT PACEMAKER         FAMILY HISTORY:  Family History   Problem Relation Age of Onset     C.A.D. Mother       MICHELLEACitlalyDCitlaly Father        SOCIAL HISTORY:  Social History     Socioeconomic History     Marital status:      Spouse name: None     Number of children: None     Years of education: None     Highest education level: None   Occupational History     None   Social Needs     Financial resource strain: None     Food insecurity:     Worry: None     Inability: None     Transportation needs:     Medical: None     Non-medical: None   Tobacco Use     Smoking status: Never Smoker     Smokeless tobacco: Never Used   Substance and Sexual Activity     Alcohol use: Yes     Comment: glass of wine occ     Drug use: None     Sexual activity: None   Lifestyle     Physical activity:     Days per week: None     Minutes per session: None     Stress: None   Relationships     Social connections:     Talks on phone: None     Gets together: None     Attends Presybeterian service: None     Active member of club or organization: None     Attends meetings of clubs or organizations: None     Relationship status: None     Intimate partner violence:     Fear of current or ex partner: None     Emotionally abused: None     Physically abused: None     Forced sexual activity: None   Other Topics Concern     Parent/sibling w/ CABG, MI or angioplasty before 65F 55M? Not Asked      Service Not Asked     Blood Transfusions Not Asked     Caffeine Concern Not Asked     Occupational Exposure Not Asked     Hobby Hazards Not Asked     Sleep Concern Not Asked     Stress Concern Not Asked     Weight Concern Not Asked     Special Diet No     Back Care Not Asked     Exercise Yes     Comment: walking almost everyday     Bike Helmet Not Asked     Seat Belt Not Asked     Self-Exams Not Asked   Social History Narrative     None       Review of Systems:  Skin:  Negative       Eyes:  Positive for contacts    ENT:  Negative      Respiratory:  Negative       Cardiovascular:  Negative      Gastroenterology: Negative for melena;hematochezia    Genitourinary:  Negative    "   Musculoskeletal:  Negative      Neurologic:  Positive for   Parkinsons  Psychiatric:  Negative      Heme/Lymph/Imm:  Negative      Endocrine:  Negative        Physical Exam:  Vitals: /77   Pulse 75   Ht 1.854 m (6' 1\")   Wt 87.5 kg (193 lb)   BMI 25.46 kg/m       Constitutional:  cooperative, alert and oriented, well developed, well nourished, in no acute distress        Skin:  warm and dry to the touch, no apparent skin lesions or masses noted   pacemaker incision in the left infraclavicular area was well-healed      Head:  normocephalic, no masses or lesions        Eyes:  sclera white;no xanthalasma;conjunctivae and lids unremarkable;pupils equal and round        Lymph:      ENT:  no pallor or cyanosis, dentition good        Neck:  JVP normal;no carotid bruit        Respiratory:  normal breath sounds, clear to auscultation, normal A-P diameter, normal symmetry, normal respiratory excursion, no use of accessory muscles         Cardiac: regular rhythm, normal S1/S2, no S3 or S4, apical impulse not displaced, no murmurs, gallops or rubs                pulses full and equal                                        GI:  abdomen soft        Extremities and Muscular Skeletal:  no deformities, clubbing, cyanosis, erythema observed;no edema              Neurological:    tremor      Psych:  Alert and Oriented x 3        CC  No referring provider defined for this encounter.                Thank you for allowing me to participate in the care of your patient.      Sincerely,     Edin Cervantes MD     [facility]    cc:   No referring provider defined for this encounter.        "

## 2019-09-24 NOTE — PROGRESS NOTES
HPI and Plan:   See dictation    Orders Placed This Encounter   Procedures     Follow-Up with Cardiac Advanced Practice Provider     EKG 12-lead complete w/read - Clinics (performed today)       No orders of the defined types were placed in this encounter.      There are no discontinued medications.      Encounter Diagnosis   Name Primary?     Cardiac pacemaker in situ Yes       CURRENT MEDICATIONS:  Current Outpatient Medications   Medication Sig Dispense Refill     amLODIPine (NORVASC) 5 MG tablet Take 1 tablet (5 mg) by mouth daily 90 tablet 3     aspirin 81 MG tablet Take 81 mg by mouth daily       carbidopa-levodopa (SINEMET)  MG per tablet Take 1 tablet by mouth 4 times daily       HYDROcodone-acetaminophen (NORCO) 5-325 MG per tablet Take 1 tablet by mouth every 4 hours as needed for pain 15 tablet 0     lisinopril-hydrochlorothiazide (PRINZIDE/ZESTORETIC) 10-12.5 MG per tablet Take 1 tablet by mouth daily 90 tablet 3     metoprolol succinate (TOPROL XL) 50 MG 24 hr tablet Take 1 tablet (50 mg) by mouth daily 90 tablet 3     ondansetron (ZOFRAN-ODT) 4 MG ODT tab Take 1 tablet (4 mg) by mouth every 6 hours as needed for nausea 5 tablet 0     ROPINIROLE HCL PO Take by mouth 3 times daily         ALLERGIES     Allergies   Allergen Reactions     Hmg-Coa-R Inhibitors      Penicillamine Rash       PAST MEDICAL HISTORY:  Past Medical History:   Diagnosis Date     Complete AV block (H)     [employer] dual chamber 4/1/2011     Hyperlipidemia      Hypertension      Parkinson disease (H)      Syncope        PAST SURGICAL HISTORY:  Past Surgical History:   Procedure Laterality Date     IMPLANT PACEMAKER         FAMILY HISTORY:  Family History   Problem Relation Age of Onset     C.A.D. Mother      C.A.D. Father        SOCIAL HISTORY:  Social History     Socioeconomic History     Marital status:      Spouse name: None     Number of children: None     Years of education: None     Highest education level:  None   Occupational History     None   Social Needs     Financial resource strain: None     Food insecurity:     Worry: None     Inability: None     Transportation needs:     Medical: None     Non-medical: None   Tobacco Use     Smoking status: Never Smoker     Smokeless tobacco: Never Used   Substance and Sexual Activity     Alcohol use: Yes     Comment: glass of wine occ     Drug use: None     Sexual activity: None   Lifestyle     Physical activity:     Days per week: None     Minutes per session: None     Stress: None   Relationships     Social connections:     Talks on phone: None     Gets together: None     Attends Yarsani service: None     Active member of club or organization: None     Attends meetings of clubs or organizations: None     Relationship status: None     Intimate partner violence:     Fear of current or ex partner: None     Emotionally abused: None     Physically abused: None     Forced sexual activity: None   Other Topics Concern     Parent/sibling w/ CABG, MI or angioplasty before 65F 55M? Not Asked      Service Not Asked     Blood Transfusions Not Asked     Caffeine Concern Not Asked     Occupational Exposure Not Asked     Hobby Hazards Not Asked     Sleep Concern Not Asked     Stress Concern Not Asked     Weight Concern Not Asked     Special Diet No     Back Care Not Asked     Exercise Yes     Comment: walking almost everyday     Bike Helmet Not Asked     Seat Belt Not Asked     Self-Exams Not Asked   Social History Narrative     None       Review of Systems:  Skin:  Negative       Eyes:  Positive for contacts    ENT:  Negative      Respiratory:  Negative       Cardiovascular:  Negative      Gastroenterology: Negative for melena;hematochezia    Genitourinary:  Negative      Musculoskeletal:  Negative      Neurologic:  Positive for   Parkinsons  Psychiatric:  Negative      Heme/Lymph/Imm:  Negative      Endocrine:  Negative        Physical Exam:  Vitals: /77   Pulse 75   Ht  "1.854 m (6' 1\")   Wt 87.5 kg (193 lb)   BMI 25.46 kg/m      Constitutional:  cooperative, alert and oriented, well developed, well nourished, in no acute distress        Skin:  warm and dry to the touch, no apparent skin lesions or masses noted   pacemaker incision in the left infraclavicular area was well-healed      Head:  normocephalic, no masses or lesions        Eyes:  sclera white;no xanthalasma;conjunctivae and lids unremarkable;pupils equal and round        Lymph:      ENT:  no pallor or cyanosis, dentition good        Neck:  JVP normal;no carotid bruit        Respiratory:  normal breath sounds, clear to auscultation, normal A-P diameter, normal symmetry, normal respiratory excursion, no use of accessory muscles         Cardiac: regular rhythm, normal S1/S2, no S3 or S4, apical impulse not displaced, no murmurs, gallops or rubs                pulses full and equal                                        GI:  abdomen soft        Extremities and Muscular Skeletal:  no deformities, clubbing, cyanosis, erythema observed;no edema              Neurological:    tremor      Psych:  Alert and Oriented x 3        CC  No referring provider defined for this encounter.              "

## 2019-10-17 ENCOUNTER — ANCILLARY PROCEDURE (OUTPATIENT)
Dept: CARDIOLOGY | Facility: CLINIC | Age: 69
End: 2019-10-17
Attending: INTERNAL MEDICINE
Payer: COMMERCIAL

## 2019-10-17 DIAGNOSIS — Z95.0 CARDIAC PACEMAKER IN SITU: ICD-10-CM

## 2019-10-17 PROCEDURE — 93293 PM PHONE R-STRIP DEVICE EVAL: CPT | Performed by: INTERNAL MEDICINE

## 2019-11-04 LAB
MDC_IDC_LEAD_IMPLANT_DT: NORMAL
MDC_IDC_LEAD_IMPLANT_DT: NORMAL
MDC_IDC_LEAD_LOCATION: NORMAL
MDC_IDC_LEAD_LOCATION: NORMAL
MDC_IDC_LEAD_LOCATION_DETAIL_1: NORMAL
MDC_IDC_LEAD_LOCATION_DETAIL_1: NORMAL
MDC_IDC_LEAD_MFG: NORMAL
MDC_IDC_LEAD_MFG: NORMAL
MDC_IDC_LEAD_MODEL: NORMAL
MDC_IDC_LEAD_MODEL: NORMAL
MDC_IDC_LEAD_POLARITY_TYPE: NORMAL
MDC_IDC_LEAD_POLARITY_TYPE: NORMAL
MDC_IDC_LEAD_SERIAL: NORMAL
MDC_IDC_LEAD_SERIAL: NORMAL
MDC_IDC_MSMT_BATTERY_STATUS: NORMAL
MDC_IDC_MSMT_LEADCHNL_RA_IMPEDANCE_VALUE: 640 OHM
MDC_IDC_MSMT_LEADCHNL_RA_PACING_THRESHOLD_AMPLITUDE: 0.8 V
MDC_IDC_MSMT_LEADCHNL_RA_PACING_THRESHOLD_PULSEWIDTH: 0.5 MS
MDC_IDC_MSMT_LEADCHNL_RA_SENSING_INTR_AMPL: 3 MV
MDC_IDC_MSMT_LEADCHNL_RV_IMPEDANCE_VALUE: 720 OHM
MDC_IDC_MSMT_LEADCHNL_RV_PACING_THRESHOLD_AMPLITUDE: 0.8 V
MDC_IDC_MSMT_LEADCHNL_RV_PACING_THRESHOLD_PULSEWIDTH: 0.4 MS
MDC_IDC_PG_IMPLANT_DTM: NORMAL
MDC_IDC_PG_MFG: NORMAL
MDC_IDC_PG_MODEL: NORMAL
MDC_IDC_PG_SERIAL: NORMAL
MDC_IDC_PG_TYPE: NORMAL
MDC_IDC_SESS_CLINIC_NAME: NORMAL
MDC_IDC_SESS_DTM: NORMAL
MDC_IDC_SESS_TYPE: NORMAL
MDC_IDC_SET_BRADY_AT_MODE_SWITCH_MODE: NORMAL
MDC_IDC_SET_BRADY_AT_MODE_SWITCH_RATE: 170 {BEATS}/MIN
MDC_IDC_SET_BRADY_HYSTRATE: NORMAL
MDC_IDC_SET_BRADY_LOWRATE: 60 {BEATS}/MIN
MDC_IDC_SET_BRADY_MAX_SENSOR_RATE: 130 {BEATS}/MIN
MDC_IDC_SET_BRADY_MAX_TRACKING_RATE: 130 {BEATS}/MIN
MDC_IDC_SET_BRADY_MODE: NORMAL
MDC_IDC_SET_BRADY_PAV_DELAY_HIGH: 180 MS
MDC_IDC_SET_BRADY_PAV_DELAY_LOW: 250 MS
MDC_IDC_SET_BRADY_SAV_DELAY_HIGH: 180 MS
MDC_IDC_SET_BRADY_SAV_DELAY_LOW: 250 MS
MDC_IDC_SET_LEADCHNL_RA_PACING_AMPLITUDE: 2 V
MDC_IDC_SET_LEADCHNL_RA_PACING_ANODE_ELECTRODE_1: NORMAL
MDC_IDC_SET_LEADCHNL_RA_PACING_ANODE_LOCATION_1: NORMAL
MDC_IDC_SET_LEADCHNL_RA_PACING_CAPTURE_MODE: NORMAL
MDC_IDC_SET_LEADCHNL_RA_PACING_CATHODE_ELECTRODE_1: NORMAL
MDC_IDC_SET_LEADCHNL_RA_PACING_CATHODE_LOCATION_1: NORMAL
MDC_IDC_SET_LEADCHNL_RA_PACING_POLARITY: NORMAL
MDC_IDC_SET_LEADCHNL_RA_PACING_PULSEWIDTH: 0.5 MS
MDC_IDC_SET_LEADCHNL_RA_SENSING_ADAPTATION_MODE: NORMAL
MDC_IDC_SET_LEADCHNL_RA_SENSING_ANODE_ELECTRODE_1: NORMAL
MDC_IDC_SET_LEADCHNL_RA_SENSING_ANODE_LOCATION_1: NORMAL
MDC_IDC_SET_LEADCHNL_RA_SENSING_CATHODE_ELECTRODE_1: NORMAL
MDC_IDC_SET_LEADCHNL_RA_SENSING_CATHODE_LOCATION_1: NORMAL
MDC_IDC_SET_LEADCHNL_RA_SENSING_POLARITY: NORMAL
MDC_IDC_SET_LEADCHNL_RA_SENSING_SENSITIVITY: 0.5 MV
MDC_IDC_SET_LEADCHNL_RV_PACING_AMPLITUDE: 1.4 V
MDC_IDC_SET_LEADCHNL_RV_PACING_ANODE_ELECTRODE_1: NORMAL
MDC_IDC_SET_LEADCHNL_RV_PACING_ANODE_LOCATION_1: NORMAL
MDC_IDC_SET_LEADCHNL_RV_PACING_CAPTURE_MODE: NORMAL
MDC_IDC_SET_LEADCHNL_RV_PACING_CATHODE_ELECTRODE_1: NORMAL
MDC_IDC_SET_LEADCHNL_RV_PACING_CATHODE_LOCATION_1: NORMAL
MDC_IDC_SET_LEADCHNL_RV_PACING_POLARITY: NORMAL
MDC_IDC_SET_LEADCHNL_RV_PACING_PULSEWIDTH: 0.4 MS
MDC_IDC_SET_LEADCHNL_RV_SENSING_ADAPTATION_MODE: NORMAL
MDC_IDC_SET_LEADCHNL_RV_SENSING_ANODE_ELECTRODE_1: NORMAL
MDC_IDC_SET_LEADCHNL_RV_SENSING_ANODE_LOCATION_1: NORMAL
MDC_IDC_SET_LEADCHNL_RV_SENSING_CATHODE_ELECTRODE_1: NORMAL
MDC_IDC_SET_LEADCHNL_RV_SENSING_CATHODE_LOCATION_1: NORMAL
MDC_IDC_SET_LEADCHNL_RV_SENSING_POLARITY: NORMAL
MDC_IDC_SET_LEADCHNL_RV_SENSING_SENSITIVITY: 2.5 MV
MDC_IDC_STAT_AT_DTM_END: NORMAL
MDC_IDC_STAT_AT_DTM_START: NORMAL
MDC_IDC_STAT_AT_MODE_SW_COUNT: 0
MDC_IDC_STAT_AT_MODE_SW_MAX_DURATION: 0 S
MDC_IDC_STAT_BRADY_DTM_END: NORMAL
MDC_IDC_STAT_BRADY_DTM_START: NORMAL
MDC_IDC_STAT_BRADY_RA_PERCENT_PACED: 11 %
MDC_IDC_STAT_BRADY_RV_PERCENT_PACED: 100 %
MDC_IDC_STAT_EPISODE_RECENT_COUNT: 0
MDC_IDC_STAT_EPISODE_RECENT_COUNT_DTM_END: NORMAL
MDC_IDC_STAT_EPISODE_RECENT_COUNT_DTM_START: NORMAL
MDC_IDC_STAT_EPISODE_TYPE: NORMAL

## 2019-12-15 ENCOUNTER — HEALTH MAINTENANCE LETTER (OUTPATIENT)
Age: 69
End: 2019-12-15

## 2020-01-21 ENCOUNTER — ANCILLARY PROCEDURE (OUTPATIENT)
Dept: CARDIOLOGY | Facility: CLINIC | Age: 70
End: 2020-01-21
Attending: INTERNAL MEDICINE
Payer: COMMERCIAL

## 2020-01-21 DIAGNOSIS — I44.2 COMPLETE AV BLOCK (H): Primary | ICD-10-CM

## 2020-01-21 DIAGNOSIS — Z95.0 CARDIAC PACEMAKER IN SITU: ICD-10-CM

## 2020-01-21 PROCEDURE — 93280 PM DEVICE PROGR EVAL DUAL: CPT | Performed by: INTERNAL MEDICINE

## 2020-02-03 LAB
MDC_IDC_LEAD_IMPLANT_DT: NORMAL
MDC_IDC_LEAD_IMPLANT_DT: NORMAL
MDC_IDC_LEAD_LOCATION: NORMAL
MDC_IDC_LEAD_LOCATION: NORMAL
MDC_IDC_LEAD_LOCATION_DETAIL_1: NORMAL
MDC_IDC_LEAD_LOCATION_DETAIL_1: NORMAL
MDC_IDC_LEAD_MFG: NORMAL
MDC_IDC_LEAD_MFG: NORMAL
MDC_IDC_LEAD_MODEL: NORMAL
MDC_IDC_LEAD_MODEL: NORMAL
MDC_IDC_LEAD_POLARITY_TYPE: NORMAL
MDC_IDC_LEAD_POLARITY_TYPE: NORMAL
MDC_IDC_LEAD_SERIAL: NORMAL
MDC_IDC_LEAD_SERIAL: NORMAL
MDC_IDC_MSMT_BATTERY_STATUS: NORMAL
MDC_IDC_MSMT_LEADCHNL_RA_IMPEDANCE_VALUE: 640 OHM
MDC_IDC_MSMT_LEADCHNL_RA_PACING_THRESHOLD_AMPLITUDE: 0.6 V
MDC_IDC_MSMT_LEADCHNL_RA_PACING_THRESHOLD_AMPLITUDE: 1 V
MDC_IDC_MSMT_LEADCHNL_RA_PACING_THRESHOLD_PULSEWIDTH: 0.5 MS
MDC_IDC_MSMT_LEADCHNL_RA_PACING_THRESHOLD_PULSEWIDTH: 0.5 MS
MDC_IDC_MSMT_LEADCHNL_RA_SENSING_INTR_AMPL: 3 MV
MDC_IDC_MSMT_LEADCHNL_RV_IMPEDANCE_VALUE: 720 OHM
MDC_IDC_MSMT_LEADCHNL_RV_PACING_THRESHOLD_AMPLITUDE: 0.8 V
MDC_IDC_MSMT_LEADCHNL_RV_PACING_THRESHOLD_PULSEWIDTH: 0.4 MS
MDC_IDC_PG_IMPLANT_DTM: NORMAL
MDC_IDC_PG_MFG: NORMAL
MDC_IDC_PG_MODEL: NORMAL
MDC_IDC_PG_SERIAL: NORMAL
MDC_IDC_PG_TYPE: NORMAL
MDC_IDC_SESS_CLINIC_NAME: NORMAL
MDC_IDC_SESS_DTM: NORMAL
MDC_IDC_SESS_TYPE: NORMAL
MDC_IDC_SET_BRADY_AT_MODE_SWITCH_MODE: NORMAL
MDC_IDC_SET_BRADY_AT_MODE_SWITCH_RATE: 170 {BEATS}/MIN
MDC_IDC_SET_BRADY_HYSTRATE: NORMAL
MDC_IDC_SET_BRADY_LOWRATE: 60 {BEATS}/MIN
MDC_IDC_SET_BRADY_MAX_SENSOR_RATE: 130 {BEATS}/MIN
MDC_IDC_SET_BRADY_MAX_TRACKING_RATE: 130 {BEATS}/MIN
MDC_IDC_SET_BRADY_MODE: NORMAL
MDC_IDC_SET_BRADY_PAV_DELAY_HIGH: 180 MS
MDC_IDC_SET_BRADY_PAV_DELAY_LOW: 250 MS
MDC_IDC_SET_BRADY_SAV_DELAY_HIGH: 180 MS
MDC_IDC_SET_BRADY_SAV_DELAY_LOW: 250 MS
MDC_IDC_SET_LEADCHNL_RA_PACING_AMPLITUDE: 2 V
MDC_IDC_SET_LEADCHNL_RA_PACING_ANODE_ELECTRODE_1: NORMAL
MDC_IDC_SET_LEADCHNL_RA_PACING_ANODE_LOCATION_1: NORMAL
MDC_IDC_SET_LEADCHNL_RA_PACING_CAPTURE_MODE: NORMAL
MDC_IDC_SET_LEADCHNL_RA_PACING_CATHODE_ELECTRODE_1: NORMAL
MDC_IDC_SET_LEADCHNL_RA_PACING_CATHODE_LOCATION_1: NORMAL
MDC_IDC_SET_LEADCHNL_RA_PACING_POLARITY: NORMAL
MDC_IDC_SET_LEADCHNL_RA_PACING_PULSEWIDTH: 0.5 MS
MDC_IDC_SET_LEADCHNL_RA_SENSING_ADAPTATION_MODE: NORMAL
MDC_IDC_SET_LEADCHNL_RA_SENSING_ANODE_ELECTRODE_1: NORMAL
MDC_IDC_SET_LEADCHNL_RA_SENSING_ANODE_LOCATION_1: NORMAL
MDC_IDC_SET_LEADCHNL_RA_SENSING_CATHODE_ELECTRODE_1: NORMAL
MDC_IDC_SET_LEADCHNL_RA_SENSING_CATHODE_LOCATION_1: NORMAL
MDC_IDC_SET_LEADCHNL_RA_SENSING_POLARITY: NORMAL
MDC_IDC_SET_LEADCHNL_RA_SENSING_SENSITIVITY: 0.5 MV
MDC_IDC_SET_LEADCHNL_RV_PACING_AMPLITUDE: 1.4 V
MDC_IDC_SET_LEADCHNL_RV_PACING_ANODE_ELECTRODE_1: NORMAL
MDC_IDC_SET_LEADCHNL_RV_PACING_ANODE_LOCATION_1: NORMAL
MDC_IDC_SET_LEADCHNL_RV_PACING_CAPTURE_MODE: NORMAL
MDC_IDC_SET_LEADCHNL_RV_PACING_CATHODE_ELECTRODE_1: NORMAL
MDC_IDC_SET_LEADCHNL_RV_PACING_CATHODE_LOCATION_1: NORMAL
MDC_IDC_SET_LEADCHNL_RV_PACING_POLARITY: NORMAL
MDC_IDC_SET_LEADCHNL_RV_PACING_PULSEWIDTH: 0.4 MS
MDC_IDC_SET_LEADCHNL_RV_SENSING_ADAPTATION_MODE: NORMAL
MDC_IDC_SET_LEADCHNL_RV_SENSING_ANODE_ELECTRODE_1: NORMAL
MDC_IDC_SET_LEADCHNL_RV_SENSING_ANODE_LOCATION_1: NORMAL
MDC_IDC_SET_LEADCHNL_RV_SENSING_CATHODE_ELECTRODE_1: NORMAL
MDC_IDC_SET_LEADCHNL_RV_SENSING_CATHODE_LOCATION_1: NORMAL
MDC_IDC_SET_LEADCHNL_RV_SENSING_POLARITY: NORMAL
MDC_IDC_SET_LEADCHNL_RV_SENSING_SENSITIVITY: 2.5 MV
MDC_IDC_STAT_AT_DTM_END: NORMAL
MDC_IDC_STAT_AT_DTM_START: NORMAL
MDC_IDC_STAT_AT_MODE_SW_COUNT: 0
MDC_IDC_STAT_AT_MODE_SW_MAX_DURATION: 0 S
MDC_IDC_STAT_BRADY_DTM_END: NORMAL
MDC_IDC_STAT_BRADY_DTM_START: NORMAL
MDC_IDC_STAT_BRADY_RA_PERCENT_PACED: 13 %
MDC_IDC_STAT_BRADY_RV_PERCENT_PACED: 100 %
MDC_IDC_STAT_EPISODE_RECENT_COUNT: 0
MDC_IDC_STAT_EPISODE_RECENT_COUNT_DTM_END: NORMAL
MDC_IDC_STAT_EPISODE_RECENT_COUNT_DTM_START: NORMAL
MDC_IDC_STAT_EPISODE_TYPE: NORMAL

## 2020-05-27 ENCOUNTER — DOCUMENTATION ONLY (OUTPATIENT)
Dept: CARDIOLOGY | Facility: CLINIC | Age: 70
End: 2020-05-27

## 2020-05-27 NOTE — PROGRESS NOTES

## 2020-05-28 ENCOUNTER — ANCILLARY PROCEDURE (OUTPATIENT)
Dept: CARDIOLOGY | Facility: CLINIC | Age: 70
End: 2020-05-28
Attending: INTERNAL MEDICINE
Payer: COMMERCIAL

## 2020-05-28 DIAGNOSIS — Z95.0 CARDIAC PACEMAKER IN SITU: ICD-10-CM

## 2020-05-28 DIAGNOSIS — I44.2 COMPLETE AV BLOCK (H): ICD-10-CM

## 2020-05-28 DIAGNOSIS — I44.2 ATRIOVENTRICULAR BLOCK, COMPLETE (H): Primary | ICD-10-CM

## 2020-05-28 PROCEDURE — 93293 PM PHONE R-STRIP DEVICE EVAL: CPT | Performed by: INTERNAL MEDICINE

## 2020-08-21 DIAGNOSIS — I10 ESSENTIAL HYPERTENSION WITH GOAL BLOOD PRESSURE LESS THAN 140/90: ICD-10-CM

## 2020-08-21 RX ORDER — METOPROLOL SUCCINATE 50 MG/1
50 TABLET, EXTENDED RELEASE ORAL DAILY
Qty: 90 TABLET | Refills: 0 | Status: SHIPPED | OUTPATIENT
Start: 2020-08-21 | End: 2020-10-20

## 2020-09-09 DIAGNOSIS — I10 ESSENTIAL HYPERTENSION WITH GOAL BLOOD PRESSURE LESS THAN 140/90: ICD-10-CM

## 2020-09-09 RX ORDER — AMLODIPINE BESYLATE 5 MG/1
5 TABLET ORAL DAILY
Qty: 90 TABLET | Refills: 0 | Status: SHIPPED | OUTPATIENT
Start: 2020-09-09 | End: 2020-10-20

## 2020-09-10 ENCOUNTER — ANCILLARY PROCEDURE (OUTPATIENT)
Dept: CARDIOLOGY | Facility: CLINIC | Age: 70
End: 2020-09-10
Attending: INTERNAL MEDICINE
Payer: COMMERCIAL

## 2020-09-10 DIAGNOSIS — Z95.0 CARDIAC PACEMAKER IN SITU: ICD-10-CM

## 2020-09-10 DIAGNOSIS — I44.2 ATRIOVENTRICULAR BLOCK, COMPLETE (H): Primary | ICD-10-CM

## 2020-09-10 DIAGNOSIS — I44.2 ATRIOVENTRICULAR BLOCK, COMPLETE (H): ICD-10-CM

## 2020-09-10 PROCEDURE — 93280 PM DEVICE PROGR EVAL DUAL: CPT | Performed by: INTERNAL MEDICINE

## 2020-09-16 LAB
MDC_IDC_LEAD_IMPLANT_DT: NORMAL
MDC_IDC_LEAD_IMPLANT_DT: NORMAL
MDC_IDC_LEAD_LOCATION: NORMAL
MDC_IDC_LEAD_LOCATION: NORMAL
MDC_IDC_LEAD_LOCATION_DETAIL_1: NORMAL
MDC_IDC_LEAD_LOCATION_DETAIL_1: NORMAL
MDC_IDC_LEAD_MFG: NORMAL
MDC_IDC_LEAD_MFG: NORMAL
MDC_IDC_LEAD_MODEL: NORMAL
MDC_IDC_LEAD_MODEL: NORMAL
MDC_IDC_LEAD_POLARITY_TYPE: NORMAL
MDC_IDC_LEAD_POLARITY_TYPE: NORMAL
MDC_IDC_LEAD_SERIAL: NORMAL
MDC_IDC_LEAD_SERIAL: NORMAL
MDC_IDC_MSMT_BATTERY_STATUS: NORMAL
MDC_IDC_MSMT_LEADCHNL_RA_IMPEDANCE_VALUE: 700 OHM
MDC_IDC_MSMT_LEADCHNL_RA_PACING_THRESHOLD_AMPLITUDE: 0.8 V
MDC_IDC_MSMT_LEADCHNL_RA_PACING_THRESHOLD_PULSEWIDTH: 0.5 MS
MDC_IDC_MSMT_LEADCHNL_RA_SENSING_INTR_AMPL: 3 MV
MDC_IDC_MSMT_LEADCHNL_RV_IMPEDANCE_VALUE: 730 OHM
MDC_IDC_MSMT_LEADCHNL_RV_PACING_THRESHOLD_AMPLITUDE: 0.9 V
MDC_IDC_MSMT_LEADCHNL_RV_PACING_THRESHOLD_PULSEWIDTH: 0.4 MS
MDC_IDC_PG_IMPLANT_DTM: NORMAL
MDC_IDC_PG_MFG: NORMAL
MDC_IDC_PG_MODEL: NORMAL
MDC_IDC_PG_SERIAL: NORMAL
MDC_IDC_PG_TYPE: NORMAL
MDC_IDC_SESS_CLINIC_NAME: NORMAL
MDC_IDC_SESS_DTM: NORMAL
MDC_IDC_SESS_TYPE: NORMAL
MDC_IDC_SET_BRADY_AT_MODE_SWITCH_MODE: NORMAL
MDC_IDC_SET_BRADY_AT_MODE_SWITCH_RATE: 170 {BEATS}/MIN
MDC_IDC_SET_BRADY_HYSTRATE: NORMAL
MDC_IDC_SET_BRADY_LOWRATE: 60 {BEATS}/MIN
MDC_IDC_SET_BRADY_MAX_SENSOR_RATE: 130 {BEATS}/MIN
MDC_IDC_SET_BRADY_MAX_TRACKING_RATE: 130 {BEATS}/MIN
MDC_IDC_SET_BRADY_MODE: NORMAL
MDC_IDC_SET_BRADY_PAV_DELAY_HIGH: 180 MS
MDC_IDC_SET_BRADY_PAV_DELAY_LOW: 250 MS
MDC_IDC_SET_BRADY_SAV_DELAY_HIGH: 180 MS
MDC_IDC_SET_BRADY_SAV_DELAY_LOW: 250 MS
MDC_IDC_SET_LEADCHNL_RA_PACING_AMPLITUDE: 2 V
MDC_IDC_SET_LEADCHNL_RA_PACING_CAPTURE_MODE: NORMAL
MDC_IDC_SET_LEADCHNL_RA_PACING_POLARITY: NORMAL
MDC_IDC_SET_LEADCHNL_RA_PACING_PULSEWIDTH: 0.5 MS
MDC_IDC_SET_LEADCHNL_RA_SENSING_ADAPTATION_MODE: NORMAL
MDC_IDC_SET_LEADCHNL_RA_SENSING_POLARITY: NORMAL
MDC_IDC_SET_LEADCHNL_RA_SENSING_SENSITIVITY: 0.5 MV
MDC_IDC_SET_LEADCHNL_RV_PACING_AMPLITUDE: 1.3 V
MDC_IDC_SET_LEADCHNL_RV_PACING_CAPTURE_MODE: NORMAL
MDC_IDC_SET_LEADCHNL_RV_PACING_POLARITY: NORMAL
MDC_IDC_SET_LEADCHNL_RV_PACING_PULSEWIDTH: 0.4 MS
MDC_IDC_SET_LEADCHNL_RV_SENSING_ADAPTATION_MODE: NORMAL
MDC_IDC_SET_LEADCHNL_RV_SENSING_POLARITY: NORMAL
MDC_IDC_SET_LEADCHNL_RV_SENSING_SENSITIVITY: 2.5 MV
MDC_IDC_STAT_AT_DTM_END: NORMAL
MDC_IDC_STAT_AT_DTM_START: NORMAL
MDC_IDC_STAT_AT_MODE_SW_COUNT: 0
MDC_IDC_STAT_AT_MODE_SW_MAX_DURATION: 0 S
MDC_IDC_STAT_BRADY_DTM_END: NORMAL
MDC_IDC_STAT_BRADY_DTM_START: NORMAL
MDC_IDC_STAT_BRADY_RA_PERCENT_PACED: 18 %
MDC_IDC_STAT_BRADY_RV_PERCENT_PACED: 100 %
MDC_IDC_STAT_EPISODE_RECENT_COUNT: 0
MDC_IDC_STAT_EPISODE_RECENT_COUNT_DTM_END: NORMAL
MDC_IDC_STAT_EPISODE_RECENT_COUNT_DTM_START: NORMAL
MDC_IDC_STAT_EPISODE_TYPE: NORMAL

## 2020-10-16 NOTE — PROGRESS NOTES
"HPI:   I had the pleasure of seeing Steven when he came for follow up of PPM implant.  This 69 year old sees Dr. Cervantes for his history of:    1. Advanced HB with syncope 2011 - s/p [employer] PPM 4/2011  2. HTN  3. HL   4. Parkinson's (sees Dr. Fuller @ [facility], last visit 11/2019)    Dr. Cervantes saw Steven 9/2019 at which time he was doing well. Annual follow-up with med rec'd    Interval History:  Overall Steven is doing well from a cardiac perspective. No c/o CP, SOB, or pacer issues. He admits he's been \"pretty lazy\" and has gained ~5# of \"COVID weight.\" No c/o palpitations, dizziness, lightheadedness or falls/faints.  He's pretty steady on his feet despite the Parkinson's and doesn't use a walker/cane.    Notes a \"sock line\" but no sig edema. No orthopnea/PND      Recent Diagnostic Testing:  EKG today, which I overread, showed ASVP 88 bpm. Long AV delay  Device interrogation 9/2020 with 18% AP and 100%  in DDDR 60/130. Good variability  Echocardiogam 9/2019 showed EF 50-55%. Mild LVH. Nl RV. 1+ TR. RVSP wnl 21mmHg. Borderline dilated root and ascending aorta    Assessment & Plan:    1. H/o Heart Block with syncope    No recurrence s/p PPM 2011    PLAN:    Will align in-person PPM check with annual appt, 9/2021      2. HTN    On amlodipine 5 mg, Toprol XL 50 mg, and lisinopril/hydrochlorothiazide 10/12.5    BPs look good - he's due for refills    PLAN:    Refilled all meds    Encouraged exercise      Oumou Casarez PA-C, MSPAS      Orders Placed This Encounter   Procedures     Follow-Up with Cardiac Advanced Practice Provider     EKG 12-lead complete w/read - Clinics (performed today)     Orders Placed This Encounter   Medications     metoprolol succinate ER (TOPROL XL) 50 MG 24 hr tablet     Sig: Take 1 tablet (50 mg) by mouth daily     Dispense:  90 tablet     Refill:  3     amLODIPine (NORVASC) 5 MG tablet     Sig: Take 1 tablet (5 mg) by mouth daily     Dispense:  90 tablet     Refill:  3     Keep on " file until pt is due     lisinopril-hydrochlorothiazide (ZESTORETIC) 10-12.5 MG tablet     Sig: Take 1 tablet by mouth daily     Dispense:  90 tablet     Refill:  3     Keep on file until pt is due     Medications Discontinued During This Encounter   Medication Reason     lisinopril-hydrochlorothiazide (PRINZIDE/ZESTORETIC) 10-12.5 MG tablet Reorder     metoprolol succinate ER (TOPROL XL) 50 MG 24 hr tablet Reorder     amLODIPine (NORVASC) 5 MG tablet Reorder     HYDROcodone-acetaminophen (NORCO) 5-325 MG per tablet Stopped by Patient         Encounter Diagnoses   Name Primary?     Cardiac pacemaker in situ      Atrioventricular block, complete (H) Yes     Essential hypertension with goal blood pressure less than 140/90        CURRENT MEDICATIONS:  Current Outpatient Medications   Medication Sig Dispense Refill     amLODIPine (NORVASC) 5 MG tablet Take 1 tablet (5 mg) by mouth daily 90 tablet 3     aspirin 81 MG tablet Take 81 mg by mouth daily       carbidopa-levodopa (SINEMET)  MG per tablet Take 1 tablet by mouth 4 times daily       lisinopril-hydrochlorothiazide (ZESTORETIC) 10-12.5 MG tablet Take 1 tablet by mouth daily 90 tablet 3     metoprolol succinate ER (TOPROL XL) 50 MG 24 hr tablet Take 1 tablet (50 mg) by mouth daily 90 tablet 3     ondansetron (ZOFRAN-ODT) 4 MG ODT tab Take 1 tablet (4 mg) by mouth every 6 hours as needed for nausea 5 tablet 0     ROPINIROLE HCL PO Take by mouth 3 times daily         ALLERGIES     Allergies   Allergen Reactions     Hmg-Coa-R Inhibitors      Penicillamine Rash       PAST MEDICAL HISTORY:  Past Medical History:   Diagnosis Date     Complete AV block (H)     [employer] dual chamber 4/1/2011     Hyperlipidemia      Hypertension      Parkinson disease (H)      Syncope        PAST SURGICAL HISTORY:  Past Surgical History:   Procedure Laterality Date     IMPLANT PACEMAKER         FAMILY HISTORY:  Family History   Problem Relation Age of Onset     C.A.D. Mother       JORI.A.DCitlaly Father        SOCIAL HISTORY:  Social History     Socioeconomic History     Marital status:      Spouse name: None     Number of children: None     Years of education: None     Highest education level: None   Occupational History     None   Social Needs     Financial resource strain: None     Food insecurity     Worry: None     Inability: None     Transportation needs     Medical: None     Non-medical: None   Tobacco Use     Smoking status: Never Smoker     Smokeless tobacco: Never Used   Substance and Sexual Activity     Alcohol use: Yes     Comment: glass of wine occ     Drug use: None     Sexual activity: None   Lifestyle     Physical activity     Days per week: None     Minutes per session: None     Stress: None   Relationships     Social connections     Talks on phone: None     Gets together: None     Attends Christianity service: None     Active member of club or organization: None     Attends meetings of clubs or organizations: None     Relationship status: None     Intimate partner violence     Fear of current or ex partner: None     Emotionally abused: None     Physically abused: None     Forced sexual activity: None   Other Topics Concern     Parent/sibling w/ CABG, MI or angioplasty before 65F 55M? Not Asked      Service Not Asked     Blood Transfusions Not Asked     Caffeine Concern Not Asked     Occupational Exposure Not Asked     Hobby Hazards Not Asked     Sleep Concern Not Asked     Stress Concern Not Asked     Weight Concern Not Asked     Special Diet No     Back Care Not Asked     Exercise Yes     Comment: walking almost everyday     Bike Helmet Not Asked     Seat Belt Not Asked     Self-Exams Not Asked   Social History Narrative     None       Review of Systems:  Skin:  Negative     Eyes:  Positive for contacts  ENT:  Negative    Respiratory:  Negative for shortness of breath;dyspnea on exertion;cough  Cardiovascular:  Negative for;palpitations;chest  "pain;dizziness;lightheadedness;fatigue Positive for;edema  Gastroenterology: Negative for melena;hematochezia  Genitourinary:  Negative    Musculoskeletal:  Negative    Neurologic:  Positive for    Psychiatric:  Negative    Heme/Lymph/Imm:  Negative    Endocrine:  Negative      Physical Exam:  Vitals: /79   Pulse 96   Ht 1.854 m (6' 1\")   Wt 89.8 kg (198 lb)   BMI 26.12 kg/m      Constitutional:  cooperative, alert and oriented, well developed, well nourished, in no acute distress        Skin:  warm and dry to the touch, no apparent skin lesions or masses noted        Head:  normocephalic, no masses or lesions        Eyes:  sclera white;no xanthalasma;conjunctivae and lids unremarkable;pupils equal and round        ENT:  no pallor or cyanosis, dentition good        Neck:  JVP normal;no carotid bruit        Chest:  normal breath sounds, clear to auscultation, normal A-P diameter, normal symmetry, normal respiratory excursion, no use of accessory muscles        Cardiac: regular rhythm, normal S1/S2, no S3 or S4, apical impulse not displaced, no murmurs, gallops or rubs                  Abdomen:  abdomen soft        Vascular: pulses full and equal                                      Extremities and Back:  no deformities, clubbing, cyanosis, erythema observed        Neurological:    tremor      Recent Lab Results:  LIPID RESULTS:  No results found for: CHOL, HDL, LDL, TRIG, CHOLHDLRATIO    LIVER ENZYME RESULTS:  Lab Results   Component Value Date    AST 26 04/01/2011    ALT 9 04/01/2011       CBC RESULTS:  Lab Results   Component Value Date    WBC 8.8 03/15/2018    RBC 5.31 03/15/2018    HGB 16.4 03/15/2018    HCT 48.6 03/15/2018    MCV 92 03/15/2018    MCH 30.9 03/15/2018    MCHC 33.7 03/15/2018    RDW 12.8 03/15/2018     (L) 03/15/2018       BMP RESULTS:  Lab Results   Component Value Date     03/15/2018    POTASSIUM 3.8 03/15/2018    CHLORIDE 106 03/15/2018    CO2 28 03/15/2018    ANIONGAP 6 " 03/15/2018     (H) 03/15/2018    BUN 26 03/15/2018    CR 1.39 (H) 03/15/2018    GFRESTIMATED 51 (L) 03/15/2018    GFRESTBLACK 61 03/15/2018    ANGIE 8.5 03/15/2018

## 2020-10-20 ENCOUNTER — OFFICE VISIT (OUTPATIENT)
Dept: CARDIOLOGY | Facility: CLINIC | Age: 70
End: 2020-10-20
Attending: INTERNAL MEDICINE
Payer: COMMERCIAL

## 2020-10-20 VITALS
HEART RATE: 96 BPM | BODY MASS INDEX: 26.24 KG/M2 | HEIGHT: 73 IN | SYSTOLIC BLOOD PRESSURE: 128 MMHG | WEIGHT: 198 LBS | DIASTOLIC BLOOD PRESSURE: 79 MMHG

## 2020-10-20 DIAGNOSIS — Z95.0 CARDIAC PACEMAKER IN SITU: ICD-10-CM

## 2020-10-20 DIAGNOSIS — I10 ESSENTIAL HYPERTENSION WITH GOAL BLOOD PRESSURE LESS THAN 140/90: ICD-10-CM

## 2020-10-20 DIAGNOSIS — I44.2 ATRIOVENTRICULAR BLOCK, COMPLETE (H): Primary | ICD-10-CM

## 2020-10-20 PROCEDURE — 99214 OFFICE O/P EST MOD 30 MIN: CPT | Performed by: PHYSICIAN ASSISTANT

## 2020-10-20 PROCEDURE — 93000 ELECTROCARDIOGRAM COMPLETE: CPT | Performed by: PHYSICIAN ASSISTANT

## 2020-10-20 RX ORDER — LISINOPRIL/HYDROCHLOROTHIAZIDE 10-12.5 MG
1 TABLET ORAL DAILY
Qty: 90 TABLET | Refills: 3 | Status: SHIPPED | OUTPATIENT
Start: 2020-10-20 | End: 2021-09-24

## 2020-10-20 RX ORDER — METOPROLOL SUCCINATE 50 MG/1
50 TABLET, EXTENDED RELEASE ORAL DAILY
Qty: 90 TABLET | Refills: 3 | Status: SHIPPED | OUTPATIENT
Start: 2020-10-20 | End: 2021-09-24

## 2020-10-20 RX ORDER — AMLODIPINE BESYLATE 5 MG/1
5 TABLET ORAL DAILY
Qty: 90 TABLET | Refills: 3 | Status: SHIPPED | OUTPATIENT
Start: 2020-10-20 | End: 2021-09-24

## 2020-10-20 ASSESSMENT — MIFFLIN-ST. JEOR: SCORE: 1712

## 2020-10-20 NOTE — LETTER
"10/20/2020    Physician No Ref-Primary  No address on file    RE: Steven Dye       Dear Colleague,    I had the pleasure of seeing Steven Dye in the [facility].    HPI:   I had the pleasure of seeing Steven when he came for follow up of PPM implant.  This 69 year old sees Dr. Cervantes for his history of:    1. Advanced HB with syncope 2011 - s/p [employer] PPM 4/2011  2. HTN  3. HL   4. Parkinson's (sees Dr. Fuller @ [facility], last visit 11/2019)    Dr. Cervantes saw Steven 9/2019 at which time he was doing well. Annual follow-up with med rec'd    Interval History:  Overall Steven is doing well from a cardiac perspective. No c/o CP, SOB, or pacer issues. He admits he's been \"pretty lazy\" and has gained ~5# of \"COVID weight.\" No c/o palpitations, dizziness, lightheadedness or falls/faints.  He's pretty steady on his feet despite the Parkinson's and doesn't use a walker/cane.    Notes a \"sock line\" but no sig edema. No orthopnea/PND      Recent Diagnostic Testing:  EKG today, which I overread, showed ASVP 88 bpm. Long AV delay  Device interrogation 9/2020 with 18% AP and 100%  in DDDR 60/130. Good variability  Echocardiogam 9/2019 showed EF 50-55%. Mild LVH. Nl RV. 1+ TR. RVSP wnl 21mmHg. Borderline dilated root and ascending aorta    Assessment & Plan:    1. H/o Heart Block with syncope    No recurrence s/p PPM 2011    PLAN:    Will align in-person PPM check with annual appt, 9/2021      2. HTN    On amlodipine 5 mg, Toprol XL 50 mg, and lisinopril/hydrochlorothiazide 10/12.5    BPs look good - he's due for refills    PLAN:    Refilled all meds    Encouraged exercise      Oumou Casarez PA-C, MSPAS      Orders Placed This Encounter   Procedures     Follow-Up with Cardiac Advanced Practice Provider     EKG 12-lead complete w/read - Clinics (performed today)     Orders Placed This Encounter   Medications     metoprolol succinate ER (TOPROL XL) 50 MG 24 hr tablet     " Sig: Take 1 tablet (50 mg) by mouth daily     Dispense:  90 tablet     Refill:  3     amLODIPine (NORVASC) 5 MG tablet     Sig: Take 1 tablet (5 mg) by mouth daily     Dispense:  90 tablet     Refill:  3     Keep on file until pt is due     lisinopril-hydrochlorothiazide (ZESTORETIC) 10-12.5 MG tablet     Sig: Take 1 tablet by mouth daily     Dispense:  90 tablet     Refill:  3     Keep on file until pt is due     Medications Discontinued During This Encounter   Medication Reason     lisinopril-hydrochlorothiazide (PRINZIDE/ZESTORETIC) 10-12.5 MG tablet Reorder     metoprolol succinate ER (TOPROL XL) 50 MG 24 hr tablet Reorder     amLODIPine (NORVASC) 5 MG tablet Reorder     HYDROcodone-acetaminophen (NORCO) 5-325 MG per tablet Stopped by Patient         Encounter Diagnoses   Name Primary?     Cardiac pacemaker in situ      Atrioventricular block, complete (H) Yes     Essential hypertension with goal blood pressure less than 140/90        CURRENT MEDICATIONS:  Current Outpatient Medications   Medication Sig Dispense Refill     amLODIPine (NORVASC) 5 MG tablet Take 1 tablet (5 mg) by mouth daily 90 tablet 3     aspirin 81 MG tablet Take 81 mg by mouth daily       carbidopa-levodopa (SINEMET)  MG per tablet Take 1 tablet by mouth 4 times daily       lisinopril-hydrochlorothiazide (ZESTORETIC) 10-12.5 MG tablet Take 1 tablet by mouth daily 90 tablet 3     metoprolol succinate ER (TOPROL XL) 50 MG 24 hr tablet Take 1 tablet (50 mg) by mouth daily 90 tablet 3     ondansetron (ZOFRAN-ODT) 4 MG ODT tab Take 1 tablet (4 mg) by mouth every 6 hours as needed for nausea 5 tablet 0     ROPINIROLE HCL PO Take by mouth 3 times daily         ALLERGIES     Allergies   Allergen Reactions     Hmg-Coa-R Inhibitors      Penicillamine Rash       PAST MEDICAL HISTORY:  Past Medical History:   Diagnosis Date     Complete AV block (H)     [employer] dual chamber 4/1/2011     Hyperlipidemia      Hypertension      Parkinson  disease (H)      Syncope        PAST SURGICAL HISTORY:  Past Surgical History:   Procedure Laterality Date     IMPLANT PACEMAKER         FAMILY HISTORY:  Family History   Problem Relation Age of Onset     C.A.D. Mother      C.A.D. Father        SOCIAL HISTORY:  Social History     Socioeconomic History     Marital status:      Spouse name: None     Number of children: None     Years of education: None     Highest education level: None   Occupational History     None   Social Needs     Financial resource strain: None     Food insecurity     Worry: None     Inability: None     Transportation needs     Medical: None     Non-medical: None   Tobacco Use     Smoking status: Never Smoker     Smokeless tobacco: Never Used   Substance and Sexual Activity     Alcohol use: Yes     Comment: glass of wine occ     Drug use: None     Sexual activity: None   Lifestyle     Physical activity     Days per week: None     Minutes per session: None     Stress: None   Relationships     Social connections     Talks on phone: None     Gets together: None     Attends Jainism service: None     Active member of club or organization: None     Attends meetings of clubs or organizations: None     Relationship status: None     Intimate partner violence     Fear of current or ex partner: None     Emotionally abused: None     Physically abused: None     Forced sexual activity: None   Other Topics Concern     Parent/sibling w/ CABG, MI or angioplasty before 65F 55M? Not Asked      Service Not Asked     Blood Transfusions Not Asked     Caffeine Concern Not Asked     Occupational Exposure Not Asked     Hobby Hazards Not Asked     Sleep Concern Not Asked     Stress Concern Not Asked     Weight Concern Not Asked     Special Diet No     Back Care Not Asked     Exercise Yes     Comment: walking almost everyday     Bike Helmet Not Asked     Seat Belt Not Asked     Self-Exams Not Asked   Social History Narrative     None       Review of  "Systems:  Skin:  Negative     Eyes:  Positive for contacts  ENT:  Negative    Respiratory:  Negative for shortness of breath;dyspnea on exertion;cough  Cardiovascular:  Negative for;palpitations;chest pain;dizziness;lightheadedness;fatigue Positive for;edema  Gastroenterology: Negative for melena;hematochezia  Genitourinary:  Negative    Musculoskeletal:  Negative    Neurologic:  Positive for    Psychiatric:  Negative    Heme/Lymph/Imm:  Negative    Endocrine:  Negative      Physical Exam:  Vitals: /79   Pulse 96   Ht 1.854 m (6' 1\")   Wt 89.8 kg (198 lb)   BMI 26.12 kg/m      Constitutional:  cooperative, alert and oriented, well developed, well nourished, in no acute distress        Skin:  warm and dry to the touch, no apparent skin lesions or masses noted        Head:  normocephalic, no masses or lesions        Eyes:  sclera white;no xanthalasma;conjunctivae and lids unremarkable;pupils equal and round        ENT:  no pallor or cyanosis, dentition good        Neck:  JVP normal;no carotid bruit        Chest:  normal breath sounds, clear to auscultation, normal A-P diameter, normal symmetry, normal respiratory excursion, no use of accessory muscles        Cardiac: regular rhythm, normal S1/S2, no S3 or S4, apical impulse not displaced, no murmurs, gallops or rubs                  Abdomen:  abdomen soft        Vascular: pulses full and equal                                      Extremities and Back:  no deformities, clubbing, cyanosis, erythema observed        Neurological:    tremor      Recent Lab Results:  LIPID RESULTS:  No results found for: CHOL, HDL, LDL, TRIG, CHOLHDLRATIO    LIVER ENZYME RESULTS:  Lab Results   Component Value Date    AST 26 04/01/2011    ALT 9 04/01/2011       CBC RESULTS:  Lab Results   Component Value Date    WBC 8.8 03/15/2018    RBC 5.31 03/15/2018    HGB 16.4 03/15/2018    HCT 48.6 03/15/2018    MCV 92 03/15/2018    MCH 30.9 03/15/2018    MCHC 33.7 03/15/2018    RDW 12.8 " 03/15/2018     (L) 03/15/2018       BMP RESULTS:  Lab Results   Component Value Date     03/15/2018    POTASSIUM 3.8 03/15/2018    CHLORIDE 106 03/15/2018    CO2 28 03/15/2018    ANIONGAP 6 03/15/2018     (H) 03/15/2018    BUN 26 03/15/2018    CR 1.39 (H) 03/15/2018    GFRESTIMATED 51 (L) 03/15/2018    GFRESTBLACK 61 03/15/2018    ANGIE 8.5 03/15/2018                      Thank you for allowing me to participate in the care of your patient.      Sincerely,     Janki Casarez PA-C     [facility]    cc:   Edin Cervantes MD  [address]  [address]  [place] [id number]

## 2020-12-10 ENCOUNTER — ANCILLARY PROCEDURE (OUTPATIENT)
Dept: CARDIOLOGY | Facility: CLINIC | Age: 70
End: 2020-12-10
Attending: INTERNAL MEDICINE
Payer: COMMERCIAL

## 2020-12-10 DIAGNOSIS — I44.2 ATRIOVENTRICULAR BLOCK, COMPLETE (H): ICD-10-CM

## 2020-12-10 DIAGNOSIS — Z95.0 CARDIAC PACEMAKER IN SITU: ICD-10-CM

## 2020-12-10 DIAGNOSIS — I49.5 SICK SINUS SYNDROME (H): Primary | ICD-10-CM

## 2020-12-10 PROCEDURE — 93293 PM PHONE R-STRIP DEVICE EVAL: CPT | Performed by: INTERNAL MEDICINE

## 2021-03-01 ENCOUNTER — IMMUNIZATION (OUTPATIENT)
Dept: NURSING | Facility: CLINIC | Age: 71
End: 2021-03-01
Payer: COMMERCIAL

## 2021-03-01 PROCEDURE — 0011A PR COVID VAC MODERNA 100 MCG/0.5 ML IM: CPT

## 2021-03-01 PROCEDURE — 91301 PR COVID VAC MODERNA 100 MCG/0.5 ML IM: CPT

## 2021-03-18 ENCOUNTER — ANCILLARY PROCEDURE (OUTPATIENT)
Dept: CARDIOLOGY | Facility: CLINIC | Age: 71
End: 2021-03-18
Attending: INTERNAL MEDICINE
Payer: COMMERCIAL

## 2021-03-18 DIAGNOSIS — I49.5 SICK SINUS SYNDROME (H): ICD-10-CM

## 2021-03-18 DIAGNOSIS — Z95.0 CARDIAC PACEMAKER IN SITU: ICD-10-CM

## 2021-03-18 DIAGNOSIS — I44.2 ATRIOVENTRICULAR BLOCK, COMPLETE (H): Primary | ICD-10-CM

## 2021-03-18 PROCEDURE — 93280 PM DEVICE PROGR EVAL DUAL: CPT | Performed by: INTERNAL MEDICINE

## 2021-03-23 LAB
MDC_IDC_LEAD_IMPLANT_DT: NORMAL
MDC_IDC_LEAD_IMPLANT_DT: NORMAL
MDC_IDC_LEAD_LOCATION: NORMAL
MDC_IDC_LEAD_LOCATION: NORMAL
MDC_IDC_LEAD_LOCATION_DETAIL_1: NORMAL
MDC_IDC_LEAD_LOCATION_DETAIL_1: NORMAL
MDC_IDC_LEAD_MFG: NORMAL
MDC_IDC_LEAD_MFG: NORMAL
MDC_IDC_LEAD_MODEL: NORMAL
MDC_IDC_LEAD_MODEL: NORMAL
MDC_IDC_LEAD_POLARITY_TYPE: NORMAL
MDC_IDC_LEAD_POLARITY_TYPE: NORMAL
MDC_IDC_LEAD_SERIAL: NORMAL
MDC_IDC_LEAD_SERIAL: NORMAL
MDC_IDC_MSMT_BATTERY_STATUS: NORMAL
MDC_IDC_MSMT_LEADCHNL_RA_IMPEDANCE_VALUE: 700 OHM
MDC_IDC_MSMT_LEADCHNL_RA_PACING_THRESHOLD_AMPLITUDE: 0.8 V
MDC_IDC_MSMT_LEADCHNL_RA_PACING_THRESHOLD_PULSEWIDTH: 0.5 MS
MDC_IDC_MSMT_LEADCHNL_RV_IMPEDANCE_VALUE: 820 OHM
MDC_IDC_MSMT_LEADCHNL_RV_PACING_THRESHOLD_AMPLITUDE: 0.9 V
MDC_IDC_MSMT_LEADCHNL_RV_PACING_THRESHOLD_PULSEWIDTH: 0.4 MS
MDC_IDC_PG_IMPLANT_DTM: NORMAL
MDC_IDC_PG_MFG: NORMAL
MDC_IDC_PG_MODEL: NORMAL
MDC_IDC_PG_SERIAL: NORMAL
MDC_IDC_PG_TYPE: NORMAL
MDC_IDC_SESS_CLINIC_NAME: NORMAL
MDC_IDC_SESS_DTM: NORMAL
MDC_IDC_SESS_TYPE: NORMAL
MDC_IDC_SET_BRADY_AT_MODE_SWITCH_MODE: NORMAL
MDC_IDC_SET_BRADY_AT_MODE_SWITCH_RATE: 170 {BEATS}/MIN
MDC_IDC_SET_BRADY_HYSTRATE: NORMAL
MDC_IDC_SET_BRADY_LOWRATE: 60 {BEATS}/MIN
MDC_IDC_SET_BRADY_MAX_SENSOR_RATE: 130 {BEATS}/MIN
MDC_IDC_SET_BRADY_MAX_TRACKING_RATE: 130 {BEATS}/MIN
MDC_IDC_SET_BRADY_MODE: NORMAL
MDC_IDC_SET_BRADY_PAV_DELAY_HIGH: 180 MS
MDC_IDC_SET_BRADY_PAV_DELAY_LOW: 250 MS
MDC_IDC_SET_BRADY_SAV_DELAY_HIGH: 180 MS
MDC_IDC_SET_BRADY_SAV_DELAY_LOW: 250 MS
MDC_IDC_SET_LEADCHNL_RA_PACING_AMPLITUDE: 2 V
MDC_IDC_SET_LEADCHNL_RA_PACING_CAPTURE_MODE: NORMAL
MDC_IDC_SET_LEADCHNL_RA_PACING_POLARITY: NORMAL
MDC_IDC_SET_LEADCHNL_RA_PACING_PULSEWIDTH: 0.5 MS
MDC_IDC_SET_LEADCHNL_RA_SENSING_ADAPTATION_MODE: NORMAL
MDC_IDC_SET_LEADCHNL_RA_SENSING_POLARITY: NORMAL
MDC_IDC_SET_LEADCHNL_RA_SENSING_SENSITIVITY: 0.5 MV
MDC_IDC_SET_LEADCHNL_RV_PACING_AMPLITUDE: 1.2 V
MDC_IDC_SET_LEADCHNL_RV_PACING_CAPTURE_MODE: NORMAL
MDC_IDC_SET_LEADCHNL_RV_PACING_POLARITY: NORMAL
MDC_IDC_SET_LEADCHNL_RV_PACING_PULSEWIDTH: 0.4 MS
MDC_IDC_SET_LEADCHNL_RV_SENSING_ADAPTATION_MODE: NORMAL
MDC_IDC_SET_LEADCHNL_RV_SENSING_POLARITY: NORMAL
MDC_IDC_SET_LEADCHNL_RV_SENSING_SENSITIVITY: 2.5 MV
MDC_IDC_STAT_AT_DTM_END: NORMAL
MDC_IDC_STAT_AT_DTM_START: NORMAL
MDC_IDC_STAT_AT_MODE_SW_COUNT: 0
MDC_IDC_STAT_AT_MODE_SW_MAX_DURATION: 0 S
MDC_IDC_STAT_BRADY_DTM_END: NORMAL
MDC_IDC_STAT_BRADY_DTM_START: NORMAL
MDC_IDC_STAT_BRADY_RA_PERCENT_PACED: 16 %
MDC_IDC_STAT_BRADY_RV_PERCENT_PACED: 100 %
MDC_IDC_STAT_EPISODE_RECENT_COUNT: 0
MDC_IDC_STAT_EPISODE_RECENT_COUNT_DTM_END: NORMAL
MDC_IDC_STAT_EPISODE_RECENT_COUNT_DTM_START: NORMAL
MDC_IDC_STAT_EPISODE_TYPE: NORMAL

## 2021-03-29 ENCOUNTER — IMMUNIZATION (OUTPATIENT)
Dept: NURSING | Facility: CLINIC | Age: 71
End: 2021-03-29
Attending: INTERNAL MEDICINE
Payer: COMMERCIAL

## 2021-03-29 PROCEDURE — 0012A PR COVID VAC MODERNA 100 MCG/0.5 ML IM: CPT

## 2021-03-29 PROCEDURE — 91301 PR COVID VAC MODERNA 100 MCG/0.5 ML IM: CPT

## 2021-06-16 ENCOUNTER — OFFICE VISIT (OUTPATIENT)
Dept: FAMILY MEDICINE | Facility: CLINIC | Age: 71
End: 2021-06-16
Payer: COMMERCIAL

## 2021-06-16 VITALS
HEART RATE: 78 BPM | SYSTOLIC BLOOD PRESSURE: 122 MMHG | BODY MASS INDEX: 26.71 KG/M2 | TEMPERATURE: 97.8 F | OXYGEN SATURATION: 99 % | WEIGHT: 201.5 LBS | DIASTOLIC BLOOD PRESSURE: 77 MMHG | HEIGHT: 73 IN

## 2021-06-16 DIAGNOSIS — Z12.5 ENCOUNTER FOR SCREENING FOR MALIGNANT NEOPLASM OF PROSTATE: ICD-10-CM

## 2021-06-16 DIAGNOSIS — Z00.00 ENCOUNTER FOR PREVENTIVE HEALTH EXAMINATION: Primary | ICD-10-CM

## 2021-06-16 LAB
BASOPHILS # BLD AUTO: 0 10E9/L (ref 0–0.2)
BASOPHILS NFR BLD AUTO: 0.4 %
DIFFERENTIAL METHOD BLD: ABNORMAL
EOSINOPHIL # BLD AUTO: 0.1 10E9/L (ref 0–0.7)
EOSINOPHIL NFR BLD AUTO: 2.7 %
ERYTHROCYTE [DISTWIDTH] IN BLOOD BY AUTOMATED COUNT: 13.4 % (ref 10–15)
FOLATE SERPL-MCNC: 7.8 NG/ML
HCT VFR BLD AUTO: 44.8 % (ref 40–53)
HGB BLD-MCNC: 15.4 G/DL (ref 13.3–17.7)
LYMPHOCYTES # BLD AUTO: 1.6 10E9/L (ref 0.8–5.3)
LYMPHOCYTES NFR BLD AUTO: 31.1 %
MCH RBC QN AUTO: 30.9 PG (ref 26.5–33)
MCHC RBC AUTO-ENTMCNC: 34.4 G/DL (ref 31.5–36.5)
MCV RBC AUTO: 90 FL (ref 78–100)
MONOCYTES # BLD AUTO: 0.6 10E9/L (ref 0–1.3)
MONOCYTES NFR BLD AUTO: 10.8 %
NEUTROPHILS # BLD AUTO: 2.8 10E9/L (ref 1.6–8.3)
NEUTROPHILS NFR BLD AUTO: 55 %
PLATELET # BLD AUTO: 132 10E9/L (ref 150–450)
RBC # BLD AUTO: 4.99 10E12/L (ref 4.4–5.9)
VIT B12 SERPL-MCNC: 316 PG/ML (ref 193–986)
WBC # BLD AUTO: 5.2 10E9/L (ref 4–11)

## 2021-06-16 PROCEDURE — 99387 INIT PM E/M NEW PAT 65+ YRS: CPT | Performed by: INTERNAL MEDICINE

## 2021-06-16 PROCEDURE — 85025 COMPLETE CBC W/AUTO DIFF WBC: CPT | Performed by: INTERNAL MEDICINE

## 2021-06-16 PROCEDURE — 80053 COMPREHEN METABOLIC PANEL: CPT | Performed by: INTERNAL MEDICINE

## 2021-06-16 PROCEDURE — G0103 PSA SCREENING: HCPCS | Performed by: INTERNAL MEDICINE

## 2021-06-16 PROCEDURE — 84443 ASSAY THYROID STIM HORMONE: CPT | Performed by: INTERNAL MEDICINE

## 2021-06-16 PROCEDURE — 36415 COLL VENOUS BLD VENIPUNCTURE: CPT | Performed by: INTERNAL MEDICINE

## 2021-06-16 PROCEDURE — 82607 VITAMIN B-12: CPT | Performed by: INTERNAL MEDICINE

## 2021-06-16 PROCEDURE — 82746 ASSAY OF FOLIC ACID SERUM: CPT | Performed by: INTERNAL MEDICINE

## 2021-06-16 ASSESSMENT — MIFFLIN-ST. JEOR: SCORE: 1722.88

## 2021-06-16 NOTE — PROGRESS NOTES
"    Assessment & Plan     Encounter for preventive health examination  The usual labs will be obtained.  The patient has Parkinson's disease.  He does admit to some issues of truncal instability.  We discussed the importance of core strengthening.  - Comprehensive metabolic panel (BMP + Alb, Alk Phos, ALT, AST, Total. Bili, TP)  - CBC with platelets and differential  - TSH with free T4 reflex  - Vitamin B12  - Folate  - PSA, screen  - GASTROENTEROLOGY ADULT REF PROCEDURE ONLY; Future    Encounter for screening for malignant neoplasm of prostate   Patient with nocturia.  - PSA, screen         BMI:   Estimated body mass index is 26.58 kg/m  as calculated from the following:    Height as of this encounter: 1.854 m (6' 1\").    Weight as of this encounter: 91.4 kg (201 lb 8 oz).       See Patient Instructions    Return in about 6 months (around 12/16/2021) for Routine preventive, Follow up.    Alexandru Huggins MD  [facility] DOROTEO Harris is a 70 year old who presents for the following health issues     HPI 70-year-old with a history of Parkinson's disease as well as hyperlipidemia, a pacemaker for heart block, hypertension, and right bundle branch block.    He states that he is holding his own.  Does have a little bit more truncal instability.  The patient and I discussed exercises to strengthen his core.  We also discussed exercises to help with lower back discomfort.  These exercises were demonstrated to the patient while he was in clinic today.    Otherwise he denies chest pain or shortness of breath.  He does get some exercise.  Does have muscular rigidity related to his parkinsonism.  Patient denies any depressive symptoms.  No hopelessness or helplessness.  He continues to be very interested in science and genealogy.  He is a retired professor of zoology.    New Patient/Transfer of Care    New Patient/Transfer of Care    Review of Systems   Constitutional, HEENT, cardiovascular, " pulmonary, gi and gu systems are negative, except as otherwise noted.      Objective    There were no vitals taken for this visit.  There is no height or weight on file to calculate BMI.  Physical Exam   GENERAL: healthy, alert and no distress  EYES: Eyes grossly normal to inspection, PERRL and conjunctivae and sclerae normal  HENT: ear canals and TM's normal, nose and mouth without ulcers or lesions  NECK: no adenopathy, no asymmetry, masses, or scars and thyroid normal to palpation  RESP: lungs clear to auscultation - no rales, rhonchi or wheezes  CV: regular rate and rhythm, normal S1 S2, no S3 or S4, no murmur, click or rub, no peripheral edema and peripheral pulses strong  ABDOMEN: soft, nontender, no hepatosplenomegaly, no masses and bowel sounds normal  MS: no gross musculoskeletal defects noted, no edema  SKIN: no suspicious lesions or rashes  NEURO: Normal strength and tone, mentation intact and speech normal  PSYCH: mentation appears normal, affect normal/bright    Ancillary Procedure on 03/18/2021   Component Date Value Ref Range Status     Date Time Interrogation Session 03/18/2021 [id number]   Final     Implantable Pulse Generator [employer]* 03/18/2021 [employer]   Final     Implantable Pulse Generator Model 03/18/2021 S606 ALTRUA 60 EL   Final     Implantable Pulse Generator Serial* 03/18/2021 [id number]   Final     Type Interrogation Session 03/18/2021 In Clinic   Final     Clinic Name 03/18/2021 [place]   Final     Implantable Pulse Generator Type 03/18/2021 Pacemaker   Final     Implantable Pulse Generator Implan* 03/18/2021 [id number]   Final     Implantable Lead  03/18/2021 [employer]   Final     Implantable Lead Model 03/18/2021 4135 Dextrus   Final     Implantable Lead Serial Number 03/18/2021 [id number]   Final     Implantable Lead Implant Date 03/18/2021 20110401   Final     Implantable Lead Polarity Type 03/18/2021 Bipolar Lead   Final     Implantable Lead Location Detail 1  03/18/2021 APPENDAGE   Final     Implantable Lead Location 03/18/2021 Right Atrium   Final     Implantable Lead  03/18/2021 [employer]ant   Final     Implantable Lead Model 03/18/2021 4136 Ham   Final     Implantable Lead Serial Number 03/18/2021 [id number]   Final     Implantable Lead Implant Date 03/18/2021 20110401   Final     Implantable Lead Polarity Type 03/18/2021 Bipolar Lead   Final     Implantable Lead Location Detail 1 03/18/2021 APEX   Final     Implantable Lead Location 03/18/2021 Right Ventricle   Final     G[employer]omo Setting Mode (NBG Code) 03/18/2021 DDDR   Final     [employer] Setting Lower Rate Limit 03/18/2021 60  [beats]/min Final     [employer] Setting Maximum Tracking Rate 03/18/2021 130  [beats]/min Final     [employer] Setting Maximum Sensor Rate 03/18/2021 130  [beats]/min Final     [employer] Setting Hysterisis Rate 03/18/2021 Off   Final     [employer] Setting VIGNESH Delay Low 03/18/2021 250  ms Final     [employer] Setting PAV Delay Low 03/18/2021 250  ms Final     [employer] Setting PAV Delay High 03/18/2021 180  ms Final     [employer] Setting VIGNESH Delay High 03/18/2021 180  ms Final     [employer] Setting AT Mode Switch Rate 03/18/2021 170  [beats]/min Final     [employer] Setting AT Mode Switch Mode 03/18/2021 VDIR   Final     Lead Channel Setting Sensing Polar* 03/18/2021 Bipolar   Final     Lead Channel Setting Sensing Sensi* 03/18/2021 0.50  mV Final     Lead Channel Setting Sensing Adapt* 03/18/2021 Fixed    Final     Lead Channel Setting Sensing Polar* 03/18/2021 Bipolar   Final     Lead Channel Setting Sensing Sensi* 03/18/2021 2.5  mV Final     Lead Channel Setting Sensing Adapt* 03/18/2021 Fixed    Final     Lead Channel Setting Pacing Polari* 03/18/2021 Bipolar   Final     Lead Channel Setting Pacing Pulse * 03/18/2021 0.50  ms Final     Lead Channel Setting Pacing Amplit* 03/18/2021 2.0  V Final     Lead Channel Setting Pacing Captur* 03/18/2021 Fixed Pacing   Final     Lead Channel Setting Pacing Polari* 03/18/2021 Bipolar    Final     Lead Channel Setting Pacing Pulse * 03/18/2021 0.40  ms Final     Lead Channel Setting Pacing Amplit* 03/18/2021 1.2  V Final     Lead Channel Setting Pacing Captur* 03/18/2021 Adaptive   Final     Lead Channel Impedance Value 03/18/2021 700  ohm Final     Lead Channel Pacing Threshold Ampl* 03/18/2021 0.8  V Final     Lead Channel Pacing Threshold Puls* 03/18/2021 0.50  ms Final     Lead Channel Impedance Value 03/18/2021 820  ohm Final     Lead Channel Pacing Threshold Ampl* 03/18/2021 0.9  V Final     Lead Channel Pacing Threshold Puls* 03/18/2021 0.40  ms Final     Battery Status 03/18/2021 Beginning of Service   Final     G[employer]omo Statistic Date Time Start 03/18/2021 20200910000000   Final     G[employer]omo Statistic Date Time End 03/18/2021 [id number]   Final     [employer] Statistic RA Percent Paced 03/18/2021 16  % Final     [employer] Statistic RV Percent Paced 03/18/2021 100  % Final     Atrial Tachy Statistic Date Time S* 03/18/2021 20200910000000   Final     Atrial Tachy Statistic Date Time E* 03/18/2021 [id number]   Final     Atrial Tachy Statistic Maximum Mod* 03/18/2021 0  s Final     Atrial Tachy Statistic Number Of M* 03/18/2021 0   Final     Episode Statistic Recent Count 03/18/2021 0   Final     Episode Statistic Type Category 03/18/2021 Other   Final     Episode Statistic Recent Date Time* 03/18/2021 [id number]   Final     Episode Statistic Recent Date Time* 03/18/2021 [id number]   Final

## 2021-06-16 NOTE — PATIENT INSTRUCTIONS
Mr. Dye;  Your blood pressure is superbly controlled on your current medications.  I would like to check a couple of tests of renal function and electrolytes to make certain there is no abnormality here.    Regarding your urine symptoms, I suspect they relate to benign prostatic hypertrophy.  There is a medication that can be helpful for this called Flomax.  I would like to evaluate a serum PSA level prior to this.    Otherwise, in terms of screening, a colonoscopy is recommended along with other labs including cholesterol.    Best regards  Alexandru

## 2021-06-17 LAB
ALBUMIN SERPL-MCNC: 3.7 G/DL (ref 3.4–5)
ALP SERPL-CCNC: 61 U/L (ref 40–150)
ALT SERPL W P-5'-P-CCNC: 11 U/L (ref 0–70)
ANION GAP SERPL CALCULATED.3IONS-SCNC: 3 MMOL/L (ref 3–14)
AST SERPL W P-5'-P-CCNC: 15 U/L (ref 0–45)
BILIRUB SERPL-MCNC: 0.5 MG/DL (ref 0.2–1.3)
BUN SERPL-MCNC: 25 MG/DL (ref 7–30)
CALCIUM SERPL-MCNC: 8.8 MG/DL (ref 8.5–10.1)
CHLORIDE SERPL-SCNC: 110 MMOL/L (ref 94–109)
CO2 SERPL-SCNC: 29 MMOL/L (ref 20–32)
CREAT SERPL-MCNC: 0.92 MG/DL (ref 0.66–1.25)
GFR SERPL CREATININE-BSD FRML MDRD: 83 ML/MIN/{1.73_M2}
GLUCOSE SERPL-MCNC: 96 MG/DL (ref 70–99)
POTASSIUM SERPL-SCNC: 4 MMOL/L (ref 3.4–5.3)
PROT SERPL-MCNC: 6.8 G/DL (ref 6.8–8.8)
PSA SERPL-ACNC: 1.99 UG/L (ref 0–4)
SODIUM SERPL-SCNC: 142 MMOL/L (ref 133–144)
TSH SERPL DL<=0.005 MIU/L-ACNC: 1.06 MU/L (ref 0.4–4)

## 2021-06-18 ENCOUNTER — ANCILLARY PROCEDURE (OUTPATIENT)
Dept: CARDIOLOGY | Facility: CLINIC | Age: 71
End: 2021-06-18
Attending: INTERNAL MEDICINE
Payer: COMMERCIAL

## 2021-06-18 DIAGNOSIS — I44.2 ATRIOVENTRICULAR BLOCK, COMPLETE (H): ICD-10-CM

## 2021-06-18 DIAGNOSIS — Z95.0 CARDIAC PACEMAKER IN SITU: ICD-10-CM

## 2021-06-18 PROCEDURE — 93293 PM PHONE R-STRIP DEVICE EVAL: CPT | Performed by: INTERNAL MEDICINE

## 2021-08-30 ENCOUNTER — TRANSCRIBE ORDERS (OUTPATIENT)
Dept: OTHER | Age: 71
End: 2021-08-30

## 2021-08-30 DIAGNOSIS — G20.A1 IDIOPATHIC PARKINSON'S DISEASE (H): Primary | ICD-10-CM

## 2021-09-13 ENCOUNTER — TRANSFERRED RECORDS (OUTPATIENT)
Dept: HEALTH INFORMATION MANAGEMENT | Facility: CLINIC | Age: 71
End: 2021-09-13

## 2021-09-14 ENCOUNTER — TRANSFERRED RECORDS (OUTPATIENT)
Dept: HEALTH INFORMATION MANAGEMENT | Facility: CLINIC | Age: 71
End: 2021-09-14
Payer: COMMERCIAL

## 2021-09-20 NOTE — PROGRESS NOTES
"[facility]    I had the pleasure of seeing Steven when he came for annual follow-up.  This 70 year old sees Dr. Cervantes for his history of:    1. Advanced HB with syncope 2011 - s/p [employer] PPM 4/2011  2. HTN  3. HL   4. Parkinson's -sees Dr. Fuller @ [facility], last visit 12/2020    I saw Steven 10/2020 at which time he was doing well from a CV/EP perspective. Annual follow-up rec'd to coincide with in-office PPM check.    Interval History:  [place] the mall 2-3x/week. No c/o CP/SOB with this. He's very careful not to fall; does not use any assistive devices.    No c/o palpitations, dizziness, lightheadedness. No orthostatic sxs. No CP, SOB. No orthopnea, PND.    About 1 month ago, had an episode of bilateral foot swelling. Nothing that he can think of triggered it - no changes to food/sodium/drinking/medications. It spontaneously resolved after ~2 weeks.      VITALS:  Vitals: /74 (BP Location: Left arm, Cuff Size: Adult Regular)   Pulse 61   Ht 1.854 m (6' 1\")   Wt 90.3 kg (199 lb)   SpO2 97%   BMI 26.25 kg/m      Diagnostic Testing:  EKG today, which I overread, showed ASVP  BS Device interrogation today, showed 21% AP and 100%  in DDDR 60/130. No atrial or ventricular arrhythmias. No escape @ VVI 30 with underlying rhythm SR 60s/CHB  Echocardiogam 9/2019 showed EF 50-55%. Mild LVH. Nl RV. 1+ TR. RVSP wnl 21mmHg. Borderline dilated root and ascending aorta  Component      Latest Ref Rng & Units 6/16/2021   Sodium      133 - 144 mmol/L 142   Potassium      3.4 - 5.3 mmol/L 4.0   Chloride      94 - 109 mmol/L 110 (H)   Carbon Dioxide      20 - 32 mmol/L 29   Anion Gap      3 - 14 mmol/L 3   Glucose      70 - 99 mg/dL 96   Urea Nitrogen      7 - 30 mg/dL 25   Creatinine      0.66 - 1.25 mg/dL 0.92   GFR Estimate      >60 mL/min/1.73:m2 83   GFR Estimate If Black      >60 mL/min/1.73:m2 >90   Calcium      8.5 - 10.1 mg/dL 8.8       Plan:  Annual follow-up with " PPM check    Assessment/Plan:    1. Advanced Heart Block/Syncope -     S/p PPM as above    PPM check looks good without arrhythmias    SUJATA in ~1.5 years    PLAN:    Continue current device schedule    Plan annual follow-up with in-office PPM check      2. HTN    BPs look good; no issues with orthostasis at this point    BMP from 6/2021 looks good    PLAN:    Continue current meds. Refills x 1 year sent      Oumou Casarez PA-C, MSPAS      Orders Placed This Encounter   Procedures     Follow-Up with Cardiac Advanced Practice Provider     EKG 12-lead complete w/read - Clinics (performed today)     Orders Placed This Encounter   Medications     amLODIPine (NORVASC) 5 MG tablet     Sig: Take 1 tablet (5 mg) by mouth daily     Dispense:  90 tablet     Refill:  3     lisinopril-hydrochlorothiazide (ZESTORETIC) 10-12.5 MG tablet     Sig: Take 1 tablet by mouth daily     Dispense:  90 tablet     Refill:  3     metoprolol succinate ER (TOPROL XL) 50 MG 24 hr tablet     Sig: Take 1 tablet (50 mg) by mouth daily     Dispense:  90 tablet     Refill:  3     Medications Discontinued During This Encounter   Medication Reason     ondansetron (ZOFRAN-ODT) 4 MG ODT tab Stopped by Patient     metoprolol succinate ER (TOPROL XL) 50 MG 24 hr tablet Reorder     amLODIPine (NORVASC) 5 MG tablet Reorder     lisinopril-hydrochlorothiazide (ZESTORETIC) 10-12.5 MG tablet Reorder         Encounter Diagnoses   Name Primary?     Cardiac pacemaker in situ Yes     Atrioventricular block, complete (H)      Essential hypertension with goal blood pressure less than 140/90        CURRENT MEDICATIONS:  Current Outpatient Medications   Medication Sig Dispense Refill     amLODIPine (NORVASC) 5 MG tablet Take 1 tablet (5 mg) by mouth daily 90 tablet 3     aspirin 81 MG tablet Take 81 mg by mouth daily       carbidopa-levodopa (SINEMET)  MG per tablet Take 1 tablet by mouth 4 times daily       lisinopril-hydrochlorothiazide (ZESTORETIC) 10-12.5 MG  "tablet Take 1 tablet by mouth daily 90 tablet 3     metoprolol succinate ER (TOPROL XL) 50 MG 24 hr tablet Take 1 tablet (50 mg) by mouth daily 90 tablet 3     ROPINIROLE HCL PO Take by mouth 3 times daily         ALLERGIES     Allergies   Allergen Reactions     Penicillins      hives     Statin Drugs [Hmg-Coa-R Inhibitors]      Penicillamine Rash         Review of Systems:  Skin:  Negative     Eyes:  Positive for contacts  ENT:  Negative    Respiratory:  Negative for shortness of breath;dyspnea on exertion;cough  Cardiovascular:  Negative for;palpitations;chest pain;dizziness;lightheadedness;fatigue;edema    Gastroenterology: Negative for melena;hematochezia  Genitourinary:  Negative    Musculoskeletal:  Negative    Neurologic:  Positive for involuntary movement;tremors  Psychiatric:  Negative    Heme/Lymph/Imm:  Negative    Endocrine:  Negative      Physical Exam:  Vitals: /74 (BP Location: Left arm, Cuff Size: Adult Regular)   Pulse 61   Ht 1.854 m (6' 1\")   Wt 90.3 kg (199 lb)   SpO2 97%   BMI 26.25 kg/m      Constitutional:  cooperative, alert and oriented, well developed, well nourished, in no acute distress        Skin:  warm and dry to the touch, no apparent skin lesions or masses noted        Head:  normocephalic, no masses or lesions        Eyes:  sclera white;no xanthalasma;conjunctivae and lids unremarkable;pupils equal and round        ENT:  not assessed this visit        Neck:  JVP normal;no carotid bruit        Chest:  normal breath sounds, clear to auscultation, normal A-P diameter, normal symmetry, normal respiratory excursion, no use of accessory muscles        Cardiac: regular rhythm, normal S1/S2, no S3 or S4, apical impulse not displaced, no murmurs, gallops or rubs                  Abdomen:  abdomen soft        Vascular: pulses full and equal                                      Extremities and Back:  no deformities, clubbing, cyanosis, erythema observed        Neurological:    " tremor          PAST MEDICAL HISTORY:  Past Medical History:   Diagnosis Date     Complete AV block (H)     [employer] dual chamber 4/1/2011     Hyperlipidemia      Hypertension      Parkinson disease (H)      Syncope        PAST SURGICAL HISTORY:  Past Surgical History:   Procedure Laterality Date     IMPLANT PACEMAKER         FAMILY HISTORY:  Family History   Problem Relation Age of Onset     C.A.D. Mother      C.A.D. Father        SOCIAL HISTORY:  Social History     Socioeconomic History     Marital status:      Spouse name: None     Number of children: None     Years of education: None     Highest education level: None   Occupational History     None   Tobacco Use     Smoking status: Never Smoker     Smokeless tobacco: Never Used   Substance and Sexual Activity     Alcohol use: Yes     Comment: glass of wine occ     Drug use: Not Currently     Sexual activity: None   Other Topics Concern     Parent/sibling w/ CABG, MI or angioplasty before 65F 55M? Not Asked      Service Not Asked     Blood Transfusions Not Asked     Caffeine Concern Not Asked     Occupational Exposure Not Asked     Hobby Hazards Not Asked     Sleep Concern Not Asked     Stress Concern Not Asked     Weight Concern Not Asked     Special Diet No     Back Care Not Asked     Exercise Yes     Comment: walking almost everyday     Bike Helmet Not Asked     Seat Belt Not Asked     Self-Exams Not Asked   Social History Narrative     None     Social Determinants of Health     Financial Resource Strain:      Difficulty of Paying Living Expenses:    Food Insecurity:      Worried About Running Out of Food in the Last Year:      Ran Out of Food in the Last Year:    Transportation Needs:      Lack of Transportation (Medical):      Lack of Transportation (Non-Medical):    Physical Activity:      Days of Exercise per Week:      Minutes of Exercise per Session:    Stress:      Feeling of Stress :    Social Connections:      Frequency of  Communication with Friends and Family:      Frequency of Social Gatherings with Friends and Family:      Attends Cheondoism Services:      Active Member of Clubs or Organizations:      Attends Club or Organization Meetings:      Marital Status:    Intimate Partner Violence:      Fear of Current or Ex-Partner:      Emotionally Abused:      Physically Abused:      Sexually Abused:

## 2021-09-24 ENCOUNTER — OFFICE VISIT (OUTPATIENT)
Dept: CARDIOLOGY | Facility: CLINIC | Age: 71
End: 2021-09-24
Attending: PHYSICIAN ASSISTANT
Payer: COMMERCIAL

## 2021-09-24 ENCOUNTER — ANCILLARY PROCEDURE (OUTPATIENT)
Dept: CARDIOLOGY | Facility: CLINIC | Age: 71
End: 2021-09-24
Attending: INTERNAL MEDICINE
Payer: COMMERCIAL

## 2021-09-24 VITALS
OXYGEN SATURATION: 97 % | WEIGHT: 199 LBS | DIASTOLIC BLOOD PRESSURE: 74 MMHG | HEIGHT: 73 IN | HEART RATE: 61 BPM | SYSTOLIC BLOOD PRESSURE: 113 MMHG | BODY MASS INDEX: 26.37 KG/M2

## 2021-09-24 DIAGNOSIS — Z95.0 CARDIAC PACEMAKER IN SITU: Primary | ICD-10-CM

## 2021-09-24 DIAGNOSIS — I44.2 ATRIOVENTRICULAR BLOCK, COMPLETE (H): ICD-10-CM

## 2021-09-24 DIAGNOSIS — I10 ESSENTIAL HYPERTENSION WITH GOAL BLOOD PRESSURE LESS THAN 140/90: ICD-10-CM

## 2021-09-24 DIAGNOSIS — Z95.0 CARDIAC PACEMAKER IN SITU: ICD-10-CM

## 2021-09-24 PROCEDURE — 93000 ELECTROCARDIOGRAM COMPLETE: CPT | Performed by: PHYSICIAN ASSISTANT

## 2021-09-24 PROCEDURE — 93280 PM DEVICE PROGR EVAL DUAL: CPT | Performed by: INTERNAL MEDICINE

## 2021-09-24 PROCEDURE — 99213 OFFICE O/P EST LOW 20 MIN: CPT | Performed by: PHYSICIAN ASSISTANT

## 2021-09-24 RX ORDER — METOPROLOL SUCCINATE 50 MG/1
50 TABLET, EXTENDED RELEASE ORAL DAILY
Qty: 90 TABLET | Refills: 3 | Status: SHIPPED | OUTPATIENT
Start: 2021-09-24 | End: 2022-10-07

## 2021-09-24 RX ORDER — LISINOPRIL/HYDROCHLOROTHIAZIDE 10-12.5 MG
1 TABLET ORAL DAILY
Qty: 90 TABLET | Refills: 3 | Status: SHIPPED | OUTPATIENT
Start: 2021-09-24 | End: 2022-10-07

## 2021-09-24 RX ORDER — AMLODIPINE BESYLATE 5 MG/1
5 TABLET ORAL DAILY
Qty: 90 TABLET | Refills: 3 | Status: SHIPPED | OUTPATIENT
Start: 2021-09-24 | End: 2022-10-07

## 2021-09-24 ASSESSMENT — MIFFLIN-ST. JEOR: SCORE: 1711.54

## 2021-09-24 NOTE — LETTER
"9/24/2021    Alexandru Huggins MD  [address]  [place] [address]    RE: Steven Talaveraon       Dear Colleague,    I had the pleasure of seeing Steven Dye in the [facility].    [facility]    I had the pleasure of seeing Steven when he came for annual follow-up.  This 70 year old sees Dr. Cervantes for his history of:    1. Advanced HB with syncope 2011 - s/p [employer] PPM 4/2011  2. HTN  3. HL   4. Parkinson's -[facility]    I saw Steven 10/2020 at which time he was doing well from a CV/EP perspective. Annual follow-up rec'd to coincide with in-office PPM check.    Interval History:  [place]. No c/o CP/SOB with this. He's very careful not to fall; does not use any assistive devices.    No c/o palpitations, dizziness, lightheadedness. No orthostatic sxs. No CP, SOB. No orthopnea, PND.    About 1 month ago, had an episode of bilateral foot swelling. Nothing that he can think of triggered it - no changes to food/sodium/drinking/medications. It spontaneously resolved after ~2 weeks.      VITALS:  Vitals: /74 (BP Location: Left arm, Cuff Size: Adult Regular)   Pulse 61   Ht 1.854 m (6' 1\")   Wt 90.3 kg (199 lb)   SpO2 97%   BMI 26.25 kg/m      Diagnostic Testing:  EKG today, which I overread, showed ASVP  BS Device interrogation today, showed 21% AP and 100%  in DDDR 60/130. No atrial or ventricular arrhythmias. No escape @ VVI 30 with underlying rhythm SR 60s/CHB  Echocardiogam 9/2019 showed EF 50-55%. Mild LVH. Nl RV. 1+ TR. RVSP wnl 21mmHg. Borderline dilated root and ascending aorta  Component      Latest Ref Rng & Units 6/16/2021   Sodium      133 - 144 mmol/L 142   Potassium      3.4 - 5.3 mmol/L 4.0   Chloride      94 - 109 mmol/L 110 (H)   Carbon Dioxide      20 - 32 mmol/L 29   Anion Gap      3 - 14 mmol/L 3   Glucose      " 70 - 99 mg/dL 96   Urea Nitrogen      7 - 30 mg/dL 25   Creatinine      0.66 - 1.25 mg/dL 0.92   GFR Estimate      >60 mL/min/1.73:m2 83   GFR Estimate If Black      >60 mL/min/1.73:m2 >90   Calcium      8.5 - 10.1 mg/dL 8.8       Plan:  Annual follow-up with PPM check    Assessment/Plan:    1. Advanced Heart Block/Syncope -     S/p PPM as above    PPM check looks good without arrhythmias    SUJATA in ~1.5 years    PLAN:    Continue current device schedule    Plan annual follow-up with in-office PPM check      2. HTN    BPs look good; no issues with orthostasis at this point    BMP from 6/2021 looks good    PLAN:    Continue current meds. Refills x 1 year sent      Oumou Casarez PA-C, MSPAS      Orders Placed This Encounter   Procedures     Follow-Up with Cardiac Advanced Practice Provider     EKG 12-lead complete w/read - Clinics (performed today)     Orders Placed This Encounter   Medications     amLODIPine (NORVASC) 5 MG tablet     Sig: Take 1 tablet (5 mg) by mouth daily     Dispense:  90 tablet     Refill:  3     lisinopril-hydrochlorothiazide (ZESTORETIC) 10-12.5 MG tablet     Sig: Take 1 tablet by mouth daily     Dispense:  90 tablet     Refill:  3     metoprolol succinate ER (TOPROL XL) 50 MG 24 hr tablet     Sig: Take 1 tablet (50 mg) by mouth daily     Dispense:  90 tablet     Refill:  3     Medications Discontinued During This Encounter   Medication Reason     ondansetron (ZOFRAN-ODT) 4 MG ODT tab Stopped by Patient     metoprolol succinate ER (TOPROL XL) 50 MG 24 hr tablet Reorder     amLODIPine (NORVASC) 5 MG tablet Reorder     lisinopril-hydrochlorothiazide (ZESTORETIC) 10-12.5 MG tablet Reorder         Encounter Diagnoses   Name Primary?     Cardiac pacemaker in situ Yes     Atrioventricular block, complete (H)      Essential hypertension with goal blood pressure less than 140/90        CURRENT MEDICATIONS:  Current Outpatient Medications   Medication Sig Dispense Refill     amLODIPine (NORVASC) 5 MG  "tablet Take 1 tablet (5 mg) by mouth daily 90 tablet 3     aspirin 81 MG tablet Take 81 mg by mouth daily       carbidopa-levodopa (SINEMET)  MG per tablet Take 1 tablet by mouth 4 times daily       lisinopril-hydrochlorothiazide (ZESTORETIC) 10-12.5 MG tablet Take 1 tablet by mouth daily 90 tablet 3     metoprolol succinate ER (TOPROL XL) 50 MG 24 hr tablet Take 1 tablet (50 mg) by mouth daily 90 tablet 3     ROPINIROLE HCL PO Take by mouth 3 times daily         ALLERGIES     Allergies   Allergen Reactions     Penicillins      hives     Statin Drugs [Hmg-Coa-R Inhibitors]      Penicillamine Rash         Review of Systems:  Skin:  Negative     Eyes:  Positive for contacts  ENT:  Negative    Respiratory:  Negative for shortness of breath;dyspnea on exertion;cough  Cardiovascular:  Negative for;palpitations;chest pain;dizziness;lightheadedness;fatigue;edema    Gastroenterology: Negative for melena;hematochezia  Genitourinary:  Negative    Musculoskeletal:  Negative    Neurologic:  Positive for involuntary movement;tremors  Psychiatric:  Negative    Heme/Lymph/Imm:  Negative    Endocrine:  Negative      Physical Exam:  Vitals: /74 (BP Location: Left arm, Cuff Size: Adult Regular)   Pulse 61   Ht 1.854 m (6' 1\")   Wt 90.3 kg (199 lb)   SpO2 97%   BMI 26.25 kg/m      Constitutional:  cooperative, alert and oriented, well developed, well nourished, in no acute distress        Skin:  warm and dry to the touch, no apparent skin lesions or masses noted        Head:  normocephalic, no masses or lesions        Eyes:  sclera white;no xanthalasma;conjunctivae and lids unremarkable;pupils equal and round        ENT:  not assessed this visit        Neck:  JVP normal;no carotid bruit        Chest:  normal breath sounds, clear to auscultation, normal A-P diameter, normal symmetry, normal respiratory excursion, no use of accessory muscles        Cardiac: regular rhythm, normal S1/S2, no S3 or S4, apical impulse not " displaced, no murmurs, gallops or rubs                  Abdomen:  abdomen soft        Vascular: pulses full and equal                                      Extremities and Back:  no deformities, clubbing, cyanosis, erythema observed        Neurological:    tremor          PAST MEDICAL HISTORY:  Past Medical History:   Diagnosis Date     Complete AV block (H)     [employer] dual chamber 4/1/2011     Hyperlipidemia      Hypertension      Parkinson disease (H)      Syncope        PAST SURGICAL HISTORY:  Past Surgical History:   Procedure Laterality Date     IMPLANT PACEMAKER         FAMILY HISTORY:  Family History   Problem Relation Age of Onset     C.A.D. Mother      C.A.D. Father        SOCIAL HISTORY:  Social History     Socioeconomic History     Marital status:      Spouse name: None     Number of children: None     Years of education: None     Highest education level: None   Occupational History     None   Tobacco Use     Smoking status: Never Smoker     Smokeless tobacco: Never Used   Substance and Sexual Activity     Alcohol use: Yes     Comment: glass of wine occ     Drug use: Not Currently     Sexual activity: None   Other Topics Concern     Parent/sibling w/ CABG, MI or angioplasty before 65F 55M? Not Asked      Service Not Asked     Blood Transfusions Not Asked     Caffeine Concern Not Asked     Occupational Exposure Not Asked     Hobby Hazards Not Asked     Sleep Concern Not Asked     Stress Concern Not Asked     Weight Concern Not Asked     Special Diet No     Back Care Not Asked     Exercise Yes     Comment: walking almost everyday     Bike Helmet Not Asked     Seat Belt Not Asked     Self-Exams Not Asked   Social History Narrative     None     Social Determinants of Health     Financial Resource Strain:      Difficulty of Paying Living Expenses:    Food Insecurity:      Worried About Running Out of Food in the Last Year:      Ran Out of Food in the Last Year:    Transportation Needs:       Lack of Transportation (Medical):      Lack of Transportation (Non-Medical):    Physical Activity:      Days of Exercise per Week:      Minutes of Exercise per Session:    Stress:      Feeling of Stress :    Social Connections:      Frequency of Communication with Friends and Family:      Frequency of Social Gatherings with Friends and Family:      Attends Scientologist Services:      Active Member of Clubs or Organizations:      Attends Club or Organization Meetings:      Marital Status:    Intimate Partner Violence:      Fear of Current or Ex-Partner:      Emotionally Abused:      Physically Abused:      Sexually Abused:            Thank you for allowing me to participate in the care of your patient.      Sincerely,     Janki Casarez PA-C     [facility]  cc:   Janki Casarez PA-C  [address] [address]

## 2021-09-24 NOTE — PATIENT INSTRUCTIONS
Steven - it was nice to see you again today!    1. Reviewed your pacemaker check - it looked great!  2. EKG today showed pacer working well and normal rhythm   3. Reviewed you're still walking at the mall without falling issues & being very careful!  4. Discussed the swelling in your feet you had ~1 month ago x 2 weeks. Unsure of what that was from as you had no extra salt, alcohol, swelling elsewhere, etc    PLAN:  1. CALL us if swelling comes back. Would get some blood work and check ultrasound of heart (echo)  2. Otherwise, see us 1 year with annual pacer check.    [phone]

## 2021-09-29 ENCOUNTER — HOSPITAL ENCOUNTER (OUTPATIENT)
Dept: PHYSICAL THERAPY | Facility: CLINIC | Age: 71
Setting detail: THERAPIES SERIES
End: 2021-09-29
Payer: COMMERCIAL

## 2021-09-29 DIAGNOSIS — G20.A1 IDIOPATHIC PARKINSON'S DISEASE (H): ICD-10-CM

## 2021-09-29 PROCEDURE — 97162 PT EVAL MOD COMPLEX 30 MIN: CPT | Mod: GP | Performed by: PHYSICAL THERAPIST

## 2021-09-29 PROCEDURE — 97530 THERAPEUTIC ACTIVITIES: CPT | Mod: GP | Performed by: PHYSICAL THERAPIST

## 2021-09-29 NOTE — PROGRESS NOTES
Mini-BESTest: Balance Evaluation Systems Test    1613-2002 [facility]. All rights reserved.    ANTICIPATORY BALANCE  1. SIT TO STAND  Instruction:  Cross your arms across your chest. Try not to use your hands unless you must. Do not let your legs lean  against the back of the chair when you stand. Please stand up now.   (2) Normal: Comes to stand without use of hands and stabilizes independently.  (1) Moderate: Comes to stand WITH use of hands on first attempt.  (0) Severe: Unable to stand up from chair without assistance, OR needs several attempts with use of hands.    2. RISE TO TOES  Instruction:  Place your feet shoulder width apart. Place your hands on your hips. Try to rise as high as you can onto your  toes. I will count out loud to 3 seconds. Try to hold this pose for at least 3 seconds. Look straight ahead. Rise now.   (2) Normal: Stable for 3 s with maximum height.  (1) Moderate: Heels up, but not full range (smaller than when holding hands), OR noticeable instability for 3 s.  (0) Severe: < 3 s.    3. STAND ON ONE LEG  Instruction:  Look straight ahead. Keep your hands on your hips. Lift your leg off of the ground behind you without touching or  resting your raised leg upon your other standing leg. Stay standing on one leg as long as you can. Look straight ahead. Lift  now.   Left: Time in Seconds Trial 1:_____Trial 2:_____  (2) Normal: 20 s.  (1) Moderate: < 20 s.  (0) Severe: Unable.    Right: Time in Seconds Trial 1:_____Trial 2:_____  (2) Normal: 20 s.  (1) Moderate: < 20 s.  (0) Severe: Unable    To score each side separately use the trial with the longest time.  To calculate the sub-score and total score use the side [left or right] with the lowest numerical score [i.e. the worse side].    REACTIVE POSTURAL CONTROL  4. COMPENSATORY STEPPING CORRECTION- FORWARD  Instruction:  Stand with your feet shoulder width apart, arms at your sides. Lean forward against my hands beyond  your  forward limits. When I let go, do whatever is necessary, including taking a step, to avoid a fall.   (2) Normal: Recovers independently with a single, large step (second realignment step is allowed).  (1) Moderate: More than one step used to recover equilibrium.  (0) Severe: No step, OR would fall if not caught, OR falls spontaneously.    5. COMPENSATORY STEPPING CORRECTION- BACKWARD  Instruction:  Stand with your feet shoulder width apart, arms at your sides. Lean backward against my hands beyond your  backward limits. When I let go, do whatever is necessary, including taking a step, to avoid a fall.   (2) Normal: Recovers independently with a single, large step.  (1) Moderate: More than one step used to recover equilibrium.  (0) Severe: No step, OR would fall if not caught, OR falls spontaneously.    6. COMPENSATORY STEPPING CORRECTION- LATERAL  Instruction:  Stand with your feet together, arms down at your sides. Lean into my hand beyond your sideways limit. When I  let go, do whatever is necessary, including taking a step, to avoid a fall.   Left  (2) Normal: Recovers independently with 1 step  (crossover or lateral OK).  (1) Moderate: Several steps to recover equilibrium.  (0) Severe: Falls, or cannot step.    Right  (2) Normal: Recovers independently with 1 step  (crossover or lateral OK).  (1) Moderate: Several steps to recover equilibrium.  (0) Severe: Falls, or cannot step.    To calculate the sub-score and total score use the side [left or right] with the lowest numerical score [i.e. the worse side].    SENSORY ORIENTATION  7. STANCE (FEET TOGETHER); EYES OPEN, FIRM SURFACE  Instruction:  Place your hands on your hips. Place your feet together until almost touching. Look straight ahead. Be as stable  and still as possible, until I say stop.   Time in seconds:________  (2) Normal: 30 s.  (1) Moderate: < 30 s.  (0) Severe: Unable.    8. STANCE (FEET TOGETHER); EYES CLOSED, FOAM SURFACE  Instruction:   Step onto the foam. Place your hands on your hips. Place your feet together until almost touching. Be as stable  and still as possible, until I say stop. I will start timing when you close your eyes.   Time in seconds:________  (2) Normal: 30 s.  (1) Moderate: < 30 s.  (0) Severe: Unable.    9. INCLINE- EYES CLOSED  Instruction:  Step onto the incline ramp. Please stand on the incline ramp with your toes toward the top. Place your feet  shoulder width apart and have your arms down at your sides. I will start timing when you close your eyes.   Time in seconds:________  (2) Normal: Stands independently 30 s and aligns with gravity.  (1) Moderate: Stands independently <30 s OR aligns with surface.  (0) Severe: Unable.    DYNAMIC GAIT  10. CHANGE IN GAIT SPEED  Instruction:  Begin walking at your normal speed, when I tell you  fast , walk as fast as you can. When I say  slow , walk very  slowly.   (2) Normal: Significantly changes walking speed without imbalance.  (1) Moderate: Unable to change walking speed or signs of imbalance.  (0) Severe: Unable to achieve significant change in walking speed AND signs of imbalance.    11. WALK WITH HEAD TURNS - HORIZONTAL  Instruction:  Begin walking at your normal speed, when I say  right , turn your head and look to the right. When I say  left   turn your head and look to the left. Try to keep yourself walking in a straight line.   (2) Normal: performs head turns with no change in gait speed and good balance.  (1) Moderate: performs head turns with reduction in gait speed.  (0) Severe: performs head turns with imbalance.    12. WALK WITH PIVOT TURNS  Instruction:  Begin walking at your normal speed. When I tell you to  turn and stop , turn as quickly as you can, face the  opposite direction, and stop. After the turn, your feet should be close together.   (2) Normal: Turns with feet close FAST (< 3 steps) with good balance.  (1) Moderate: Turns with feet close SLOW (>4 steps)  with good balance.  (0) Severe: Cannot turn with feet close at any speed without imbalance.    13. STEP OVER OBSTACLES  Instruction:  Begin walking at your normal speed. When you get to the box, step over it, not around it and keep walking.   (2) Normal: Able to step over box with minimal change of gait speed and with good balance.  (1) Moderate: Steps over box but touches box OR displays cautious behavior by slowing gait.  (0) Severe: Unable to step over box OR steps around box.    14. TIMED UP & GO WITH DUAL TASK [3 METER WALK]  Instruction TUG:  When I say  Go , stand up from chair, walk at your normal speed across the tape on the floor, turn around,  and come back to sit in the chair.   Instruction TUG with Dual Task:  Count backwards by threes starting at ___. When I say  Go , stand up from chair, walk at  your normal speed across the tape on the floor, turn around, and come back to sit in the chair. Continue counting backwards  the entire time.   TUG: ________seconds; Dual Task TUG: ________seconds  (2) Normal: No noticeable change in sitting, standing or walking while backward counting when compared to TUG without  Dual Task.  (1) Moderate: Dual Task affects either counting OR walking (>10%) when compared to the TUG without Dual Task.  (0) Severe: Stops counting while walking OR stops walking while counting.    When scoring item 14, if subject s gait speed slows more than 10% between the TUG without and with a Dual Task the score  should be decreased by a point.      TOTAL SCORE: ___21____/28      Mini-BESTest: The Mini-BESTest assesses reactive postural, anticipatory, sensory orientation, and dynamic gait balance.  Gait assistive device used: none     Patient Score: 21/28  Scores of <17.5/28 have identified people with chronic stroke that have a history of falling according to James et al 2013.   Scores of <20/32 are predictive of increased falls risk for persons with Parkinsons Disease according to Eladio  et al 2012.    Minimally Detectable Change (MDC) for persons with Parkinsons Disease 5.52pts per Ildefonso et al 2011.  Minimally Clinically Significant Difference (MCID) for persons with Balance Disorders: 4pts according to Antonio et al 2013.

## 2021-09-29 NOTE — PROGRESS NOTES
09/29/21 0700   Quick Adds   Quick Adds Certification   Type of Visit Initial OP PT Evaluation   General Information   Start of Care Date 09/29/21   Referring Physician Naima Fuller MD   Orders Evaluate and Treat as Indicated   Order Date 08/30/21   Medical Diagnosis Idiopathic Parkinson's diseaes   Onset of illness/injury or Date of Surgery 08/30/21  (date of order)   Surgical/Medical history reviewed Yes   Pertinent history of current problem (include personal factors and/or comorbidities that impact the POC) Pt was diagnosed with Parkinson's Disease in 2010.   He is currently taking carbidopa-levodopa 4x/day. Last dose at 7am and next dose at 11 am. Pt notes that he has difficulty getting out of bed, soft chairs, car transfers due to slowness and difficulty with soft . He has to be careful with stairs and walking.  He has been having difficulty with his balance unsteady with turns, uneven surfaces, pt has double vision which he will be having some prisms.   He has had a number of close calls, but denies falls.  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  Pt owns a SEC, however does not use.   Also has a bedrail, which he is unsure if it is helping.   Non-motor symptoms: loss of smell, orthostatic hypotension, double vision, memory issues, sleep disturbances, fatigue.  Pt is a retired  in [employer].  Pt lives in a 2 level home with spouse with multiple stairs presents.  No formal exercise program now due to inconsistent compliance to prior HEP and walking program.    Pertinent Visual History  double vision   Home/Community Accessibility Comments Pt is a retired  in [employer].  Pt lives in a 2 level home with spouse with multiple stairs presents.  No formal exercise program now due to inconsistent compliance to prior HEP and walking program.    Patient/Family Goals Statement increased ease with functional mobility and improve balance  especially with turns   Fall Risk Screen   Fall screen completed by PT   Have you fallen 2 or more times in the past year? No   Have you fallen and had an injury in the past year? No   Timed Up and Go score (seconds) 10.39 seconds   Is patient a fall risk? Yes   Fall screen comments based on FGA   Abuse Screen (yes response referral indicated)   Feels Unsafe at Home or Work/School no   Feels Threatened by Someone no   Pain   Patient currently in pain No   Cognitive Status Examination   Orientation orientation to person, place and time   Level of Consciousness alert   Posture   Posture Protracted shoulders;Forward head position   Posture Comments increased thoracic kyphosis and downward head position with slight right rotation   Range of Motion (ROM)   ROM Comment B LE WFL,  Trunk and cervical ROM limited due to rigidity and postural changes   Bed Mobility   Bed Mobility Comments Slow speed and pt tends to lay on the edge of the mat:  Sit to supine: 11 seconds, but unable to scoot to middle of the bed.  Supine to sit: 23 seconds with pt moving into long sitting then trying to scoot to the edge   Transfer Skills   Transfer Comments Increased effort with sit to stand   Gait   Gait Comments Reciprocal steps, decreased step length and diminished heel strike especially as pt fatigued.    Gait Special Tests 25 Foot Timed Walk   Seconds 6.06   Steps 12 Steps   Comments preferred gait speed: 1.24 m/s - wife noted perforamnce was fast compared to home; fast gait speed: 4.38sec = 1.73 m/s   Gait Special Tests Functional Gait Assessment Score out of 30   Score out of 30 19   Comments <22/30 indicates fall risk   Balance Special Tests   Balance special tests other 360 degree turn: 10 reps CCW, 9 steps CW,  mini BEST test: 21/28 (20/28 indicates fall risk in PD)   Balance Special Tests Timed Up and Go   Seconds 11.11 Seconds   Comments Cognitive TUG with task of counting backwards by 3s from 100 (100-82 correctly)   Balance  "Special Tests Sit to Stand Reps in 30 Seconds   Reps in 30 seconds 14   Height 17   Comments without UE A,  Age/gender norms: 15 reps   Sensory Examination   Sensory Perception no deficits were identified   Coordination   Coordination no deficits were identified   Coordination Comments intact with alt toe tap   Planned Therapy Interventions   Planned Therapy Interventions bed mobility training;gait training;neuromuscular re-education;strengthening;stretching;transfer training;manual therapy   Clinical Impression   Criteria for Skilled Therapeutic Interventions Met yes, treatment indicated   PT Diagnosis bradykinesia, decreased functional mobility, impaired balance   Influenced by the following impairments decreased functional strength, bradykinesia, impaired balance, poor posture   Functional limitations due to impairments difficulty with bed mobility, transfers and gait for household and community participation and mobility   Clinical Presentation Evolving/Changing   Clinical Presentation Rationale presentation, PMH, 30\" Chair stand, mini BEST test, FGA, 360 degree turn,    Clinical Decision Making (Complexity) Moderate complexity   Therapy Frequency   (2-3x/wk)   Predicted Duration of Therapy Intervention (days/wks) up to 90 days   Risk & Benefits of therapy have been explained Yes   Patient, Family & other staff in agreement with plan of care Yes   Education Assessment   Preferred Learning Style Demonstration;Pictures/video;Listening   Barriers to Learning No barriers   GOALS   PT Eval Goals 1;2;3;4;5   Goal 1   Goal Identifier Bed mobility   Goal Description Pt will complete bed mobility in <15 seconds for supine to sit   Target Date 12/27/21   Goal 2   Goal Identifier FGA   Goal Description Pt will score >/=22/30 on the FGA in order to reduce fall risk   Target Date 12/27/21   Goal 3   Goal Identifier 30\" Chair Stand   Goal Description Pt will complete >/=15 stands in 30\" in order to demosntrates improved " functional strength to ease transfers from low surfaces   Target Date 12/27/21   Goal 4   Goal Identifier HEP   Goal Description Pt will be independent with a HEP to self manage symptoms   Target Date 12/27/21   Goal 5   Goal Identifier Car transfer   Goal Description Pt will demonstrates reduced time and improved efficiency with car transfers in order to safely access community   Target Date 12/27/21   Total Evaluation Time   PT Eval, Moderate Complexity Minutes (70534) 50   Therapy Certification   Certification date from 09/29/21   Certification date to 12/27/21   Medical Diagnosis Idiopathic Parkinson's disease

## 2021-09-29 NOTE — PROGRESS NOTES
"                                                                                              [facility]        OUTPATIENT PHYSICAL THERAPY FUNCTIONAL EVALUATION  PLAN OF TREATMENT FOR OUTPATIENT REHABILITATION  (COMPLETE FOR INITIAL CLAIMS ONLY)  Patient's Last Name, First Name, M.I.  YOB: 1950  Steven Dye     Provider's Name   [facility]   Medical Record No.  [id number]     Start of Care Date:  09/29/21   Onset Date:  08/30/21 (date of order)   Type:     _X__PT   ____OT  ____SLP Medical Diagnosis:  Idiopathic Parkinson's disease     PT Diagnosis:  bradykinesia, decreased functional mobility, impaired balance Visits from SOC:  1                              __________________________________________________________________________________  Plan of Treatment/Functional Goals:  bed mobility training, gait training, neuromuscular re-education, strengthening, stretching, transfer training, manual therapy           GOALS  Bed mobility  Pt will complete bed mobility in <15 seconds for supine to sit  12/27/21    FGA  Pt will score >/=22/30 on the FGA in order to reduce fall risk  12/27/21    30\" Chair Stand  Pt will complete >/=15 stands in 30\" in order to demosntrates improved functional strength to ease transfers from low surfaces  12/27/21    HEP  Pt will be independent with a HEP to self manage symptoms  12/27/21    Car transfer  Pt will demonstrates reduced time and improved efficiency with car transfers in order to safely access community  12/27/21         Therapy Frequency:   (2-3x/wk)   Predicted Duration of Therapy Intervention:  up to 90 days    Edwin Lechuga, PT                                    I CERTIFY THE NEED FOR THESE SERVICES FURNISHED UNDER        THIS PLAN OF TREATMENT AND WHILE UNDER MY CARE     (Physician co-signature of this document indicates review and certification of the therapy plan).                Certification Date " From:  09/29/21   Certification Date To:  12/27/21    Referring Provider:  Naima Fuller MD    Initial Assessment  See Epic Evaluation- Start of Care Date: 09/29/21

## 2021-09-30 ENCOUNTER — HOSPITAL ENCOUNTER (OUTPATIENT)
Dept: PHYSICAL THERAPY | Facility: CLINIC | Age: 71
Setting detail: THERAPIES SERIES
End: 2021-09-30
Attending: INTERNAL MEDICINE
Payer: COMMERCIAL

## 2021-09-30 PROCEDURE — 97110 THERAPEUTIC EXERCISES: CPT | Mod: GP | Performed by: PHYSICAL THERAPIST

## 2021-09-30 PROCEDURE — 97530 THERAPEUTIC ACTIVITIES: CPT | Mod: GP | Performed by: PHYSICAL THERAPIST

## 2021-10-04 LAB
MDC_IDC_LEAD_IMPLANT_DT: NORMAL
MDC_IDC_LEAD_IMPLANT_DT: NORMAL
MDC_IDC_LEAD_LOCATION: NORMAL
MDC_IDC_LEAD_LOCATION: NORMAL
MDC_IDC_LEAD_LOCATION_DETAIL_1: NORMAL
MDC_IDC_LEAD_LOCATION_DETAIL_1: NORMAL
MDC_IDC_LEAD_MFG: NORMAL
MDC_IDC_LEAD_MFG: NORMAL
MDC_IDC_LEAD_MODEL: NORMAL
MDC_IDC_LEAD_MODEL: NORMAL
MDC_IDC_LEAD_POLARITY_TYPE: NORMAL
MDC_IDC_LEAD_POLARITY_TYPE: NORMAL
MDC_IDC_LEAD_SERIAL: NORMAL
MDC_IDC_LEAD_SERIAL: NORMAL
MDC_IDC_MSMT_BATTERY_STATUS: NORMAL
MDC_IDC_MSMT_LEADCHNL_RA_IMPEDANCE_VALUE: 660 OHM
MDC_IDC_MSMT_LEADCHNL_RA_PACING_THRESHOLD_AMPLITUDE: 0.6 V
MDC_IDC_MSMT_LEADCHNL_RA_PACING_THRESHOLD_PULSEWIDTH: 0.5 MS
MDC_IDC_MSMT_LEADCHNL_RV_IMPEDANCE_VALUE: 720 OHM
MDC_IDC_MSMT_LEADCHNL_RV_PACING_THRESHOLD_AMPLITUDE: 1 V
MDC_IDC_MSMT_LEADCHNL_RV_PACING_THRESHOLD_PULSEWIDTH: 0.4 MS
MDC_IDC_PG_IMPLANT_DTM: NORMAL
MDC_IDC_PG_MFG: NORMAL
MDC_IDC_PG_MODEL: NORMAL
MDC_IDC_PG_SERIAL: NORMAL
MDC_IDC_PG_TYPE: NORMAL
MDC_IDC_SESS_CLINIC_NAME: NORMAL
MDC_IDC_SESS_DTM: NORMAL
MDC_IDC_SESS_TYPE: NORMAL
MDC_IDC_SET_BRADY_AT_MODE_SWITCH_MODE: NORMAL
MDC_IDC_SET_BRADY_AT_MODE_SWITCH_RATE: 170 {BEATS}/MIN
MDC_IDC_SET_BRADY_HYSTRATE: NORMAL
MDC_IDC_SET_BRADY_LOWRATE: 60 {BEATS}/MIN
MDC_IDC_SET_BRADY_MAX_SENSOR_RATE: 130 {BEATS}/MIN
MDC_IDC_SET_BRADY_MAX_TRACKING_RATE: 130 {BEATS}/MIN
MDC_IDC_SET_BRADY_MODE: NORMAL
MDC_IDC_SET_BRADY_PAV_DELAY_HIGH: 180 MS
MDC_IDC_SET_BRADY_PAV_DELAY_LOW: 250 MS
MDC_IDC_SET_BRADY_SAV_DELAY_HIGH: 180 MS
MDC_IDC_SET_BRADY_SAV_DELAY_LOW: 250 MS
MDC_IDC_SET_LEADCHNL_RA_PACING_AMPLITUDE: 2 V
MDC_IDC_SET_LEADCHNL_RA_PACING_CAPTURE_MODE: NORMAL
MDC_IDC_SET_LEADCHNL_RA_PACING_POLARITY: NORMAL
MDC_IDC_SET_LEADCHNL_RA_PACING_PULSEWIDTH: 0.5 MS
MDC_IDC_SET_LEADCHNL_RA_SENSING_ADAPTATION_MODE: NORMAL
MDC_IDC_SET_LEADCHNL_RA_SENSING_POLARITY: NORMAL
MDC_IDC_SET_LEADCHNL_RA_SENSING_SENSITIVITY: 0.5 MV
MDC_IDC_SET_LEADCHNL_RV_PACING_AMPLITUDE: 1.3 V
MDC_IDC_SET_LEADCHNL_RV_PACING_CAPTURE_MODE: NORMAL
MDC_IDC_SET_LEADCHNL_RV_PACING_POLARITY: NORMAL
MDC_IDC_SET_LEADCHNL_RV_PACING_PULSEWIDTH: 0.4 MS
MDC_IDC_SET_LEADCHNL_RV_SENSING_ADAPTATION_MODE: NORMAL
MDC_IDC_SET_LEADCHNL_RV_SENSING_POLARITY: NORMAL
MDC_IDC_SET_LEADCHNL_RV_SENSING_SENSITIVITY: 2.5 MV
MDC_IDC_STAT_AT_DTM_END: NORMAL
MDC_IDC_STAT_AT_DTM_START: NORMAL
MDC_IDC_STAT_AT_MODE_SW_COUNT: 0
MDC_IDC_STAT_AT_MODE_SW_MAX_DURATION: 0 S
MDC_IDC_STAT_BRADY_DTM_END: NORMAL
MDC_IDC_STAT_BRADY_DTM_START: NORMAL
MDC_IDC_STAT_BRADY_RA_PERCENT_PACED: 21 %
MDC_IDC_STAT_BRADY_RV_PERCENT_PACED: 100 %
MDC_IDC_STAT_EPISODE_RECENT_COUNT: 0
MDC_IDC_STAT_EPISODE_RECENT_COUNT_DTM_END: NORMAL
MDC_IDC_STAT_EPISODE_RECENT_COUNT_DTM_START: NORMAL
MDC_IDC_STAT_EPISODE_TYPE: NORMAL

## 2021-10-12 ENCOUNTER — TELEPHONE (OUTPATIENT)
Dept: FAMILY MEDICINE | Facility: CLINIC | Age: 71
End: 2021-10-12

## 2021-10-12 NOTE — TELEPHONE ENCOUNTER
Please abstract the following data from this visit with this patient into the appropriate field in Epic:    Tests that can be patient reported without a hard copy:    Colonoscopy done on this date: 09/13/2021 (approximately), by this group: [facility]

## 2021-11-08 ENCOUNTER — HOSPITAL ENCOUNTER (OUTPATIENT)
Dept: PHYSICAL THERAPY | Facility: CLINIC | Age: 71
Setting detail: THERAPIES SERIES
End: 2021-11-08
Payer: COMMERCIAL

## 2021-11-08 PROCEDURE — 97530 THERAPEUTIC ACTIVITIES: CPT | Mod: GP | Performed by: PHYSICAL THERAPIST

## 2021-11-08 PROCEDURE — 97110 THERAPEUTIC EXERCISES: CPT | Mod: GP | Performed by: PHYSICAL THERAPIST

## 2021-11-10 ENCOUNTER — HOSPITAL ENCOUNTER (OUTPATIENT)
Dept: PHYSICAL THERAPY | Facility: CLINIC | Age: 71
Setting detail: THERAPIES SERIES
End: 2021-11-10
Payer: COMMERCIAL

## 2021-11-10 PROCEDURE — 97530 THERAPEUTIC ACTIVITIES: CPT | Mod: GP | Performed by: PHYSICAL THERAPIST

## 2021-11-17 ENCOUNTER — HOSPITAL ENCOUNTER (OUTPATIENT)
Dept: PHYSICAL THERAPY | Facility: CLINIC | Age: 71
Setting detail: THERAPIES SERIES
End: 2021-11-17
Payer: COMMERCIAL

## 2021-11-17 PROCEDURE — 97530 THERAPEUTIC ACTIVITIES: CPT | Mod: GP | Performed by: PHYSICAL THERAPIST

## 2021-11-17 PROCEDURE — 97110 THERAPEUTIC EXERCISES: CPT | Mod: GP | Performed by: PHYSICAL THERAPIST

## 2021-11-18 ENCOUNTER — HOSPITAL ENCOUNTER (OUTPATIENT)
Dept: PHYSICAL THERAPY | Facility: CLINIC | Age: 71
Setting detail: THERAPIES SERIES
End: 2021-11-18
Payer: COMMERCIAL

## 2021-11-18 PROCEDURE — 97110 THERAPEUTIC EXERCISES: CPT | Mod: GP | Performed by: PHYSICAL THERAPIST

## 2021-11-18 PROCEDURE — 97530 THERAPEUTIC ACTIVITIES: CPT | Mod: GP | Performed by: PHYSICAL THERAPIST

## 2021-11-22 ENCOUNTER — HOSPITAL ENCOUNTER (OUTPATIENT)
Dept: PHYSICAL THERAPY | Facility: CLINIC | Age: 71
Setting detail: THERAPIES SERIES
End: 2021-11-22
Payer: COMMERCIAL

## 2021-11-22 PROCEDURE — 97530 THERAPEUTIC ACTIVITIES: CPT | Mod: GP | Performed by: PHYSICAL THERAPIST

## 2021-11-22 PROCEDURE — 97110 THERAPEUTIC EXERCISES: CPT | Mod: GP | Performed by: PHYSICAL THERAPIST

## 2021-11-24 ENCOUNTER — HOSPITAL ENCOUNTER (OUTPATIENT)
Dept: PHYSICAL THERAPY | Facility: CLINIC | Age: 71
Setting detail: THERAPIES SERIES
End: 2021-11-24
Payer: COMMERCIAL

## 2021-11-24 PROCEDURE — 97530 THERAPEUTIC ACTIVITIES: CPT | Mod: GP | Performed by: PHYSICAL THERAPIST

## 2021-11-24 PROCEDURE — 97110 THERAPEUTIC EXERCISES: CPT | Mod: GP | Performed by: PHYSICAL THERAPIST

## 2021-11-29 ENCOUNTER — HOSPITAL ENCOUNTER (OUTPATIENT)
Dept: PHYSICAL THERAPY | Facility: CLINIC | Age: 71
Setting detail: THERAPIES SERIES
End: 2021-11-29
Payer: COMMERCIAL

## 2021-11-29 PROCEDURE — 97110 THERAPEUTIC EXERCISES: CPT | Mod: GP | Performed by: PHYSICAL THERAPIST

## 2021-11-29 PROCEDURE — 97530 THERAPEUTIC ACTIVITIES: CPT | Mod: GP | Performed by: PHYSICAL THERAPIST

## 2021-12-01 ENCOUNTER — HOSPITAL ENCOUNTER (OUTPATIENT)
Dept: PHYSICAL THERAPY | Facility: CLINIC | Age: 71
Setting detail: THERAPIES SERIES
End: 2021-12-01
Payer: COMMERCIAL

## 2021-12-01 PROCEDURE — 97110 THERAPEUTIC EXERCISES: CPT | Mod: GP | Performed by: PHYSICAL THERAPIST

## 2021-12-01 PROCEDURE — 97530 THERAPEUTIC ACTIVITIES: CPT | Mod: GP | Performed by: PHYSICAL THERAPIST

## 2021-12-01 PROCEDURE — 97750 PHYSICAL PERFORMANCE TEST: CPT | Mod: GP | Performed by: PHYSICAL THERAPIST

## 2021-12-01 NOTE — PROGRESS NOTES
"[facility]    Outpatient Physical Therapy Progress Note  Patient: Steven Dye  : 1950    Beginning/End Dates of Reporting Period:  2021 to 2021    Referring Provider: Naima Fuller MD    Therapy Diagnosis: Bradykinesia, decreased functional mobility, impaired balance     Client Self Report: Nothing new to report.    Objective Measurements:  Objective Measure: Bed Mobility  Details: 3.19 seconds on firm mattress    Objective Measure: FGA  Details:     Objective Measure: 30\" Chair Stand  Details: 14 reps from 17\" chair without UE A    Objective Measure: HEP  Details: Working toward completing daily    Objective Measure: Car Transfers  Details: Variable but overall more efficient per pt report    Goals:  Goal Identifier Bed mobility   Goal Description Pt will complete bed mobility in <15 seconds for supine to sit   Target Date 21   Date Met      Progress (detail required for progress note): Pt completed bed mobility in 3.19 seconds on firm mattress this date. Will continue to progress on soft mattress.     Goal Identifier FGA   Goal Description Pt will score >/=22/30 on the FGA in order to reduce fall risk   Target Date 21   Date Met      Progress (detail required for progress note): Pt improved score from  to  but still remains at increased risk for falls with score < 22/30. Will continue to work on dynamic balance.     Goal Identifier 30\" Chair Stand   Goal Description Pt will complete >/=15 stands in 30\" in order to demosntrates improved functional strength to ease transfers from low surfaces   Target Date 21   Date Met      Progress (detail required for progress note): Pt completes 14 reps this date with no change from baseline. Will continue to progress functional leg strength.     Goal Identifier HEP   Goal Description Pt will be independent with " a HEP to self manage symptoms   Target Date 12/27/21   Date Met      Progress (detail required for progress note): Pt is still inconsistent with completing HEP IND. Will continue to reinforce.     Goal Identifier Car transfer   Goal Description Pt will demonstrates reduced time and improved efficiency with car transfers in order to safely access community   Target Date 12/27/21   Date Met      Progress (detail required for progress note): Pt notes overall improved efficiency with car transfers but is still variable at times. Will continue to practice.     Plan:  Continue therapy per current plan of care.    Discharge:  No

## 2021-12-01 NOTE — PROGRESS NOTES
12/01/21 0800   Signing Clinician's Name / Credentials   Signing clinician's name / credentials Denice Aden, SPT / Edwin Lechuga, PT, DPT, NCS   Functional Gait Assessment (DINA Roper, LAWSON Aguayo., et al. (2004))   1. GAIT LEVEL SURFACE 3  (4.12 seconds)   2. CHANGE IN GAIT SPEED 3   3. GAIT WITH HORIZONTAL HEAD TURNS 2   4. GAIT WITH VERTICAL HEAD TURNS 2   5. GAIT AND PIVOT TURN 3   6. STEP OVER OBSTACLE 3   7. GAIT WITH NARROW BASE OF SUPPORT 0   8. GAIT WITH EYES CLOSED 1  (14.96 seconds)   9. AMBULATING BACKWARDS 2  (12.96 seconds)   10. STEPS 2   Total Functional Gait Assessment Score   TOTAL SCORE: (MAXIMUM SCORE 30) 21     Functional Gait Assessment (FGA): The FGA assesses postural stability during various walking tasks.   Gait assistive device used: None    Patient Score: 21/30  Scores of <22/30 have been correlated with predicting falls in community-dwelling older adults according to Jacquelin & Heron 2010.   Scores of <18/30 have been correlated with increased risk for falls in patients with Parkinsons Disease according to Rony Skelton Zhou et al 2014.  Minimal Detectable Change for patients with acute/chronic stroke = 4.2 according to Thinba & Ritschel 2009  Minimal Detectable Change for patients with vestibular disorder = 8 according to Jacquelin & Heron 2010    Assessment (rationale for performing, application to patient s function & care plan): Completed FGA to assess dynamic balance and risk for falls. Pt improved score from 19/30 to 21/30 but still remains at increased risk for falls with score < 22/30.    30-Second Sit to Stand Test:  The test is conducted with a straight back chair, without armrests, with a 17-inch seat height.    Patient Score (score =0 if must use arms) 14 reps    The 30 Second Sit to Stand Test is considered a test of fall risk.  Data from [place] [employer], cosponsored by [place] [employer] [facility]:  If must use arms = High Fall Risk regardless of reps  8 or less times = High Fall  Risk   9 to 12 times = Moderate Risk  13 or more times = Low Risk    The 30 Second Sit to Stand Test is also considered a test of leg strength and endurance.   Normative Data from Yandel et al,. 2001  Age                 Reps: Men        Reps: Women  60-64                14-19                       12-17                               65-69                12-18                       11-16                    70-74                12-17                       10-15              75-79                11-17                       10-15                    80-84                10-15                         9-14  85-89                 8-14                          8-13  90-94                 7-12                          4-11    Assessment (rationale for performing, application to patient s function & care plan): Completed to assess functional leg strength and endurance. Pt completes 14 reps this date, which is the same as baseline. Will continue to progress functional leg strength.    Minutes billed as physical performance: 20

## 2021-12-06 ENCOUNTER — HOSPITAL ENCOUNTER (OUTPATIENT)
Dept: PHYSICAL THERAPY | Facility: CLINIC | Age: 71
Setting detail: THERAPIES SERIES
End: 2021-12-06
Payer: COMMERCIAL

## 2021-12-06 PROCEDURE — 97530 THERAPEUTIC ACTIVITIES: CPT | Mod: GP | Performed by: PHYSICAL THERAPIST

## 2021-12-06 PROCEDURE — 97110 THERAPEUTIC EXERCISES: CPT | Mod: GP | Performed by: PHYSICAL THERAPIST

## 2021-12-08 ENCOUNTER — HOSPITAL ENCOUNTER (OUTPATIENT)
Dept: PHYSICAL THERAPY | Facility: CLINIC | Age: 71
Setting detail: THERAPIES SERIES
End: 2021-12-08
Payer: COMMERCIAL

## 2021-12-08 PROCEDURE — 97530 THERAPEUTIC ACTIVITIES: CPT | Mod: GP | Performed by: PHYSICAL THERAPIST

## 2021-12-13 ENCOUNTER — HOSPITAL ENCOUNTER (OUTPATIENT)
Dept: PHYSICAL THERAPY | Facility: CLINIC | Age: 71
Setting detail: THERAPIES SERIES
End: 2021-12-13
Payer: COMMERCIAL

## 2021-12-13 PROCEDURE — 97530 THERAPEUTIC ACTIVITIES: CPT | Mod: GP | Performed by: PHYSICAL THERAPIST

## 2021-12-13 PROCEDURE — 97110 THERAPEUTIC EXERCISES: CPT | Mod: GP | Performed by: PHYSICAL THERAPIST

## 2021-12-15 ENCOUNTER — HOSPITAL ENCOUNTER (OUTPATIENT)
Dept: PHYSICAL THERAPY | Facility: CLINIC | Age: 71
Setting detail: THERAPIES SERIES
End: 2021-12-15
Payer: COMMERCIAL

## 2021-12-15 PROCEDURE — 97530 THERAPEUTIC ACTIVITIES: CPT | Mod: GP | Performed by: PHYSICAL THERAPIST

## 2021-12-15 PROCEDURE — 97110 THERAPEUTIC EXERCISES: CPT | Mod: GP | Performed by: PHYSICAL THERAPIST

## 2021-12-16 ENCOUNTER — OFFICE VISIT (OUTPATIENT)
Dept: FAMILY MEDICINE | Facility: CLINIC | Age: 71
End: 2021-12-16
Payer: COMMERCIAL

## 2021-12-16 VITALS
TEMPERATURE: 97.5 F | SYSTOLIC BLOOD PRESSURE: 128 MMHG | HEART RATE: 62 BPM | BODY MASS INDEX: 25.58 KG/M2 | RESPIRATION RATE: 18 BRPM | DIASTOLIC BLOOD PRESSURE: 79 MMHG | OXYGEN SATURATION: 100 % | HEIGHT: 73 IN | WEIGHT: 193 LBS

## 2021-12-16 DIAGNOSIS — Z12.5 ENCOUNTER FOR SCREENING FOR MALIGNANT NEOPLASM OF PROSTATE: ICD-10-CM

## 2021-12-16 DIAGNOSIS — R60.0 BILATERAL LEG EDEMA: ICD-10-CM

## 2021-12-16 DIAGNOSIS — R35.0 BENIGN PROSTATIC HYPERPLASIA WITH URINARY FREQUENCY: ICD-10-CM

## 2021-12-16 DIAGNOSIS — R06.9 UNSPECIFIED ABNORMALITIES OF BREATHING: ICD-10-CM

## 2021-12-16 DIAGNOSIS — N40.1 BENIGN PROSTATIC HYPERPLASIA WITH URINARY FREQUENCY: ICD-10-CM

## 2021-12-16 DIAGNOSIS — Z00.00 ENCOUNTER FOR PREVENTATIVE ADULT HEALTH CARE EXAMINATION: Primary | ICD-10-CM

## 2021-12-16 LAB
ALBUMIN SERPL-MCNC: 3.7 G/DL (ref 3.4–5)
ALP SERPL-CCNC: 60 U/L (ref 40–150)
ALT SERPL W P-5'-P-CCNC: 9 U/L (ref 0–70)
ANION GAP SERPL CALCULATED.3IONS-SCNC: 6 MMOL/L (ref 3–14)
AST SERPL W P-5'-P-CCNC: 15 U/L (ref 0–45)
BASOPHILS # BLD AUTO: 0 10E3/UL (ref 0–0.2)
BASOPHILS NFR BLD AUTO: 0 %
BILIRUB SERPL-MCNC: 0.6 MG/DL (ref 0.2–1.3)
BUN SERPL-MCNC: 24 MG/DL (ref 7–30)
CALCIUM SERPL-MCNC: 8.7 MG/DL (ref 8.5–10.1)
CHLORIDE BLD-SCNC: 107 MMOL/L (ref 94–109)
CO2 SERPL-SCNC: 26 MMOL/L (ref 20–32)
CREAT SERPL-MCNC: 0.98 MG/DL (ref 0.66–1.25)
EOSINOPHIL # BLD AUTO: 0.1 10E3/UL (ref 0–0.7)
EOSINOPHIL NFR BLD AUTO: 2 %
ERYTHROCYTE [DISTWIDTH] IN BLOOD BY AUTOMATED COUNT: 13.5 % (ref 10–15)
GFR SERPL CREATININE-BSD FRML MDRD: 77 ML/MIN/1.73M2
GLUCOSE BLD-MCNC: 98 MG/DL (ref 70–99)
HCT VFR BLD AUTO: 48.4 % (ref 40–53)
HGB BLD-MCNC: 15.8 G/DL (ref 13.3–17.7)
LYMPHOCYTES # BLD AUTO: 1.8 10E3/UL (ref 0.8–5.3)
LYMPHOCYTES NFR BLD AUTO: 32 %
MCH RBC QN AUTO: 30.5 PG (ref 26.5–33)
MCHC RBC AUTO-ENTMCNC: 32.6 G/DL (ref 31.5–36.5)
MCV RBC AUTO: 93 FL (ref 78–100)
MONOCYTES # BLD AUTO: 0.5 10E3/UL (ref 0–1.3)
MONOCYTES NFR BLD AUTO: 9 %
NEUTROPHILS # BLD AUTO: 3.1 10E3/UL (ref 1.6–8.3)
NEUTROPHILS NFR BLD AUTO: 56 %
NT-PROBNP SERPL-MCNC: 170 PG/ML (ref 0–125)
PLATELET # BLD AUTO: 147 10E3/UL (ref 150–450)
POTASSIUM BLD-SCNC: 4.2 MMOL/L (ref 3.4–5.3)
PROT SERPL-MCNC: 6.9 G/DL (ref 6.8–8.8)
PSA SERPL-MCNC: 2.02 UG/L (ref 0–4)
RBC # BLD AUTO: 5.18 10E6/UL (ref 4.4–5.9)
SODIUM SERPL-SCNC: 139 MMOL/L (ref 133–144)
WBC # BLD AUTO: 5.5 10E3/UL (ref 4–11)

## 2021-12-16 PROCEDURE — 83880 ASSAY OF NATRIURETIC PEPTIDE: CPT | Performed by: INTERNAL MEDICINE

## 2021-12-16 PROCEDURE — 80061 LIPID PANEL: CPT | Performed by: INTERNAL MEDICINE

## 2021-12-16 PROCEDURE — 85025 COMPLETE CBC W/AUTO DIFF WBC: CPT | Performed by: INTERNAL MEDICINE

## 2021-12-16 PROCEDURE — 36415 COLL VENOUS BLD VENIPUNCTURE: CPT | Performed by: INTERNAL MEDICINE

## 2021-12-16 PROCEDURE — 99397 PER PM REEVAL EST PAT 65+ YR: CPT | Performed by: INTERNAL MEDICINE

## 2021-12-16 PROCEDURE — 99213 OFFICE O/P EST LOW 20 MIN: CPT | Mod: 25 | Performed by: INTERNAL MEDICINE

## 2021-12-16 PROCEDURE — 80053 COMPREHEN METABOLIC PANEL: CPT | Performed by: INTERNAL MEDICINE

## 2021-12-16 PROCEDURE — G0103 PSA SCREENING: HCPCS | Performed by: INTERNAL MEDICINE

## 2021-12-16 RX ORDER — TAMSULOSIN HYDROCHLORIDE 0.4 MG/1
0.4 CAPSULE ORAL DAILY
Qty: 30 CAPSULE | Refills: 11 | Status: SHIPPED | OUTPATIENT
Start: 2021-12-16 | End: 2022-10-07

## 2021-12-16 ASSESSMENT — ACTIVITIES OF DAILY LIVING (ADL): CURRENT_FUNCTION: NO ASSISTANCE NEEDED

## 2021-12-16 ASSESSMENT — MIFFLIN-ST. JEOR: SCORE: 1679.32

## 2021-12-16 NOTE — PROGRESS NOTES
"SUBJECTIVE:   Steven Dye is a 71 year old male who presents for Preventive Visit.  Patient's concerns at this point would be lower extremity swelling and an increase in urinary urgency.    Otherwise no significant variances.  He walks with his wife.  He states sometimes he has a difficulty keeping up with her in terms of her pace.  Patient has Parkinson's disease.  He has had no falls recently.  No chest pain.  No increase in exertional intolerance that he is aware of    He denies any changes in diet.  Overall he eats enough protein daily.  Sometimes does not eat as many vegetables as he perhaps should.    States he has to get up several times in the evening.  States that he does have a lot of difficulty at night with sleep.  He calls himself a light sleeper.  No obvious apnea per his wife's witness.      Patient has been advised of split billing requirements and indicates understanding: Yes   Are you in the first 12 months of your Medicare coverage?  No    Healthy Habits:     In general, how would you rate your overall health?  Fair    Frequency of exercise:  2-3 days/week    Duration of exercise:  30-45 minutes    Do you usually eat at least 4 servings of fruit and vegetables a day, include whole grains    & fiber and avoid regularly eating high fat or \"junk\" foods?  Yes    Taking medications regularly:  Yes    Barriers to taking medications:  None    Medication side effects:  None    Ability to successfully perform activities of daily living:  No assistance needed    Home Safety:  No safety concerns identified    Hearing Impairment:  No hearing concerns    In the past 6 months, have you been bothered by leaking of urine?  No    In general, how would you rate your overall mental or emotional health?  Excellent      PHQ-2 Total Score: 0    Additional concerns today:  No    Do you feel safe in your environment? Yes    Have you ever done Advance Care Planning? (For example, a Health Directive, POLST, or a " discussion with a medical provider or your loved ones about your wishes): No, advance care planning information given to patient to review.  Patient plans to discuss their wishes with loved ones or provider.         Fall risk       Cognitive Screening   1) Repeat 3 items (Leader, Season, Table)    2) Clock draw: NORMAL  3) 3 item recall: Recalls 2 objects   Results: NORMAL clock, 1-2 items recalled: COGNITIVE IMPAIRMENT LESS LIKELY    Mini-CogTM Copyright TY Gonzalez. Licensed by the author for use in [facility]; reprinted with permission (mike@[facility]). All rights reserved.      Do you have sleep apnea, excessive snoring or daytime drowsiness?: yes    Reviewed and updated as needed this visit by clinical staff                Reviewed and updated as needed this visit by Provider               Social History     Tobacco Use     Smoking status: Never Smoker     Smokeless tobacco: Never Used   Substance Use Topics     Alcohol use: Yes     Comment: glass of wine occ     If you drink alcohol do you typically have >3 drinks per day or >7 drinks per week? No    No flowsheet data found.No flowsheet data found.            Current providers sharing in care for this patient include:   Patient Care Team:  Alexandru Huggins MD as PCP - General (Internal Medicine)  Janki Casarez PA-C as Assigned Heart and Vascular Provider  Alexandru Huggins MD as Assigned PCP  Dneise Ohara MD as MD (Ophthalmology)    The following health maintenance items are reviewed in Epic and correct as of today:  Health Maintenance Due   Topic Date Due     ANNUAL REVIEW OF HM ORDERS  Never done     ADVANCE CARE PLANNING  Never done     HEPATITIS C SCREENING  Never done     LIPID  Never done     ZOSTER IMMUNIZATION (2 of 3) 04/29/2013     MEDICARE ANNUAL WELLNESS VISIT  Never done     Lab work is in process          Review of Systems  Constitutional, HEENT, cardiovascular, pulmonary, gi and gu systems are negative, except as  "otherwise noted.    OBJECTIVE:   There were no vitals taken for this visit. Estimated body mass index is 26.25 kg/m  as calculated from the following:    Height as of 9/24/21: 1.854 m (6' 1\").    Weight as of 9/24/21: 90.3 kg (199 lb).  Physical Exam  GENERAL: healthy, alert and no distress  EYES: Eyes grossly normal to inspection, PERRL and conjunctivae and sclerae normal  HENT: ear canals and TM's normal, nose and mouth without ulcers or lesions  NECK: no adenopathy, no asymmetry, masses, or scars and thyroid normal to palpation  RESP: lungs clear to auscultation - no rales, rhonchi or wheezes  CV: regular rate and rhythm, normal S1 S2, no S3 or S4, no murmur, click or rub, no peripheral edema and peripheral pulses strong  ABDOMEN: soft, nontender, no hepatosplenomegaly, no masses and bowel sounds normal  MS: no gross musculoskeletal defects noted, no edema  SKIN: no suspicious lesions or rashes  NEURO: Normal strength and tone, mentation intact and speech normal  PSYCH: mentation appears normal, affect normal/bright    Diagnostic Test Results:  Labs reviewed in Epic    ASSESSMENT / PLAN:       ICD-10-CM    1. Encounter for preventative adult health care examination  Z00.00 Comprehensive metabolic panel (BMP + Alb, Alk Phos, ALT, AST, Total. Bili, TP)     CBC with platelets and differential     Lipid panel reflex to direct LDL Fasting     PSA, screen     tamsulosin (FLOMAX) 0.4 MG capsule     Comprehensive metabolic panel (BMP + Alb, Alk Phos, ALT, AST, Total. Bili, TP)     CBC with platelets and differential     Lipid panel reflex to direct LDL Fasting     PSA, screen   2. Benign prostatic hyperplasia with urinary frequency  N40.1 tamsulosin (FLOMAX) 0.4 MG capsule    R35.0    3. Encounter for screening for malignant neoplasm of prostate   Z12.5 PSA, screen     PSA, screen   4. Bilateral leg edema  R60.0 BNP-N terminal pro     Echocardiogram Complete     BNP-N terminal pro   5. Unspecified abnormalities of " "breathing   R06.9 BNP-N terminal pro     BNP-N terminal pro     Uncertain whether or not the urinary urgency relates to benign prostatic hypertrophy and mild urinary retention or if it relates to overactive bladder.  Given his parkinsonism both are at higher likelihood.  We will start by utilizing a alpha-blocker.  If this is ineffective trial of a smooth muscle relaxant will be prescribed    Regarding the lower extremity edema.  There have been no changes in medications or diet.  He denies any significant chest discomfort.  He has a strong family history of coronary artery disease.  I would like to recheck his transthoracic echo to look at left ventricular performance and right-sided pressures/dynamics    Patient has been advised of split billing requirements and indicates understanding: Yes  COUNSELING:  Reviewed preventive health counseling, as reflected in patient instructions    Estimated body mass index is 26.25 kg/m  as calculated from the following:    Height as of 9/24/21: 1.854 m (6' 1\").    Weight as of 9/24/21: 90.3 kg (199 lb).        He reports that he has never smoked. He has never used smokeless tobacco.      Appropriate preventive services were discussed with this patient, including applicable screening as appropriate for cardiovascular disease, diabetes, osteopenia/osteoporosis, and glaucoma.  As appropriate for age/gender, discussed screening for colorectal cancer, prostate cancer, breast cancer, and cervical cancer. Checklist reviewing preventive services available has been given to the patient.    Reviewed patients plan of care and provided an AVS. The Basic Care Plan (routine screening as documented in Health Maintenance) for [place] meets the Care Plan requirement. This Care Plan has been established and reviewed with the Patient.    Counseling Resources:  ATP IV Guidelines  Pooled Cohorts Equation Calculator  Breast Cancer Risk Calculator  Breast Cancer: Medication to Reduce Risk  FRAX Risk " Assessment  ICSI Preventive Guidelines  Dietary Guidelines for Americans, 2010  USDA's MyPlate  ASA Prophylaxis  Lung CA Screening    Alexandru Huggins MD  [facility]    Identified Health Risks:

## 2021-12-18 LAB
CHOLEST SERPL-MCNC: 143 MG/DL
FASTING STATUS PATIENT QL REPORTED: NO
HDLC SERPL-MCNC: 40 MG/DL
LDLC SERPL CALC-MCNC: 91 MG/DL
NONHDLC SERPL-MCNC: 103 MG/DL
TRIGL SERPL-MCNC: 62 MG/DL

## 2021-12-20 ENCOUNTER — HOSPITAL ENCOUNTER (OUTPATIENT)
Dept: PHYSICAL THERAPY | Facility: CLINIC | Age: 71
Setting detail: THERAPIES SERIES
End: 2021-12-20
Payer: COMMERCIAL

## 2021-12-20 PROCEDURE — 97530 THERAPEUTIC ACTIVITIES: CPT | Mod: GP | Performed by: PHYSICAL THERAPIST

## 2021-12-20 PROCEDURE — 97110 THERAPEUTIC EXERCISES: CPT | Mod: GP | Performed by: PHYSICAL THERAPIST

## 2021-12-22 ENCOUNTER — HOSPITAL ENCOUNTER (OUTPATIENT)
Dept: PHYSICAL THERAPY | Facility: CLINIC | Age: 71
Setting detail: THERAPIES SERIES
End: 2021-12-22
Payer: COMMERCIAL

## 2021-12-22 PROCEDURE — 97110 THERAPEUTIC EXERCISES: CPT | Mod: GP | Performed by: PHYSICAL THERAPIST

## 2021-12-22 PROCEDURE — 97750 PHYSICAL PERFORMANCE TEST: CPT | Mod: GP | Performed by: PHYSICAL THERAPIST

## 2021-12-22 NOTE — PROGRESS NOTES
12/22/21 0800   Signing Clinician's Name / Credentials   Signing clinician's name / credentials Denice Aden, SPT / Edwin Lechuga, PT, DPT, NCS   Functional Gait Assessment (DINA Roper, LAWSON Aguayo., et al. (2004))   1. GAIT LEVEL SURFACE 3  (4.37 seconds)   2. CHANGE IN GAIT SPEED 3   3. GAIT WITH HORIZONTAL HEAD TURNS 2   4. GAIT WITH VERTICAL HEAD TURNS 3   5. GAIT AND PIVOT TURN 3   6. STEP OVER OBSTACLE 3   7. GAIT WITH NARROW BASE OF SUPPORT 0  (2 steps)   8. GAIT WITH EYES CLOSED 2  (8.31 seconds)   9. AMBULATING BACKWARDS 2  (9.50 seconds)   10. STEPS 2   Total Functional Gait Assessment Score   TOTAL SCORE: (MAXIMUM SCORE 30) 23     Functional Gait Assessment (FGA): The FGA assesses postural stability during various walking tasks.   Gait assistive device used: None    Patient Score: 23/30  Scores of <22/30 have been correlated with predicting falls in community-dwelling older adults according to Jacquelin & Heron 2010.   Scores of <18/30 have been correlated with increased risk for falls in patients with Parkinsons Disease according to Rony Skelton Zhou et al 2014.  Minimal Detectable Change for patients with acute/chronic stroke = 4.2 according to Thinba & Ritschel 2009  Minimal Detectable Change for patients with vestibular disorder = 8 according to Jacquelin & Heron 2010    Assessment (rationale for performing, application to patient s function & care plan): Completed to assess dynamic balance and risk for falls. Pt improved score from 21/30 to 23/30. Pt is no longer at increased risk for falls based on FGA score.    30-Second Sit to Stand Test:  The test is conducted with a straight back chair, without armrests, with a 17-inch seat height.    Patient Score (score =0 if must use arms) 14 reps    The 30 Second Sit to Stand Test is considered a test of fall risk.  Data from [place] APTA, cosponsored by [place] Dept of Health:  If must use arms = High Fall Risk regardless of reps  8 or less times = High  Fall Risk   9 to 12 times = Moderate Risk  13 or more times = Low Risk    The 30 Second Sit to Stand Test is also considered a test of leg strength and endurance.   Normative Data from Yandel et al,. 2001  Age                 Reps: Men        Reps: Women  60-64                14-19                       12-17                               65-69                12-18                       11-16                    70-74                12-17                       10-15              75-79                11-17                       10-15                    80-84                10-15                         9-14  85-89                 8-14                          8-13  90-94                 7-12                          4-11    Assessment (rationale for performing, application to patient s function & care plan): Completed to assess functional leg strength and endurance. Pt completed 14 reps this date, which is within age and gender norms.    Minutes billed as physical performance: 30

## 2021-12-22 NOTE — PROGRESS NOTES
"[facility]    Outpatient Physical Therapy Discharge Note  Patient: Steven Dye  : 1950    Beginning/End Dates of Reporting Period:  2021 to 2021    Referring Provider: Naima Fuller MD    Therapy Diagnosis: Bradykinesia, decreased functional mobility and balance     Client Self Report: Nothing new to report.    Objective Measurements:  Objective Measure: Bed Mobility  Details: 14.41 seconds on soft mattress    Objective Measure: FGA  Details:     Objective Measure: 30\" Chair Stand  Details: 14 reps from 17\" chair without UE A    Objective Measure: HEP  Details: Reinforced completing daily    Objective Measure: Car Transfers  Details: More efficient per pt report    Goals:  Goal Identifier Bed mobility   Goal Description Pt will complete bed mobility in <15 seconds for supine to sit   Target Date 21   Date Met  21   Progress (detail required for progress note): Goal met.     Goal Identifier FGA   Goal Description Pt will score >/=22/30 on the FGA in order to reduce fall risk   Target Date 21   Date Met  21   Progress (detail required for progress note): Goal met.  Improved from  to  during the course of therapy     Goal Identifier 30\" Chair Stand   Goal Description Pt will complete >/=15 stands in 30\" in order to demosntrates improved functional strength to ease transfers from low surfaces   Target Date 21   Date Met      Progress (detail required for progress note): Pt completes 14 reps this date. Although pt has not met goal of 15 reps, he has made significant improvements in functional leg strength. Pt scores within age and gender norms this date.     Goal Identifier HEP   Goal Description Pt will be independent with a HEP to self manage symptoms   Target Date 21   Date Met  21   Progress (detail required for progress " note): Goal met.     Goal Identifier Car transfer   Goal Description Pt will demonstrates reduced time and improved efficiency with car transfers in order to safely access community   Target Date 12/27/21   Date Met  12/22/21   Progress (detail required for progress note): Goal met.     Plan:  Discharge from therapy.    Discharge:    Reason for Discharge: Patient has has made significant improvements during the course of PT and has demonstrated improved efficiency with bed mobility and car transfers as well as improved balance and functional leg strength.    Equipment Issued: None    Discharge Plan: Patient to continue home program.  Due to the progressive nature of Parkinson's Disease, recommend patient follow up with PT in 9-12 months if he has experienced a change in his mobility.

## 2021-12-27 ENCOUNTER — ANCILLARY PROCEDURE (OUTPATIENT)
Dept: CARDIOLOGY | Facility: CLINIC | Age: 71
End: 2021-12-27
Attending: INTERNAL MEDICINE
Payer: COMMERCIAL

## 2021-12-27 DIAGNOSIS — I44.2 ATRIOVENTRICULAR BLOCK, COMPLETE (H): ICD-10-CM

## 2021-12-27 DIAGNOSIS — Z95.0 CARDIAC PACEMAKER IN SITU: ICD-10-CM

## 2021-12-27 PROCEDURE — 93296 REM INTERROG EVL PM/IDS: CPT | Performed by: INTERNAL MEDICINE

## 2021-12-27 PROCEDURE — 93294 REM INTERROG EVL PM/LDLS PM: CPT | Performed by: INTERNAL MEDICINE

## 2022-01-11 ENCOUNTER — HOSPITAL ENCOUNTER (OUTPATIENT)
Dept: CARDIOLOGY | Facility: CLINIC | Age: 72
Discharge: HOME | End: 2022-01-11
Attending: INTERNAL MEDICINE | Admitting: INTERNAL MEDICINE
Payer: COMMERCIAL

## 2022-01-11 DIAGNOSIS — N40.1 BENIGN PROSTATIC HYPERPLASIA WITH URINARY FREQUENCY: ICD-10-CM

## 2022-01-11 DIAGNOSIS — Z00.00 ENCOUNTER FOR PREVENTATIVE ADULT HEALTH CARE EXAMINATION: ICD-10-CM

## 2022-01-11 DIAGNOSIS — R35.0 BENIGN PROSTATIC HYPERPLASIA WITH URINARY FREQUENCY: ICD-10-CM

## 2022-01-11 DIAGNOSIS — R60.0 BILATERAL LEG EDEMA: ICD-10-CM

## 2022-01-11 LAB
LVEF ECHO: NORMAL
MDC_IDC_LEAD_IMPLANT_DT: NORMAL
MDC_IDC_LEAD_IMPLANT_DT: NORMAL
MDC_IDC_LEAD_LOCATION: NORMAL
MDC_IDC_LEAD_LOCATION: NORMAL
MDC_IDC_LEAD_LOCATION_DETAIL_1: NORMAL
MDC_IDC_LEAD_LOCATION_DETAIL_1: NORMAL
MDC_IDC_LEAD_MFG: NORMAL
MDC_IDC_LEAD_MFG: NORMAL
MDC_IDC_LEAD_MODEL: NORMAL
MDC_IDC_LEAD_MODEL: NORMAL
MDC_IDC_LEAD_POLARITY_TYPE: NORMAL
MDC_IDC_LEAD_POLARITY_TYPE: NORMAL
MDC_IDC_LEAD_SERIAL: NORMAL
MDC_IDC_LEAD_SERIAL: NORMAL
MDC_IDC_PG_IMPLANT_DTM: NORMAL
MDC_IDC_PG_MFG: NORMAL
MDC_IDC_PG_MODEL: NORMAL
MDC_IDC_PG_SERIAL: NORMAL
MDC_IDC_PG_TYPE: NORMAL
MDC_IDC_SESS_CLINIC_NAME: NORMAL
MDC_IDC_SESS_DTM: NORMAL
MDC_IDC_SESS_TYPE: NORMAL

## 2022-01-11 PROCEDURE — 999N000208 ECHOCARDIOGRAM COMPLETE

## 2022-01-11 PROCEDURE — 93306 TTE W/DOPPLER COMPLETE: CPT | Mod: 26 | Performed by: INTERNAL MEDICINE

## 2022-01-11 PROCEDURE — 255N000002 HC RX 255 OP 636: Performed by: INTERNAL MEDICINE

## 2022-01-11 RX ADMIN — HUMAN ALBUMIN MICROSPHERES AND PERFLUTREN 9 ML: 10; .22 INJECTION, SOLUTION INTRAVENOUS at 10:37

## 2022-01-12 RX ORDER — TAMSULOSIN HYDROCHLORIDE 0.4 MG/1
CAPSULE ORAL
Qty: 30 CAPSULE | Refills: 11 | OUTPATIENT
Start: 2022-01-12

## 2022-01-12 NOTE — TELEPHONE ENCOUNTER
tamsulosin (FLOMAX) 0.4 MG capsule 30 capsule 11 12/16/2021  --   Sig - Route: Take 1 capsule (0.4 mg) by mouth daily - Oral   Sent to pharmacy as: Tamsulosin HCl 0.4 MG Oral Capsule (FLOMAX)   Class: E-Prescribe   Order: [id number]   E-Prescribing Status: Receipt confirmed by pharmacy (12/16/2021  8:22 AM CST)       Printout Tracking    External Result Report     Pharmacy    [id number] - [place], [place] - [address]       Rx refused. Duplicate/early request. Pharmacy notified.  Cherrie NATHAN Triage RN  [facility]

## 2022-03-16 ENCOUNTER — OFFICE VISIT (OUTPATIENT)
Dept: FAMILY MEDICINE | Facility: CLINIC | Age: 72
End: 2022-03-16
Payer: COMMERCIAL

## 2022-03-16 VITALS
RESPIRATION RATE: 16 BRPM | OXYGEN SATURATION: 97 % | WEIGHT: 191 LBS | TEMPERATURE: 97.8 F | BODY MASS INDEX: 25.31 KG/M2 | DIASTOLIC BLOOD PRESSURE: 70 MMHG | SYSTOLIC BLOOD PRESSURE: 107 MMHG | HEIGHT: 73 IN | HEART RATE: 74 BPM

## 2022-03-16 DIAGNOSIS — Z01.818 PREOP GENERAL PHYSICAL EXAM: Primary | ICD-10-CM

## 2022-03-16 DIAGNOSIS — Q12.0 CONGENITAL CATARACT OF RIGHT EYE: ICD-10-CM

## 2022-03-16 LAB
BASOPHILS # BLD AUTO: 0 10E3/UL (ref 0–0.2)
BASOPHILS NFR BLD AUTO: 1 %
EOSINOPHIL # BLD AUTO: 0.1 10E3/UL (ref 0–0.7)
EOSINOPHIL NFR BLD AUTO: 2 %
ERYTHROCYTE [DISTWIDTH] IN BLOOD BY AUTOMATED COUNT: 13.7 % (ref 10–15)
HCT VFR BLD AUTO: 47.9 % (ref 40–53)
HGB BLD-MCNC: 15.9 G/DL (ref 13.3–17.7)
LYMPHOCYTES # BLD AUTO: 1.5 10E3/UL (ref 0.8–5.3)
LYMPHOCYTES NFR BLD AUTO: 25 %
MCH RBC QN AUTO: 30.4 PG (ref 26.5–33)
MCHC RBC AUTO-ENTMCNC: 33.2 G/DL (ref 31.5–36.5)
MCV RBC AUTO: 92 FL (ref 78–100)
MONOCYTES # BLD AUTO: 0.5 10E3/UL (ref 0–1.3)
MONOCYTES NFR BLD AUTO: 8 %
NEUTROPHILS # BLD AUTO: 3.9 10E3/UL (ref 1.6–8.3)
NEUTROPHILS NFR BLD AUTO: 64 %
PLATELET # BLD AUTO: 142 10E3/UL (ref 150–450)
RBC # BLD AUTO: 5.23 10E6/UL (ref 4.4–5.9)
WBC # BLD AUTO: 6.1 10E3/UL (ref 4–11)

## 2022-03-16 PROCEDURE — 99214 OFFICE O/P EST MOD 30 MIN: CPT | Performed by: INTERNAL MEDICINE

## 2022-03-16 PROCEDURE — 85025 COMPLETE CBC W/AUTO DIFF WBC: CPT | Performed by: INTERNAL MEDICINE

## 2022-03-16 PROCEDURE — 36415 COLL VENOUS BLD VENIPUNCTURE: CPT | Performed by: INTERNAL MEDICINE

## 2022-03-16 PROCEDURE — 80048 BASIC METABOLIC PNL TOTAL CA: CPT | Performed by: INTERNAL MEDICINE

## 2022-03-16 ASSESSMENT — PAIN SCALES - GENERAL: PAINLEVEL: NO PAIN (0)

## 2022-03-16 NOTE — PROGRESS NOTES
[address], [address]  [facility] [address]  Phone: [phone]  Primary Provider: Radha Starkey  Pre-op Performing Provider: RADHA STARKEY      PREOPERATIVE EVALUATION:  Today's date: 3/16/2022    Steven Dye is a 71 year old male who presents for a preoperative evaluation.    Surgical Information:  Surgery/Procedure: Cataract right eye  Surgery Location: [facility]  Surgeon: Isreal  Surgery Date: 3/18/22  Time of Surgery: TBD  Where patient plans to recover: At home with family  Fax number for surgical facility: [phone]    Type of Anesthesia Anticipated: Local    Assessment & Plan     The proposed surgical procedure is considered LOW risk.    Preop general physical exam  No contraindications noted to upcoming surgery.  No anesthesia sensitivities    Congenital cataract of right eye  Gradual worsening of symptomatology necessitating the above-mentioned procedure       Implanted Device:        Risks and Recommendations:  The patient has the following additional risks and recommendations for perioperative complications:   - No identified additional risk factors other than previously addressed    Patient will only take his Sinemet and blood pressure medications prior to the procedure.  He will hold his amlodipine and aspirin the morning of the procedure.  All of his medications will be taken with a small sip of water very early the morning of the procedure    RECOMMENDATION:  APPROVAL GIVEN to proceed with proposed procedure, without further diagnostic evaluation.      Subjective     HPI related to upcoming procedure: 71-year-old male with a history of a right cataract who presents clinic for preoperative assessment.  The patient's had gradual worsening of his vision and hence is having procedure performed.    He denies chest pain or shortness of breath.  No endocrine hematologic or allergic symptoms.  Denies any infectious symptomatology.  No  history of abnormal bleeding.  No history of anesthesia sensitivities    Preop Questions 3/9/2022   1. Have you ever had a heart attack or stroke? No   2. Have you ever had surgery on your heart or blood vessels, such as a stent placement, a coronary artery bypass, or surgery on an artery in your head, neck, heart, or legs? No   3. Do you have chest pain with activity? No   4. Do you have a history of  heart failure? No   5. Do you currently have a cold, bronchitis or symptoms of other infection? No   6. Do you have a cough, shortness of breath, or wheezing? No   7. Do you or anyone in your family have previous history of blood clots? No   8. Do you or does anyone in your family have a serious bleeding problem such as prolonged bleeding following surgeries or cuts? No   9. Have you ever had problems with anemia or been told to take iron pills? No   10. Have you had any abnormal blood loss such as black, tarry or bloody stools? No   11. Have you ever had a blood transfusion? No   12. Are you willing to have a blood transfusion if it is medically needed before, during, or after your surgery? Yes   13. Have you or any of your relatives ever had problems with anesthesia? No   14. Do you have sleep apnea, excessive snoring or daytime drowsiness? No   15. Do you have any artifical heart valves or other implanted medical devices like a pacemaker, defibrillator, or continuous glucose monitor? YES -    15a. What type of device do you have? Pacemaker   15b. Name of the clinic that manages your device:  [facility]   16. Do you have artificial joints? No   17. Are you allergic to latex? No       Health Care Directive:  Patient does not have a Health Care Directive or Living Will: Discussed advance care planning with patient; however, patient declined at this time.    Preoperative Review of :      Review of Systems  CONSTITUTIONAL: NEGATIVE for fever, chills, change in weight  ENT/MOUTH: NEGATIVE for  ear, mouth and throat problems  RESP: NEGATIVE for significant cough or SOB  CV: NEGATIVE for chest pain, palpitations or peripheral edema    Patient Active Problem List    Diagnosis Date Noted     RBBB      Priority: Medium     Complete AV block (H)      Priority: Medium     Hypertension      Priority: Medium     Parkinson disease (H)      Priority: Medium     Syncope 09/05/2014     Priority: Medium     Atrioventricular block, complete (HEART) 09/05/2014     Priority: Medium     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 Provider to review       HTN (hypertension) 09/05/2014     Priority: Medium     Cardiac pacemaker in situ 09/05/2014     Priority: Medium      Past Medical History:   Diagnosis Date     Complete AV block (H)     scanR dual chamber 4/1/2011     Hyperlipidemia      Hypertension      Parkinson disease (H)      Syncope      Past Surgical History:   Procedure Laterality Date     IMPLANT PACEMAKER       Current Outpatient Medications   Medication Sig Dispense Refill     amLODIPine (NORVASC) 5 MG tablet Take 1 tablet (5 mg) by mouth daily 90 tablet 3     aspirin 81 MG tablet Take 81 mg by mouth daily       carbidopa-levodopa (SINEMET)  MG per tablet Take 1 tablet by mouth 4 times daily       lisinopril-hydrochlorothiazide (ZESTORETIC) 10-12.5 MG tablet Take 1 tablet by mouth daily 90 tablet 3     metoprolol succinate ER (TOPROL XL) 50 MG 24 hr tablet Take 1 tablet (50 mg) by mouth daily 90 tablet 3     ROPINIROLE HCL PO Take by mouth 3 times daily       tamsulosin (FLOMAX) 0.4 MG capsule Take 1 capsule (0.4 mg) by mouth daily 30 capsule 11       Allergies   Allergen Reactions     Penicillins      hives     Statin Drugs [Hmg-Coa-R Inhibitors]      Penicillamine Rash        Social History     Tobacco Use     Smoking status: Never Smoker     Smokeless tobacco: Never Used   Substance Use Topics     Alcohol use: Yes      Comment: glass of wine occ     Family History   Problem Relation Age of Onset     C.A.D. Mother      C.A.D. Father      History   Drug Use Unknown         Objective     There were no vitals taken for this visit.    Physical Exam  GENERAL APPEARANCE: healthy, alert and no distress  HENT: ear canals and TM's normal and nose and mouth without ulcers or lesions  RESP: lungs clear to auscultation - no rales, rhonchi or wheezes  CV: regular rate and rhythm, normal S1 S2, no S3 or S4 and no murmur, click or rub   ABDOMEN: soft, nontender, no HSM or masses and bowel sounds normal  NEURO: Normal strength and tone, sensory exam grossly normal, mentation intact and speech normal    Recent Labs   Lab Test 12/16/21  0853 06/16/21  1526   HGB 15.8 15.4   * 132*    142   POTASSIUM 4.2 4.0   CR 0.98 0.92        Diagnostics:  Labs pending at this time.  Results will be reviewed when available.   No EKG required for low risk surgery (cataract, skin procedure, breast biopsy, etc).    Revised Cardiac Risk Index (RCRI):  The patient has the following serious cardiovascular risks for perioperative complications:   - No serious cardiac risks = 0 points     RCRI Interpretation: 0 points: Class I (very low risk - 0.4% complication rate)           Signed Electronically by: Alexandru Huggins MD  Copy of this evaluation report is provided to requesting physician.

## 2022-03-16 NOTE — LETTER
March 18, 2022      Tim Dye  [address] [address]        Dear ,    Your labs look good.  Your platelet counts are completely stable.    Resulted Orders   CBC with platelets and differential   Result Value Ref Range    WBC Count 6.1 4.0 - 11.0 10e3/uL    RBC Count 5.23 4.40 - 5.90 10e6/uL    Hemoglobin 15.9 13.3 - 17.7 g/dL    Hematocrit 47.9 40.0 - 53.0 %    MCV 92 78 - 100 fL    MCH 30.4 26.5 - 33.0 pg    MCHC 33.2 31.5 - 36.5 g/dL    RDW 13.7 10.0 - 15.0 %    Platelet Count 142 (L) 150 - 450 10e3/uL    % Neutrophils 64 %    % Lymphocytes 25 %    % Monocytes 8 %    % Eosinophils 2 %    % Basophils 1 %    Absolute Neutrophils 3.9 1.6 - 8.3 10e3/uL    Absolute Lymphocytes 1.5 0.8 - 5.3 10e3/uL    Absolute Monocytes 0.5 0.0 - 1.3 10e3/uL    Absolute Eosinophils 0.1 0.0 - 0.7 10e3/uL    Absolute Basophils 0.0 0.0 - 0.2 10e3/uL   Basic metabolic panel  (Ca, Cl, CO2, Creat, Gluc, K, Na, BUN)   Result Value Ref Range    Sodium 138 133 - 144 mmol/L    Potassium 4.3 3.4 - 5.3 mmol/L    Chloride 106 94 - 109 mmol/L    Carbon Dioxide (CO2) 23 20 - 32 mmol/L    Anion Gap 9 3 - 14 mmol/L    Urea Nitrogen 27 7 - 30 mg/dL    Creatinine 0.98 0.66 - 1.25 mg/dL    Calcium 9.1 8.5 - 10.1 mg/dL    Glucose 102 (H) 70 - 99 mg/dL    GFR Estimate 82 >60 mL/min/1.73m2      Comment:      Effective December 21, 2021 eGFRcr in adults is calculated using the 2021 CKD-EPI creatinine equation which includes age and gender (Janet et al., NEJM, DOI: 10.1056/HLTDeo1089904)       If you have any questions or concerns, please call the clinic at the number listed above.       Sincerely,      Alexandru Huggins MD

## 2022-03-16 NOTE — PATIENT INSTRUCTIONS
Tim;  The morning of your procedure please take your lisinopril, metoprolol, and Sinemet with a small sip of water.  You can hold your amlodipine and aspirin.    I see no contraindications to the upcoming procedure.    Best regards  Alexandru

## 2022-03-17 LAB
ANION GAP SERPL CALCULATED.3IONS-SCNC: 9 MMOL/L (ref 3–14)
BUN SERPL-MCNC: 27 MG/DL (ref 7–30)
CALCIUM SERPL-MCNC: 9.1 MG/DL (ref 8.5–10.1)
CHLORIDE BLD-SCNC: 106 MMOL/L (ref 94–109)
CO2 SERPL-SCNC: 23 MMOL/L (ref 20–32)
CREAT SERPL-MCNC: 0.98 MG/DL (ref 0.66–1.25)
GFR SERPL CREATININE-BSD FRML MDRD: 82 ML/MIN/1.73M2
GLUCOSE BLD-MCNC: 102 MG/DL (ref 70–99)
POTASSIUM BLD-SCNC: 4.3 MMOL/L (ref 3.4–5.3)
SODIUM SERPL-SCNC: 138 MMOL/L (ref 133–144)

## 2022-04-01 ENCOUNTER — ANCILLARY PROCEDURE (OUTPATIENT)
Dept: CARDIOLOGY | Facility: CLINIC | Age: 72
End: 2022-04-01
Attending: INTERNAL MEDICINE
Payer: COMMERCIAL

## 2022-04-01 DIAGNOSIS — I44.2 ATRIOVENTRICULAR BLOCK, COMPLETE (H): ICD-10-CM

## 2022-04-01 DIAGNOSIS — Z95.0 CARDIAC PACEMAKER IN SITU: ICD-10-CM

## 2022-04-01 PROCEDURE — 93280 PM DEVICE PROGR EVAL DUAL: CPT | Performed by: INTERNAL MEDICINE

## 2022-04-05 LAB
MDC_IDC_LEAD_IMPLANT_DT: NORMAL
MDC_IDC_LEAD_IMPLANT_DT: NORMAL
MDC_IDC_LEAD_LOCATION: NORMAL
MDC_IDC_LEAD_LOCATION: NORMAL
MDC_IDC_LEAD_LOCATION_DETAIL_1: NORMAL
MDC_IDC_LEAD_LOCATION_DETAIL_1: NORMAL
MDC_IDC_LEAD_MFG: NORMAL
MDC_IDC_LEAD_MFG: NORMAL
MDC_IDC_LEAD_MODEL: NORMAL
MDC_IDC_LEAD_MODEL: NORMAL
MDC_IDC_LEAD_POLARITY_TYPE: NORMAL
MDC_IDC_LEAD_POLARITY_TYPE: NORMAL
MDC_IDC_LEAD_SERIAL: NORMAL
MDC_IDC_LEAD_SERIAL: NORMAL
MDC_IDC_MSMT_BATTERY_STATUS: NORMAL
MDC_IDC_MSMT_LEADCHNL_RA_IMPEDANCE_VALUE: 730 OHM
MDC_IDC_MSMT_LEADCHNL_RA_PACING_THRESHOLD_AMPLITUDE: 0.7 V
MDC_IDC_MSMT_LEADCHNL_RA_PACING_THRESHOLD_PULSEWIDTH: 0.5 MS
MDC_IDC_MSMT_LEADCHNL_RV_IMPEDANCE_VALUE: 730 OHM
MDC_IDC_MSMT_LEADCHNL_RV_PACING_THRESHOLD_AMPLITUDE: 0.8 V
MDC_IDC_MSMT_LEADCHNL_RV_PACING_THRESHOLD_PULSEWIDTH: 0.4 MS
MDC_IDC_PG_IMPLANT_DTM: NORMAL
MDC_IDC_PG_MFG: NORMAL
MDC_IDC_PG_MODEL: NORMAL
MDC_IDC_PG_SERIAL: NORMAL
MDC_IDC_PG_TYPE: NORMAL
MDC_IDC_SESS_CLINIC_NAME: NORMAL
MDC_IDC_SESS_DTM: NORMAL
MDC_IDC_SESS_TYPE: NORMAL
MDC_IDC_SET_BRADY_AT_MODE_SWITCH_MODE: NORMAL
MDC_IDC_SET_BRADY_AT_MODE_SWITCH_RATE: 170 {BEATS}/MIN
MDC_IDC_SET_BRADY_HYSTRATE: NORMAL
MDC_IDC_SET_BRADY_LOWRATE: 60 {BEATS}/MIN
MDC_IDC_SET_BRADY_MAX_SENSOR_RATE: 130 {BEATS}/MIN
MDC_IDC_SET_BRADY_MAX_TRACKING_RATE: 130 {BEATS}/MIN
MDC_IDC_SET_BRADY_MODE: NORMAL
MDC_IDC_SET_BRADY_PAV_DELAY_HIGH: 180 MS
MDC_IDC_SET_BRADY_PAV_DELAY_LOW: 250 MS
MDC_IDC_SET_BRADY_SAV_DELAY_HIGH: 180 MS
MDC_IDC_SET_BRADY_SAV_DELAY_LOW: 250 MS
MDC_IDC_SET_LEADCHNL_RA_PACING_AMPLITUDE: 2 V
MDC_IDC_SET_LEADCHNL_RA_PACING_CAPTURE_MODE: NORMAL
MDC_IDC_SET_LEADCHNL_RA_PACING_POLARITY: NORMAL
MDC_IDC_SET_LEADCHNL_RA_PACING_PULSEWIDTH: 0.5 MS
MDC_IDC_SET_LEADCHNL_RA_SENSING_ADAPTATION_MODE: NORMAL
MDC_IDC_SET_LEADCHNL_RA_SENSING_POLARITY: NORMAL
MDC_IDC_SET_LEADCHNL_RA_SENSING_SENSITIVITY: 0.5 MV
MDC_IDC_SET_LEADCHNL_RV_PACING_AMPLITUDE: 1.4 V
MDC_IDC_SET_LEADCHNL_RV_PACING_CAPTURE_MODE: NORMAL
MDC_IDC_SET_LEADCHNL_RV_PACING_POLARITY: NORMAL
MDC_IDC_SET_LEADCHNL_RV_PACING_PULSEWIDTH: 0.4 MS
MDC_IDC_SET_LEADCHNL_RV_SENSING_ADAPTATION_MODE: NORMAL
MDC_IDC_SET_LEADCHNL_RV_SENSING_POLARITY: NORMAL
MDC_IDC_SET_LEADCHNL_RV_SENSING_SENSITIVITY: 2.5 MV
MDC_IDC_STAT_AT_DTM_END: NORMAL
MDC_IDC_STAT_AT_DTM_START: NORMAL
MDC_IDC_STAT_AT_MODE_SW_COUNT: 0
MDC_IDC_STAT_AT_MODE_SW_MAX_DURATION: 0 S
MDC_IDC_STAT_BRADY_DTM_END: NORMAL
MDC_IDC_STAT_BRADY_DTM_START: NORMAL
MDC_IDC_STAT_BRADY_RA_PERCENT_PACED: 22 %
MDC_IDC_STAT_BRADY_RV_PERCENT_PACED: 100 %
MDC_IDC_STAT_EPISODE_RECENT_COUNT: 0
MDC_IDC_STAT_EPISODE_RECENT_COUNT_DTM_END: NORMAL
MDC_IDC_STAT_EPISODE_RECENT_COUNT_DTM_START: NORMAL
MDC_IDC_STAT_EPISODE_TYPE: NORMAL

## 2022-07-08 ENCOUNTER — ANCILLARY PROCEDURE (OUTPATIENT)
Dept: CARDIOLOGY | Facility: CLINIC | Age: 72
End: 2022-07-08
Attending: INTERNAL MEDICINE
Payer: COMMERCIAL

## 2022-07-08 DIAGNOSIS — Z95.0 CARDIAC PACEMAKER IN SITU: ICD-10-CM

## 2022-07-08 DIAGNOSIS — I44.2 ATRIOVENTRICULAR BLOCK, COMPLETE (H): Primary | ICD-10-CM

## 2022-07-08 DIAGNOSIS — I44.2 ATRIOVENTRICULAR BLOCK, COMPLETE (H): ICD-10-CM

## 2022-07-08 PROCEDURE — 93293 PM PHONE R-STRIP DEVICE EVAL: CPT | Performed by: INTERNAL MEDICINE

## 2022-10-05 NOTE — PROGRESS NOTES
Steven Dye is a 71 year old male who is being evaluated via a billable video visit.      How would you like to obtain your AVS? MyChart     CC:  PPM    BRIEF HPI:  Tim is a 71 year old yo M who sees Dr. Cervantes for h/o:    1. 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 HTN - now getting some orthostasis  3. HL   4. Parkinson's - sees Dr. Fuller @ [facility], last visit 2/2022      I saw Tim 8/2021 at which time he continued to walk at the mall 2-3x/week, without any concerns. Annual follow-up planned.      His PCP rec'd an echo as he noted intermittent LE edema. This was done 1/2022 and looked great, with nl valves and LVEF.    He contacted us 2/2022 noting he'd had a fall a/w orthostatic hypotension at Neurologist office. BP med changes were recommended.    INTERVAL HISTORY:  Overall, Tim and Melissa think that he is doing well from a cardiac standpoint.  No chest pain, pressure or tightness.  His balance has been worse, and he is not walking at the mall very much.    Has had a few falls over the last year related to standing quickly after sitting for a prolonged period of time.  He does not wear compression stockings.    SBPs at home are consistently 110s/60s and he thinks the medications are working well.      He continues to be bothered by LE edema.  He was glad to hear his echo 1/2022 showed no significant abnormalities.  No orthopnea or PND.      DIAGNOSTICS:  Device interrogation 10/6/2022 showed 27% AP and 100%  in DDDR 60/130. Good variability in histogram, 60-80s. No atrial or ventricular arrhythmias  Echo 1/2022 LVEF 50-55%. Grade I DD. No RWMA. Nl RV.Nl valves and nl RVSP  EKG 9/2021 showed ASVP  Echocardiogam 9/2019 showed EF 50-55%. Mild LVH. Nl RV. 1+ TR. RVSP wnl 21mmHg. Borderline dilated root and ascending aorta    REVIEW OF SYSTEMS:  Negative with the exception of that noted above    PHYSICAL EXAM:  There were no vitals taken for this visit.  There is no height  or weight on file to calculate BMI.     Physical Exam  GENERAL: Healthy, alert and no distress  EYES: Eyes grossly normal to inspection.  No discharge or erythema, or obvious scleral/conjunctival abnormalities.  RESP: No audible wheeze, cough, or visible cyanosis.  No visible retractions or increased work of breathing.    SKIN: Visible skin clear. No significant rash, abnormal pigmentation or lesions.  NEURO: Cranial nerves grossly intact.  Mentation and speech appropriate for age.  PSYCH: Mentation appears normal, affect normal/bright, judgement and insight intact, normal speech and appearance well-groomed.      PLAN:  1. Decrease amlodipine to 2.5 mg daily.  If SBPs remain <130mmHg, will stop  2. Annual follow-up with annual in-office device check    ASSESSMENT/PLAN:    1. Advanced Heart Block/Syncope    S/p PPM as above    PPM check 10/6 looked good.     PLAN:    Continue routine device follow-up      2. HTN, now with orthostasis    He does not check his BP at home, but when he is been seen in multiple clinic visits, it is typically 1110s/60s on current medications    Melissa and Tim note his falls in the past have been related to standing quickly     PLAN:    Given he also notes LE edema, will reduce amlodipine 2.5 mg daily.  In 2 weeks, if SBP's remain <130s, will stop this.  I am hoping that this will help both his symptoms when standing quickly and LE edema    Lisinopril/HCTZ and metoprolol both refilled        CURRENT MEDICATIONS:  Current Outpatient Medications   Medication Sig Dispense Refill     amLODIPine (NORVASC) 5 MG tablet Take 0.5 tablets (2.5 mg) by mouth daily       aspirin 81 MG tablet Take 81 mg by mouth daily       carbidopa-levodopa (SINEMET)  MG per tablet Take 1 tablet by mouth 4 times daily       lisinopril-hydrochlorothiazide (ZESTORETIC) 10-12.5 MG tablet Take 1 tablet by mouth daily 90 tablet 3     metoprolol succinate ER (TOPROL XL) 50 MG 24 hr tablet Take 1 tablet (50 mg) by mouth  daily 90 tablet 3         ORDERS PLACED:  Orders Placed This Encounter   Procedures     Follow-Up with Cardiology BERTA     Orders Placed This Encounter   Medications     lisinopril-hydrochlorothiazide (ZESTORETIC) 10-12.5 MG tablet     Sig: Take 1 tablet by mouth daily     Dispense:  90 tablet     Refill:  3     metoprolol succinate ER (TOPROL XL) 50 MG 24 hr tablet     Sig: Take 1 tablet (50 mg) by mouth daily     Dispense:  90 tablet     Refill:  3     amLODIPine (NORVASC) 5 MG tablet     Sig: Take 0.5 tablets (2.5 mg) by mouth daily     Medications Discontinued During This Encounter   Medication Reason     tamsulosin (FLOMAX) 0.4 MG capsule Stopped by Patient     lisinopril-hydrochlorothiazide (ZESTORETIC) 10-12.5 MG tablet Reorder     metoprolol succinate ER (TOPROL XL) 50 MG 24 hr tablet Reorder     amLODIPine (NORVASC) 5 MG tablet          Encounter Diagnoses   Name Primary?     Atrioventricular block, complete (H) Yes     Essential hypertension with goal blood pressure less than 140/90      Cardiac pacemaker in situ          ALLERGIES     Allergies   Allergen Reactions     Penicillins      hives     Statin Drugs [Hmg-Coa-R Inhibitors]      Penicillamine Rash       PAST MEDICAL HISTORY:  Past Medical History:   Diagnosis Date     Complete AV block (H)     [employer] dual chamber 4/1/2011     Hyperlipidemia      Hypertension      Parkinson disease (H)      Syncope        PAST SURGICAL HISTORY:  Past Surgical History:   Procedure Laterality Date     IMPLANT PACEMAKER         FAMILY HISTORY:  Family History   Problem Relation Age of Onset     C.A.D. Mother      C.A.D. Father        SOCIAL HISTORY:  Social History     Socioeconomic History     Marital status:      Spouse name: None     Number of children: None     Years of education: None     Highest education level: None   Tobacco Use     Smoking status: Never Smoker     Smokeless tobacco: Never Used   Substance and Sexual Activity     Alcohol use:  Yes     Comment: glass of wine occ     Drug use: Not Currently   Other Topics Concern     Special Diet No     Exercise Yes     Comment: walking almost everyday       Video-Visit Details    Type of service:  Video Visit    Video Start Time: 0859  Video End Time:  0913  Duration: 14 minutes    Originating Location (pt. Location): Home    Distant Location (provider location):  Home Office    Platform used for Video Visit: Dwayne Casarez PA-C [employer]

## 2022-10-06 ENCOUNTER — ANCILLARY PROCEDURE (OUTPATIENT)
Dept: CARDIOLOGY | Facility: CLINIC | Age: 72
End: 2022-10-06
Attending: INTERNAL MEDICINE
Payer: COMMERCIAL

## 2022-10-06 DIAGNOSIS — I44.2 ATRIOVENTRICULAR BLOCK, COMPLETE (H): ICD-10-CM

## 2022-10-06 DIAGNOSIS — Z95.0 CARDIAC PACEMAKER IN SITU: ICD-10-CM

## 2022-10-06 LAB
MDC_IDC_LEAD_IMPLANT_DT: NORMAL
MDC_IDC_LEAD_IMPLANT_DT: NORMAL
MDC_IDC_LEAD_LOCATION: NORMAL
MDC_IDC_LEAD_LOCATION: NORMAL
MDC_IDC_LEAD_LOCATION_DETAIL_1: NORMAL
MDC_IDC_LEAD_LOCATION_DETAIL_1: NORMAL
MDC_IDC_LEAD_MFG: NORMAL
MDC_IDC_LEAD_MFG: NORMAL
MDC_IDC_LEAD_MODEL: NORMAL
MDC_IDC_LEAD_MODEL: NORMAL
MDC_IDC_LEAD_POLARITY_TYPE: NORMAL
MDC_IDC_LEAD_POLARITY_TYPE: NORMAL
MDC_IDC_LEAD_SERIAL: NORMAL
MDC_IDC_LEAD_SERIAL: NORMAL
MDC_IDC_MSMT_BATTERY_STATUS: NORMAL
MDC_IDC_MSMT_LEADCHNL_RA_IMPEDANCE_VALUE: 730 OHM
MDC_IDC_MSMT_LEADCHNL_RA_PACING_THRESHOLD_AMPLITUDE: 0.7 V
MDC_IDC_MSMT_LEADCHNL_RA_PACING_THRESHOLD_PULSEWIDTH: 0.5 MS
MDC_IDC_MSMT_LEADCHNL_RA_SENSING_INTR_AMPL: 3 MV
MDC_IDC_MSMT_LEADCHNL_RV_IMPEDANCE_VALUE: 700 OHM
MDC_IDC_MSMT_LEADCHNL_RV_PACING_THRESHOLD_AMPLITUDE: 0.9 V
MDC_IDC_MSMT_LEADCHNL_RV_PACING_THRESHOLD_PULSEWIDTH: 0.4 MS
MDC_IDC_PG_IMPLANT_DTM: NORMAL
MDC_IDC_PG_MFG: NORMAL
MDC_IDC_PG_MODEL: NORMAL
MDC_IDC_PG_SERIAL: NORMAL
MDC_IDC_PG_TYPE: NORMAL
MDC_IDC_SESS_CLINIC_NAME: NORMAL
MDC_IDC_SESS_DTM: NORMAL
MDC_IDC_SESS_TYPE: NORMAL
MDC_IDC_SET_BRADY_AT_MODE_SWITCH_MODE: NORMAL
MDC_IDC_SET_BRADY_AT_MODE_SWITCH_RATE: 170 {BEATS}/MIN
MDC_IDC_SET_BRADY_HYSTRATE: NORMAL
MDC_IDC_SET_BRADY_LOWRATE: 60 {BEATS}/MIN
MDC_IDC_SET_BRADY_MAX_SENSOR_RATE: 130 {BEATS}/MIN
MDC_IDC_SET_BRADY_MAX_TRACKING_RATE: 130 {BEATS}/MIN
MDC_IDC_SET_BRADY_MODE: NORMAL
MDC_IDC_SET_BRADY_PAV_DELAY_HIGH: 180 MS
MDC_IDC_SET_BRADY_PAV_DELAY_LOW: 250 MS
MDC_IDC_SET_BRADY_SAV_DELAY_HIGH: 180 MS
MDC_IDC_SET_BRADY_SAV_DELAY_LOW: 250 MS
MDC_IDC_SET_LEADCHNL_RA_PACING_AMPLITUDE: 2 V
MDC_IDC_SET_LEADCHNL_RA_PACING_CAPTURE_MODE: NORMAL
MDC_IDC_SET_LEADCHNL_RA_PACING_POLARITY: NORMAL
MDC_IDC_SET_LEADCHNL_RA_PACING_PULSEWIDTH: 0.5 MS
MDC_IDC_SET_LEADCHNL_RA_SENSING_ADAPTATION_MODE: NORMAL
MDC_IDC_SET_LEADCHNL_RA_SENSING_POLARITY: NORMAL
MDC_IDC_SET_LEADCHNL_RA_SENSING_SENSITIVITY: 0.5 MV
MDC_IDC_SET_LEADCHNL_RV_PACING_AMPLITUDE: 1.3 V
MDC_IDC_SET_LEADCHNL_RV_PACING_CAPTURE_MODE: NORMAL
MDC_IDC_SET_LEADCHNL_RV_PACING_POLARITY: NORMAL
MDC_IDC_SET_LEADCHNL_RV_PACING_PULSEWIDTH: 0.4 MS
MDC_IDC_SET_LEADCHNL_RV_SENSING_ADAPTATION_MODE: NORMAL
MDC_IDC_SET_LEADCHNL_RV_SENSING_POLARITY: NORMAL
MDC_IDC_SET_LEADCHNL_RV_SENSING_SENSITIVITY: 2.5 MV
MDC_IDC_STAT_AT_DTM_END: NORMAL
MDC_IDC_STAT_AT_DTM_START: NORMAL
MDC_IDC_STAT_AT_MODE_SW_COUNT: 0
MDC_IDC_STAT_AT_MODE_SW_MAX_DURATION: 0 S
MDC_IDC_STAT_BRADY_DTM_END: NORMAL
MDC_IDC_STAT_BRADY_DTM_START: NORMAL
MDC_IDC_STAT_BRADY_RA_PERCENT_PACED: 27 %
MDC_IDC_STAT_BRADY_RV_PERCENT_PACED: 100 %
MDC_IDC_STAT_EPISODE_RECENT_COUNT: 0
MDC_IDC_STAT_EPISODE_RECENT_COUNT_DTM_END: NORMAL
MDC_IDC_STAT_EPISODE_RECENT_COUNT_DTM_START: NORMAL
MDC_IDC_STAT_EPISODE_TYPE: NORMAL

## 2022-10-06 PROCEDURE — 93280 PM DEVICE PROGR EVAL DUAL: CPT | Performed by: INTERNAL MEDICINE

## 2022-10-07 ENCOUNTER — VIRTUAL VISIT (OUTPATIENT)
Dept: CARDIOLOGY | Facility: CLINIC | Age: 72
End: 2022-10-07
Payer: COMMERCIAL

## 2022-10-07 DIAGNOSIS — I10 ESSENTIAL HYPERTENSION WITH GOAL BLOOD PRESSURE LESS THAN 140/90: ICD-10-CM

## 2022-10-07 DIAGNOSIS — Z95.0 CARDIAC PACEMAKER IN SITU: ICD-10-CM

## 2022-10-07 DIAGNOSIS — I44.2 ATRIOVENTRICULAR BLOCK, COMPLETE (H): Primary | ICD-10-CM

## 2022-10-07 PROCEDURE — 99214 OFFICE O/P EST MOD 30 MIN: CPT | Mod: 95 | Performed by: PHYSICIAN ASSISTANT

## 2022-10-07 RX ORDER — AMLODIPINE BESYLATE 5 MG/1
2.5 TABLET ORAL DAILY
Start: 2022-10-07 | End: 2022-12-01

## 2022-10-07 RX ORDER — LISINOPRIL/HYDROCHLOROTHIAZIDE 10-12.5 MG
1 TABLET ORAL DAILY
Qty: 90 TABLET | Refills: 3 | Status: SHIPPED | OUTPATIENT
Start: 2022-10-07 | End: 2023-10-30

## 2022-10-07 RX ORDER — METOPROLOL SUCCINATE 50 MG/1
50 TABLET, EXTENDED RELEASE ORAL DAILY
Qty: 90 TABLET | Refills: 3 | Status: SHIPPED | OUTPATIENT
Start: 2022-10-07 | End: 2023-10-30

## 2022-10-07 NOTE — LETTER
10/7/2022    Alexandru Huggins MD  [address]  [place] [address]    RE: Steven Dye       Dear Colleague,     I had the pleasure of seeing Steven Dye in the [facility].  Steven Dye is a 71 year old male who is being evaluated via a billable video visit.      How would you like to obtain your AVS? MyChart     CC:  PPM    BRIEF HPI:  Tim is a 71 year old yo M who sees Dr. Cervantes for h/o:    1. Advanced HB with syncope 2011 - s/p [employer] PPM 4/2011  2. HTN - now getting some orthostasis  3. HL   4. Parkinson's - sees Dr. Fuller @ [facility], last visit 2/2022      I saw Tim 8/2021 at which time he continued to walk at the mall 2-3x/week, without any concerns. Annual follow-up planned.      His PCP rec'd an echo as he noted intermittent LE edema. This was done 1/2022 and looked great, with nl valves and LVEF.    He contacted us 2/2022 noting he'd had a fall a/w orthostatic hypotension at Neurologist office. BP med changes were recommended.    INTERVAL HISTORY:  Overall, Tim and Melissa think that he is doing well from a cardiac standpoint.  No chest pain, pressure or tightness.  His balance has been worse, and he is not walking at the mall very much.    Has had a few falls over the last year related to standing quickly after sitting for a prolonged period of time.  He does not wear compression stockings.    SBPs at home are consistently 110s/60s and he thinks the medications are working well.      He continues to be bothered by LE edema.  He was glad to hear his echo 1/2022 showed no significant abnormalities.  No orthopnea or PND.      DIAGNOSTICS:  Device interrogation 10/6/2022 showed 27% AP and 100%  in DDDR 60/130. Good variability in histogram, 60-80s. No atrial or ventricular arrhythmias  Echo 1/2022 LVEF 50-55%. Grade I DD. No RWMA. Nl RV.Nl valves and nl RVSP  EKG 9/2021 showed ASVP  Echocardiogam 9/2019 showed EF 50-55%. Mild LVH. Nl RV.  1+ TR. RVSP wnl 21mmHg. Borderline dilated root and ascending aorta    REVIEW OF SYSTEMS:  Negative with the exception of that noted above    PHYSICAL EXAM:  There were no vitals taken for this visit.  There is no height or weight on file to calculate BMI.     Physical Exam  GENERAL: Healthy, alert and no distress  EYES: Eyes grossly normal to inspection.  No discharge or erythema, or obvious scleral/conjunctival abnormalities.  RESP: No audible wheeze, cough, or visible cyanosis.  No visible retractions or increased work of breathing.    SKIN: Visible skin clear. No significant rash, abnormal pigmentation or lesions.  NEURO: Cranial nerves grossly intact.  Mentation and speech appropriate for age.  PSYCH: Mentation appears normal, affect normal/bright, judgement and insight intact, normal speech and appearance well-groomed.      PLAN:  1. Decrease amlodipine to 2.5 mg daily.  If SBPs remain <130mmHg, will stop  2. Annual follow-up with annual in-office device check    ASSESSMENT/PLAN:    1. Advanced Heart Block/Syncope    S/p PPM as above    PPM check 10/6 looked good.     PLAN:    Continue routine device follow-up      2. HTN, now with orthostasis    He does not check his BP at home, but when he is been seen in multiple clinic visits, it is typically 1110s/60s on current medications    Melissa and Tim note his falls in the past have been related to standing quickly     PLAN:    Given he also notes LE edema, will reduce amlodipine 2.5 mg daily.  In 2 weeks, if SBP's remain <130s, will stop this.  I am hoping that this will help both his symptoms when standing quickly and LE edema    Lisinopril/HCTZ and metoprolol both refilled        CURRENT MEDICATIONS:  Current Outpatient Medications   Medication Sig Dispense Refill     amLODIPine (NORVASC) 5 MG tablet Take 0.5 tablets (2.5 mg) by mouth daily       aspirin 81 MG tablet Take 81 mg by mouth daily       carbidopa-levodopa (SINEMET)  MG per tablet Take 1 tablet by  mouth 4 times daily       lisinopril-hydrochlorothiazide (ZESTORETIC) 10-12.5 MG tablet Take 1 tablet by mouth daily 90 tablet 3     metoprolol succinate ER (TOPROL XL) 50 MG 24 hr tablet Take 1 tablet (50 mg) by mouth daily 90 tablet 3         ORDERS PLACED:  Orders Placed This Encounter   Procedures     Follow-Up with Cardiology BERTA     Orders Placed This Encounter   Medications     lisinopril-hydrochlorothiazide (ZESTORETIC) 10-12.5 MG tablet     Sig: Take 1 tablet by mouth daily     Dispense:  90 tablet     Refill:  3     metoprolol succinate ER (TOPROL XL) 50 MG 24 hr tablet     Sig: Take 1 tablet (50 mg) by mouth daily     Dispense:  90 tablet     Refill:  3     amLODIPine (NORVASC) 5 MG tablet     Sig: Take 0.5 tablets (2.5 mg) by mouth daily     Medications Discontinued During This Encounter   Medication Reason     tamsulosin (FLOMAX) 0.4 MG capsule Stopped by Patient     lisinopril-hydrochlorothiazide (ZESTORETIC) 10-12.5 MG tablet Reorder     metoprolol succinate ER (TOPROL XL) 50 MG 24 hr tablet Reorder     amLODIPine (NORVASC) 5 MG tablet          Encounter Diagnoses   Name Primary?     Atrioventricular block, complete (H) Yes     Essential hypertension with goal blood pressure less than 140/90      Cardiac pacemaker in situ          ALLERGIES     Allergies   Allergen Reactions     Penicillins      hives     Statin Drugs [Hmg-Coa-R Inhibitors]      Penicillamine Rash       PAST MEDICAL HISTORY:  Past Medical History:   Diagnosis Date     Complete AV block (H)     [employer] dual chamber 4/1/2011     Hyperlipidemia      Hypertension      Parkinson disease (H)      Syncope        PAST SURGICAL HISTORY:  Past Surgical History:   Procedure Laterality Date     IMPLANT PACEMAKER         FAMILY HISTORY:  Family History   Problem Relation Age of Onset     C.A.D. Mother      C.A.D. Father        SOCIAL HISTORY:  Social History     Socioeconomic History     Marital status:      Spouse name: None      Number of children: None     Years of education: None     Highest education level: None   Tobacco Use     Smoking status: Never Smoker     Smokeless tobacco: Never Used   Substance and Sexual Activity     Alcohol use: Yes     Comment: glass of wine occ     Drug use: Not Currently   Other Topics Concern     Special Diet No     Exercise Yes     Comment: walking almost everyday       Video-Visit Details    Type of service:  Video Visit    Video Start Time: 0859  Video End Time:  0913  Duration: 14 minutes    Originating Location (pt. Location): Home    Distant Location (provider location):  Home Office    Platform used for Video Visit: [facility]    Janki Casarez PA-C hospitals     Thank you for allowing me to participate in the care of your patient.    Sincerely,   Janki Casarez PA-C   [facility]  cc:   Janki Casarez PA-C  [address] [place] [id number]

## 2022-10-07 NOTE — PATIENT INSTRUCTIONS
Angelic - it was nice to speak with you today!  Thanks for your flexibility in making this a virtual visit.    Your pacemaker check earlier this week looked great  Reviewed that he has not been walking as much due to balance issues, but no chest discomfort, shortness of breath with the activity you do participate in  Reviewed that you continue to note swelling in the legs.  Echocardiogram done by Dr. Huggins 1/2022 looked okay     PLAN:  As I think your lightheadedness with standing and some of your swelling may be due to medication, please reduce amlodipine to 1/2 tablet daily (2.5 mg) for the next 2 weeks  Get BP cuff (OMRON is a common brand) - arm cuff if you can and start checking BP daily (any time of day at least an hour after you take BP meds is fine)  In ~2 weeks (late October), CALL or send [employer] message to let me know what BPs are running.  If they are still <130mmHg, we will plan to stop amlodipine entirely.  I am hopeful that this helps your lightheadedness with standing and helps your swelling in the legs.  [phone]  I sent refills on the lisinopril/HCTZ and metoprolol  See us back with annual device interrogation 10/2023.  Call beforehand if any concerns or questions! [phone])

## 2022-11-10 ENCOUNTER — ANCILLARY PROCEDURE (OUTPATIENT)
Dept: CARDIOLOGY | Facility: CLINIC | Age: 72
End: 2022-11-10
Attending: INTERNAL MEDICINE
Payer: COMMERCIAL

## 2022-11-10 DIAGNOSIS — Z95.0 CARDIAC PACEMAKER IN SITU: ICD-10-CM

## 2022-11-10 DIAGNOSIS — I44.2 ATRIOVENTRICULAR BLOCK, COMPLETE (H): ICD-10-CM

## 2022-11-10 PROCEDURE — 93293 PM PHONE R-STRIP DEVICE EVAL: CPT | Performed by: INTERNAL MEDICINE

## 2022-11-17 DIAGNOSIS — I10 ESSENTIAL HYPERTENSION WITH GOAL BLOOD PRESSURE LESS THAN 140/90: ICD-10-CM

## 2022-11-17 RX ORDER — AMLODIPINE BESYLATE 5 MG/1
2.5 TABLET ORAL DAILY
Status: CANCELLED | OUTPATIENT
Start: 2022-11-17

## 2022-11-17 NOTE — TELEPHONE ENCOUNTER
[facility] Cardiology Refill Guideline reviewed.  Medication does not meet criteria for refill due to see Oumou's last note ? discontinue.  Messaged to providers team for further review.

## 2022-11-18 NOTE — TELEPHONE ENCOUNTER
Spoke to pt who has not taken his BP, but bought a new cuff yesterday.  Pt states that his ankles are now back to normal.  Pt states that he has been getting up more carefully, do no falls or lightheadedness. Pt will take his bp after he unpacks his cuff and take a bp and call it in for Shireen Carpio

## 2022-11-30 ENCOUNTER — MYC REFILL (OUTPATIENT)
Dept: CARDIOLOGY | Facility: CLINIC | Age: 72
End: 2022-11-30

## 2022-11-30 DIAGNOSIS — I10 ESSENTIAL HYPERTENSION WITH GOAL BLOOD PRESSURE LESS THAN 140/90: ICD-10-CM

## 2022-11-30 RX ORDER — AMLODIPINE BESYLATE 5 MG/1
2.5 TABLET ORAL DAILY
Status: CANCELLED | OUTPATIENT
Start: 2022-11-30

## 2022-12-01 NOTE — TELEPHONE ENCOUNTER
Pls call Tim and let him know I think we should stop amlodipine 2.5 mg.  I had to reduce this to 2.5 back in 10/2022 d/t orthostasis.  Now that his BP remains <130 mmHg the vast majority of the time, I do recommend he be on this.  Please let him know and update epic med list    Please tell him to continue checking BP.  If he sees BP climbing and majority of BPs are >130mmHg, would have him restart it, but he should let us know.      Right now he is due to see us 10/2023.  Call us [employer] message if any issues earlier  Thanks-Oumou

## 2022-12-02 ENCOUNTER — OFFICE VISIT (OUTPATIENT)
Dept: URGENT CARE | Facility: URGENT CARE | Age: 72
End: 2022-12-02
Payer: COMMERCIAL

## 2022-12-02 ENCOUNTER — ANCILLARY PROCEDURE (OUTPATIENT)
Dept: GENERAL RADIOLOGY | Facility: CLINIC | Age: 72
End: 2022-12-02
Attending: FAMILY MEDICINE
Payer: COMMERCIAL

## 2022-12-02 VITALS
TEMPERATURE: 98.1 F | OXYGEN SATURATION: 95 % | DIASTOLIC BLOOD PRESSURE: 63 MMHG | SYSTOLIC BLOOD PRESSURE: 99 MMHG | HEART RATE: 82 BPM

## 2022-12-02 DIAGNOSIS — M62.81 GENERALIZED MUSCLE WEAKNESS: ICD-10-CM

## 2022-12-02 DIAGNOSIS — J20.9 ACUTE BRONCHITIS WITH SYMPTOMS > 10 DAYS: ICD-10-CM

## 2022-12-02 DIAGNOSIS — R05.3 PERSISTENT COUGH: Primary | ICD-10-CM

## 2022-12-02 DIAGNOSIS — J18.9 PNEUMONIA OF LEFT LOWER LOBE DUE TO INFECTIOUS ORGANISM: ICD-10-CM

## 2022-12-02 DIAGNOSIS — R20.2 TINGLING OF BOTH FEET: ICD-10-CM

## 2022-12-02 LAB
ALBUMIN SERPL BCG-MCNC: 3.3 G/DL (ref 3.5–5.2)
ALP SERPL-CCNC: 77 U/L (ref 40–129)
ALT SERPL W P-5'-P-CCNC: 36 U/L (ref 10–50)
ANION GAP SERPL CALCULATED.3IONS-SCNC: 15 MMOL/L (ref 7–15)
AST SERPL W P-5'-P-CCNC: 62 U/L (ref 10–50)
BASOPHILS # BLD AUTO: 0 10E3/UL (ref 0–0.2)
BASOPHILS NFR BLD AUTO: 0 %
BILIRUB SERPL-MCNC: 0.6 MG/DL
BUN SERPL-MCNC: 26.4 MG/DL (ref 8–23)
CALCIUM SERPL-MCNC: 8.4 MG/DL (ref 8.8–10.2)
CHLORIDE SERPL-SCNC: 99 MMOL/L (ref 98–107)
CREAT SERPL-MCNC: 1 MG/DL (ref 0.67–1.17)
DEPRECATED HCO3 PLAS-SCNC: 24 MMOL/L (ref 22–29)
EOSINOPHIL # BLD AUTO: 0 10E3/UL (ref 0–0.7)
EOSINOPHIL NFR BLD AUTO: 0 %
ERYTHROCYTE [DISTWIDTH] IN BLOOD BY AUTOMATED COUNT: 13.4 % (ref 10–15)
GFR SERPL CREATININE-BSD FRML MDRD: 79 ML/MIN/1.73M2
GLUCOSE SERPL-MCNC: 98 MG/DL (ref 70–99)
HCT VFR BLD AUTO: 41.9 % (ref 40–53)
HGB BLD-MCNC: 13.6 G/DL (ref 13.3–17.7)
LYMPHOCYTES # BLD AUTO: 1 10E3/UL (ref 0.8–5.3)
LYMPHOCYTES NFR BLD AUTO: 8 %
MCH RBC QN AUTO: 29.8 PG (ref 26.5–33)
MCHC RBC AUTO-ENTMCNC: 32.5 G/DL (ref 31.5–36.5)
MCV RBC AUTO: 92 FL (ref 78–100)
MONOCYTES # BLD AUTO: 0.8 10E3/UL (ref 0–1.3)
MONOCYTES NFR BLD AUTO: 6 %
NEUTROPHILS # BLD AUTO: 10.9 10E3/UL (ref 1.6–8.3)
NEUTROPHILS NFR BLD AUTO: 85 %
PLATELET # BLD AUTO: 250 10E3/UL (ref 150–450)
POTASSIUM SERPL-SCNC: 3.5 MMOL/L (ref 3.4–5.3)
PROT SERPL-MCNC: 6.8 G/DL (ref 6.4–8.3)
RBC # BLD AUTO: 4.57 10E6/UL (ref 4.4–5.9)
SODIUM SERPL-SCNC: 138 MMOL/L (ref 136–145)
VIT B12 SERPL-MCNC: 600 PG/ML (ref 232–1245)
WBC # BLD AUTO: 12.8 10E3/UL (ref 4–11)

## 2022-12-02 PROCEDURE — 36415 COLL VENOUS BLD VENIPUNCTURE: CPT | Performed by: FAMILY MEDICINE

## 2022-12-02 PROCEDURE — 99214 OFFICE O/P EST MOD 30 MIN: CPT | Performed by: FAMILY MEDICINE

## 2022-12-02 PROCEDURE — 71046 X-RAY EXAM CHEST 2 VIEWS: CPT | Mod: TC | Performed by: RADIOLOGY

## 2022-12-02 PROCEDURE — 80053 COMPREHEN METABOLIC PANEL: CPT | Performed by: FAMILY MEDICINE

## 2022-12-02 PROCEDURE — 85025 COMPLETE CBC W/AUTO DIFF WBC: CPT | Performed by: FAMILY MEDICINE

## 2022-12-02 PROCEDURE — 82607 VITAMIN B-12: CPT | Performed by: FAMILY MEDICINE

## 2022-12-02 RX ORDER — BENZONATATE 100 MG/1
100 CAPSULE ORAL 3 TIMES DAILY PRN
Qty: 30 CAPSULE | Refills: 0 | Status: SHIPPED | OUTPATIENT
Start: 2022-12-02 | End: 2023-04-20

## 2022-12-02 RX ORDER — DOXYCYCLINE 100 MG/1
100 CAPSULE ORAL 2 TIMES DAILY
Qty: 14 CAPSULE | Refills: 0 | Status: SHIPPED | OUTPATIENT
Start: 2022-12-02 | End: 2022-12-09

## 2022-12-02 NOTE — PROGRESS NOTES
Chief Complaint   Patient presents with     Cough     Started about 10 days ago, wife was diagnosed with influenza a on the 17th, fatigued, bp has been low, did an at home COVID test abt 5 days ago and it was negative     Pain     Right and left foot pain, says he feels like hes walking on nails muscle weakness, tingling sensation, sensitive to touch, okay when sitting but when using them at all they hurt and tingle     Steven was seen today for cough and pain.    Diagnoses and all orders for this visit:    Persistent cough  -     XR Chest 2 Views  -     CBC with platelets and differential; Future  -     CBC with platelets and differential    Tingling of both feet  -     Vitamin B12; Future  -     Vitamin B12    Generalized muscle weakness  -     Comprehensive metabolic panel (BMP + Alb, Alk Phos, ALT, AST, Total. Bili, TP); Future  -     Vitamin B12; Future  -     Comprehensive metabolic panel (BMP + Alb, Alk Phos, ALT, AST, Total. Bili, TP)  -     Vitamin B12    Acute bronchitis with symptoms > 10 days  -     doxycycline hyclate (VIBRAMYCIN) 100 MG capsule; Take 1 capsule (100 mg) by mouth 2 times daily for 7 days  -     benzonatate (TESSALON) 100 MG capsule; Take 1 capsule (100 mg) by mouth 3 times daily as needed for cough    Pneumonia of left lower lobe due to infectious organism  -     doxycycline hyclate (VIBRAMYCIN) 100 MG capsule; Take 1 capsule (100 mg) by mouth 2 times daily for 7 days      D/d  Acute Bronchitis  Acute Sinusitis  Cough  Influenza  Pneumonia  Prolonged Cough  RSV  Upper Respiratory Infection  Weakness could be related to lack of activity, B12 deficiency, electrolyte disbalance    PLAN:  See orders in Epic  Symptomatic measures encouraged, humidified air, plenty of fluids.      Symptomatic measures encouraged, humidified air, plenty of fluids.    Your appetite may be low, so a light diet is fine. Stay well hydrated by drinking 6 to 8 glasses of fluids per day. This includes water, soft  drinks, sports drinks, juices, tea, or soup. Extra fluids will help loosen mucus in your nose and lungs.    Over-the-counter cough, cold, and sore-throat medicines will not shorten the length of the illness, but they may be helpful to reduce your symptoms. Don't use decongestants if you have high blood pressure.    Finish all antibiotic medicine. Do this even if you are feeling better after only a few days.  Follow up if symptoms worsen  such as sob, high fever and chest pain in 2-3 days   Results and clinical findings reviewed pt and family member   X-ray PA view did not show any acute infiltrate but lateral view did show some opacity due to the duration of the symptom would consider treating with the antibiotic.  And also do some Tessalon Perles to help with the coughing.  About the tingling and lower extremity edema suggested patient to elevate the feet and also consider doing some walking with the help of a walker.  Also advised to do some breathing exercises to help with the chest symptoms.  CBC did show slightly elevated white blood cell count with the left shift    SUBJECTIVE:  Steven Dye is a 72 year old male with history of high blood pressure hyperlipidemia, Parkinson's disease recently stopped amlodipine because of low blood pressure symptoms.  Who presents to the clinic today with a chief complaint of persistent coughing along with generalized weakness and also noticing some tingling in both feet with pain in the bottom of the feet mostly over the ball of the both feet for the last couple of weeks.  cough  for 10 day(s).  His cough is described as persistent.    The patient's symptoms are moderate and not changing over the course of time.  Associated symptoms include weakness . The patient's symptoms are exacerbated by no particular triggers  Patient has been using nothing  to improve symptoms.    Past Medical History:   Diagnosis Date     Complete AV block (H)     [employer] dual  chamber 4/1/2011     Hyperlipidemia      Hypertension      Parkinson disease (H)      Syncope        Current Outpatient Medications   Medication Sig Dispense Refill     aspirin 81 MG tablet Take 81 mg by mouth daily       benzonatate (TESSALON) 100 MG capsule Take 1 capsule (100 mg) by mouth 3 times daily as needed for cough 30 capsule 0     carbidopa-levodopa (SINEMET)  MG per tablet Take 1 tablet by mouth 4 times daily       doxycycline hyclate (VIBRAMYCIN) 100 MG capsule Take 1 capsule (100 mg) by mouth 2 times daily for 7 days 14 capsule 0     lisinopril-hydrochlorothiazide (ZESTORETIC) 10-12.5 MG tablet Take 1 tablet by mouth daily 90 tablet 3     metoprolol succinate ER (TOPROL XL) 50 MG 24 hr tablet Take 1 tablet (50 mg) by mouth daily 90 tablet 3       Social History     Tobacco Use     Smoking status: Never     Smokeless tobacco: Never   Substance Use Topics     Alcohol use: Yes     Comment: glass of wine occ       ROS  Review of systems negative except as stated above.    OBJECTIVE:  BP 99/63 (BP Location: Right arm, Patient Position: Sitting, Cuff Size: Adult Regular)   Pulse 82   Temp 98.1  F (36.7  C) (Oral)   SpO2 95%   GENERAL APPEARANCE: healthy, alert and no distress,sitting in wheelchair die to weakness   EYES: EOMI,  PERRL, conjunctiva clear  HENT: ear canals and TM's normal.  Nose and mouth without ulcers, erythema or lesions  NECK: supple, nontender, no lymphadenopathy  RESP: lungs clear to auscultation - no rales, rhonchi or wheezes  CV: regular rates and rhythm, normal S1 S2, no murmur noted  SKIN:dry skin noted in the lower extremities   Also there is trace edema noted   PSYCH: mentation appears normal    Tricia Paz MD

## 2022-12-22 ENCOUNTER — ANCILLARY PROCEDURE (OUTPATIENT)
Dept: CARDIOLOGY | Facility: CLINIC | Age: 72
End: 2022-12-22
Attending: INTERNAL MEDICINE
Payer: COMMERCIAL

## 2022-12-22 DIAGNOSIS — I44.2 ATRIOVENTRICULAR BLOCK, COMPLETE (H): ICD-10-CM

## 2022-12-22 DIAGNOSIS — Z95.0 CARDIAC PACEMAKER IN SITU: ICD-10-CM

## 2023-01-11 NOTE — TELEPHONE ENCOUNTER
"01/11/23 Pts wife Hermila called back. She stated pt did stop Amlodipine in December. They then went to [place] and thinks that when they were filling pts pill box for trip they accidentally added Amlodipine and pt has been taking 2.5 mg daily ever since.  They have not been checking BPS at home but pt has been feeling well with no dizziness , but has \" lots of fatigue\".  This am BP 79/59.  They will stop the Amlodipine and check BP 1-2x/day for the next 2 weeks . Plan made to call pt/wife to re-assess BPS at that time.  Wife will continue to monitor and call back if SBP consistently > 130 prior tot that  KHerroRN [id number]   "

## 2023-03-01 ENCOUNTER — ANCILLARY PROCEDURE (OUTPATIENT)
Dept: CARDIOLOGY | Facility: CLINIC | Age: 73
End: 2023-03-01
Attending: INTERNAL MEDICINE
Payer: COMMERCIAL

## 2023-03-01 DIAGNOSIS — Z95.0 CARDIAC PACEMAKER IN SITU: ICD-10-CM

## 2023-03-01 DIAGNOSIS — I44.2 ATRIOVENTRICULAR BLOCK, COMPLETE (H): ICD-10-CM

## 2023-03-01 PROCEDURE — 93280 PM DEVICE PROGR EVAL DUAL: CPT | Performed by: INTERNAL MEDICINE

## 2023-04-19 ENCOUNTER — MYC MEDICAL ADVICE (OUTPATIENT)
Dept: CARDIOLOGY | Facility: CLINIC | Age: 73
End: 2023-04-19
Payer: COMMERCIAL

## 2023-05-03 ENCOUNTER — ANCILLARY PROCEDURE (OUTPATIENT)
Dept: CARDIOLOGY | Facility: CLINIC | Age: 73
End: 2023-05-03
Attending: INTERNAL MEDICINE
Payer: COMMERCIAL

## 2023-05-03 DIAGNOSIS — Z95.0 CARDIAC PACEMAKER IN SITU: ICD-10-CM

## 2023-05-03 DIAGNOSIS — I44.2 ATRIOVENTRICULAR BLOCK, COMPLETE (H): ICD-10-CM

## 2023-05-03 PROCEDURE — 93280 PM DEVICE PROGR EVAL DUAL: CPT | Performed by: INTERNAL MEDICINE

## 2023-05-14 LAB
MDC_IDC_LEAD_IMPLANT_DT: NORMAL
MDC_IDC_LEAD_IMPLANT_DT: NORMAL
MDC_IDC_LEAD_LOCATION: NORMAL
MDC_IDC_LEAD_LOCATION: NORMAL
MDC_IDC_LEAD_LOCATION_DETAIL_1: NORMAL
MDC_IDC_LEAD_LOCATION_DETAIL_1: NORMAL
MDC_IDC_LEAD_MFG: NORMAL
MDC_IDC_LEAD_MFG: NORMAL
MDC_IDC_LEAD_MODEL: NORMAL
MDC_IDC_LEAD_MODEL: NORMAL
MDC_IDC_LEAD_POLARITY_TYPE: NORMAL
MDC_IDC_LEAD_POLARITY_TYPE: NORMAL
MDC_IDC_LEAD_SERIAL: NORMAL
MDC_IDC_LEAD_SERIAL: NORMAL
MDC_IDC_MSMT_LEADCHNL_RA_IMPEDANCE_VALUE: 780 OHM
MDC_IDC_MSMT_LEADCHNL_RA_PACING_THRESHOLD_AMPLITUDE: 0.6 V
MDC_IDC_MSMT_LEADCHNL_RA_PACING_THRESHOLD_PULSEWIDTH: 0.5 MS
MDC_IDC_MSMT_LEADCHNL_RA_SENSING_INTR_AMPL: 3 MV
MDC_IDC_MSMT_LEADCHNL_RV_IMPEDANCE_VALUE: 760 OHM
MDC_IDC_MSMT_LEADCHNL_RV_PACING_THRESHOLD_AMPLITUDE: 0.8 V
MDC_IDC_MSMT_LEADCHNL_RV_PACING_THRESHOLD_PULSEWIDTH: 0.4 MS
MDC_IDC_PG_IMPLANT_DTM: NORMAL
MDC_IDC_PG_MFG: NORMAL
MDC_IDC_PG_MODEL: NORMAL
MDC_IDC_PG_SERIAL: NORMAL
MDC_IDC_PG_TYPE: NORMAL
MDC_IDC_SESS_CLINIC_NAME: NORMAL
MDC_IDC_SESS_DTM: NORMAL
MDC_IDC_SESS_TYPE: NORMAL
MDC_IDC_STAT_BRADY_RA_PERCENT_PACED: 25 %
MDC_IDC_STAT_BRADY_RV_PERCENT_PACED: 100 %

## 2023-05-31 ENCOUNTER — OFFICE VISIT (OUTPATIENT)
Dept: FAMILY MEDICINE | Facility: CLINIC | Age: 73
End: 2023-05-31
Payer: COMMERCIAL

## 2023-05-31 VITALS
BODY MASS INDEX: 22.53 KG/M2 | HEIGHT: 73 IN | RESPIRATION RATE: 16 BRPM | SYSTOLIC BLOOD PRESSURE: 128 MMHG | TEMPERATURE: 97.5 F | DIASTOLIC BLOOD PRESSURE: 83 MMHG | HEART RATE: 60 BPM | WEIGHT: 170 LBS | OXYGEN SATURATION: 99 %

## 2023-05-31 DIAGNOSIS — E78.5 HYPERLIPIDEMIA LDL GOAL <100: ICD-10-CM

## 2023-05-31 DIAGNOSIS — Z00.00 ENCOUNTER FOR SUBSEQUENT ANNUAL WELLNESS VISIT IN MEDICARE PATIENT: Primary | ICD-10-CM

## 2023-05-31 DIAGNOSIS — Z11.59 NEED FOR HEPATITIS C SCREENING TEST: ICD-10-CM

## 2023-05-31 PROBLEM — H50.32 INTERMITTENT ALTERNATING ESOTROPIA: Status: ACTIVE | Noted: 2018-11-06

## 2023-05-31 LAB
CHOLEST SERPL-MCNC: 152 MG/DL
HDLC SERPL-MCNC: 45 MG/DL
LDLC SERPL CALC-MCNC: 92 MG/DL
NONHDLC SERPL-MCNC: 107 MG/DL
TRIGL SERPL-MCNC: 77 MG/DL

## 2023-05-31 PROCEDURE — G0439 PPPS, SUBSEQ VISIT: HCPCS | Performed by: INTERNAL MEDICINE

## 2023-05-31 PROCEDURE — 36415 COLL VENOUS BLD VENIPUNCTURE: CPT | Performed by: INTERNAL MEDICINE

## 2023-05-31 PROCEDURE — 86803 HEPATITIS C AB TEST: CPT | Performed by: INTERNAL MEDICINE

## 2023-05-31 PROCEDURE — 80061 LIPID PANEL: CPT | Performed by: INTERNAL MEDICINE

## 2023-05-31 RX ORDER — CARBIDOPA AND LEVODOPA 25; 100 MG/1; MG/1
1 TABLET, EXTENDED RELEASE ORAL AT BEDTIME
COMMUNITY
Start: 2023-04-18

## 2023-05-31 RX ORDER — CARBIDOPA AND LEVODOPA 25; 100 MG/1; MG/1
TABLET ORAL
COMMUNITY
Start: 2023-04-18

## 2023-05-31 ASSESSMENT — ENCOUNTER SYMPTOMS
ARTHRALGIAS: 0
COUGH: 0
HEMATURIA: 0
SHORTNESS OF BREATH: 0
CHILLS: 0
HEADACHES: 0
DYSURIA: 0
FEVER: 0
DIZZINESS: 0
SORE THROAT: 0
JOINT SWELLING: 0
PALPITATIONS: 0
WEAKNESS: 0
PARESTHESIAS: 0
NERVOUS/ANXIOUS: 0
FREQUENCY: 1
NAUSEA: 0
ABDOMINAL PAIN: 0
EYE PAIN: 0
MYALGIAS: 0
CONSTIPATION: 0
HEARTBURN: 0
HEMATOCHEZIA: 0
DIARRHEA: 0

## 2023-05-31 ASSESSMENT — ACTIVITIES OF DAILY LIVING (ADL): CURRENT_FUNCTION: TRANSPORTATION REQUIRES ASSISTANCE

## 2023-05-31 ASSESSMENT — PAIN SCALES - GENERAL: PAINLEVEL: NO PAIN (0)

## 2023-05-31 NOTE — PROGRESS NOTES
"SUBJECTIVE:   Tim is a 72 year old who presents for Preventive Visit.  Patient has been advised of split billing requirements and indicates understanding: Yes  Are you in the first 12 months of your Medicare coverage?      Healthy Habits:     In general, how would you rate your overall health?  Good    Frequency of exercise:  1 day/week    Duration of exercise:  Less than 15 minutes    Do you usually eat at least 4 servings of fruit and vegetables a day, include whole grains    & fiber and avoid regularly eating high fat or \"junk\" foods?  No    Taking medications regularly:  Yes    Medication side effects:  None    Ability to successfully perform activities of daily living:  Transportation requires assistance    Home Safety:  No safety concerns identified    Hearing Impairment:  No hearing concerns    In the past 6 months, have you been bothered by leaking of urine? Yes    In general, how would you rate your overall mental or emotional health?  Good      PHQ-2 Total Score: 0    Additional concerns today:  No        Have you ever done Advance Care Planning? (For example, a Health Directive, POLST, or a discussion with a medical provider or your loved ones about your wishes): Yes, patient states has an Advance Care Planning document and will bring a copy to the clinic.       Fall risk  Fallen 2 or more times in the past year?: Yes (10sec)  Any fall with injury in the past year?: Yes    Cognitive Screening   1) Repeat 3 items (Leader, Season, Table)    2) Clock draw: NORMAL  3) 3 item recall: Recalls 3 objects  Results: 3 items recalled: COGNITIVE IMPAIRMENT LESS LIKELY    Mini-CogTM Copyright TY Gonzalez. Licensed by the author for use in [facility]; reprinted with permission (mike@.[facility]). All rights reserved.      Do you have sleep apnea, excessive snoring or daytime drowsiness?: no    Reviewed and updated as needed this visit by clinical staff   Tobacco  Allergies  Meds              Reviewed and " updated as needed this visit by Provider                 Social History     Tobacco Use     Smoking status: Never     Smokeless tobacco: Never   Vaping Use     Vaping status: Not on file   Substance Use Topics     Alcohol use: Yes     Comment: glass of wine occ             5/31/2023     9:35 AM   Alcohol Use   Prescreen: >3 drinks/day or >7 drinks/week? No     Do you have a current opioid prescription? No  Do you use any other controlled substances or medications that are not prescribed by a provider? None        Current providers sharing in care for this patient include:  Patient Care Team:  No Ref-Primary, Physician as PCP - Janki Martinez PA-C as Assigned Heart and Vascular Provider  Alexandru Huggins MD as Assigned PCP  Denise Ohara MD (Inactive) as MD (Ophthalmology)    The following health maintenance items are reviewed in Epic and correct as of today:  Health Maintenance   Topic Date Due     ANNUAL REVIEW OF HM ORDERS  Never done     HEPATITIS C SCREENING  Never done     ZOSTER IMMUNIZATION (2 of 3) 04/29/2013     MEDICARE ANNUAL WELLNESS VISIT  12/16/2022     COVID-19 Vaccine (5 - Moderna series) 02/19/2023     FALL RISK ASSESSMENT  05/31/2024     LIPID  12/16/2026     ADVANCE CARE PLANNING  12/16/2026     DTAP/TDAP/TD IMMUNIZATION (4 - Td or Tdap) 02/20/2027     COLORECTAL CANCER SCREENING  09/13/2031     PHQ-2 (once per calendar year)  Completed     INFLUENZA VACCINE  Completed     Pneumococcal Vaccine: 65+ Years  Completed     AORTIC ANEURYSM SCREENING (SYSTEM ASSIGNED)  Completed     IPV IMMUNIZATION  Aged Out     MENINGITIS IMMUNIZATION  Aged Out     Labs reviewed in EPIC  Review of Systems  Constitutional, HEENT, cardiovascular, pulmonary, GI, , musculoskeletal, neuro, skin, endocrine and psych systems are negative, except as otherwise noted.    OBJECTIVE:   /83 (BP Location: Right arm, Patient Position: Sitting, Cuff Size: Adult Regular)   Pulse 60   Temp 97.5  F  "(36.4  C) (Oral)   Resp 16   Ht 1.854 m (6' 1\")   Wt 77.1 kg (170 lb)   SpO2 99%   BMI 22.43 kg/m   Estimated body mass index is 22.43 kg/m  as calculated from the following:    Height as of this encounter: 1.854 m (6' 1\").    Weight as of this encounter: 77.1 kg (170 lb).  Physical Exam  GENERAL: alert and no distress  EYES: Eyes grossly normal to inspection, conjunctivae and sclerae normal  HENT: ear canals and TM's normal, nose and mouth without ulcers or lesions  NECK: no asymmetry  RESP: lungs clear to auscultation  CV: regular rate and rhythm  ABDOMEN: soft, nondistended  MS: no gross musculoskeletal defects noted  SKIN: no suspicious lesions or rashes  NEURO: Normal strength and tone, mentation intact and speech normal  PSYCH: mentation appears normal, affect normal/bright    Diagnostic Test Results:  Labs reviewed in Epic    ASSESSMENT / PLAN:   Steven was seen today for physical.    Diagnoses and all orders for this visit:    Encounter for subsequent annual wellness visit in Medicare patient    Need for hepatitis C screening test  -     Hepatitis C Screen Reflex to HCV RNA Quant and Genotype; Future  -     Hepatitis C Screen Reflex to HCV RNA Quant and Genotype    Hyperlipidemia LDL goal <100  -     Lipid panel reflex to direct LDL Fasting; Future  -     Lipid panel reflex to direct LDL Fasting    Other orders  -     REVIEW OF HEALTH MAINTENANCE PROTOCOL ORDERS        Patient has been advised of split billing requirements and indicates understanding: Yes      COUNSELING:  Reviewed preventive health counseling, as reflected in patient instructions  Special attention given to:       Regular exercise       Healthy diet/nutrition        He reports that he has never smoked. He has never used smokeless tobacco.      Appropriate preventive services were discussed with this patient, including applicable screening as appropriate for cardiovascular disease, diabetes, osteopenia/osteoporosis, and glaucoma.  As " appropriate for age/gender, discussed screening for colorectal cancer, prostate cancer, breast cancer, and cervical cancer. Checklist reviewing preventive services available has been given to the patient.    Reviewed patients plan of care and provided an AVS. The Basic Care Plan (routine screening as documented in Health Maintenance) for Steven meets the Care Plan requirement. This Care Plan has been established and reviewed with the Patient.      Angela Gao MD  [facility]    Identified Health Risks:    I have reviewed Opioid Use Disorder and Substance Use Disorder risk factors and made any needed referrals.

## 2023-06-01 ENCOUNTER — ANCILLARY PROCEDURE (OUTPATIENT)
Dept: CARDIOLOGY | Facility: CLINIC | Age: 73
End: 2023-06-01
Attending: INTERNAL MEDICINE
Payer: COMMERCIAL

## 2023-06-01 DIAGNOSIS — I44.2 ATRIOVENTRICULAR BLOCK, COMPLETE (H): ICD-10-CM

## 2023-06-01 DIAGNOSIS — Z95.0 CARDIAC PACEMAKER IN SITU: ICD-10-CM

## 2023-06-01 LAB — HCV AB SERPL QL IA: NONREACTIVE

## 2023-06-01 PROCEDURE — 93293 PM PHONE R-STRIP DEVICE EVAL: CPT | Performed by: INTERNAL MEDICINE

## 2023-07-13 ENCOUNTER — ANCILLARY PROCEDURE (OUTPATIENT)
Dept: CARDIOLOGY | Facility: CLINIC | Age: 73
End: 2023-07-13
Attending: INTERNAL MEDICINE
Payer: COMMERCIAL

## 2023-07-13 DIAGNOSIS — I44.2 ATRIOVENTRICULAR BLOCK, COMPLETE (H): ICD-10-CM

## 2023-07-13 DIAGNOSIS — Z95.0 CARDIAC PACEMAKER IN SITU: ICD-10-CM

## 2023-08-22 ENCOUNTER — ANCILLARY PROCEDURE (OUTPATIENT)
Dept: CARDIOLOGY | Facility: CLINIC | Age: 73
End: 2023-08-22
Attending: INTERNAL MEDICINE
Payer: COMMERCIAL

## 2023-08-22 DIAGNOSIS — Z95.0 CARDIAC PACEMAKER IN SITU: ICD-10-CM

## 2023-08-22 DIAGNOSIS — I44.2 ATRIOVENTRICULAR BLOCK, COMPLETE (H): ICD-10-CM

## 2023-08-22 PROCEDURE — 99207 CARDIAC DEVICE CHECK TRANSTELEPHONIC: CPT | Performed by: INTERNAL MEDICINE

## 2023-09-20 ENCOUNTER — ANCILLARY PROCEDURE (OUTPATIENT)
Dept: CARDIOLOGY | Facility: CLINIC | Age: 73
End: 2023-09-20
Attending: INTERNAL MEDICINE
Payer: COMMERCIAL

## 2023-09-20 DIAGNOSIS — I44.2 ATRIOVENTRICULAR BLOCK, COMPLETE (H): ICD-10-CM

## 2023-09-20 DIAGNOSIS — Z95.0 CARDIAC PACEMAKER IN SITU: ICD-10-CM

## 2023-09-20 PROCEDURE — 93280 PM DEVICE PROGR EVAL DUAL: CPT | Performed by: INTERNAL MEDICINE

## 2023-09-25 ENCOUNTER — TELEPHONE (OUTPATIENT)
Dept: CARDIOLOGY | Facility: CLINIC | Age: 73
End: 2023-09-25
Payer: COMMERCIAL

## 2023-09-25 NOTE — TELEPHONE ENCOUNTER
Health Call Center    Phone Message    May a detailed message be left on voicemail: yes     Reason for Call: Other: Hermila called requesting to speak with a member of Tim's care team about how he can get his device checked or get the approval that it is okay for him to leave down for the winter from January through March. Please reach out to Hermila to discuss. Thank you!     Action Taken: Other: Cardiology    Travel Screening: Not Applicable    Thank you!  [facility]

## 2023-09-25 NOTE — TELEPHONE ENCOUNTER
Received below message from scheduling.  Patient has a [employer] device.  This device cannot due remote monitoring.      He is dependent, with last underlying rhythm showing SR in the 70's with CHB and no junctional escape at VVI 30.  Battery with <0.5 years remaining on both 5/3/23 and 9/20/23 device checks.  Patient is completing monthly device checks at this time.      Called and spoke with patient's wife, Martha, and explained that as patient is dependent and approaching the replacement time, we would recommend continuing with monthly checks at this time.  Explained that we would want to get his device replaced as soon as possible after it triggered and typically try to do this within 1-2 months for dependent patients.  Stated if he does not trigger before they travel from mid January to the end of March, then it would be recommended that he see a cardiologist where he was going and have it checked there.  Stated at this time we are in the waiting period and it is possible that he would trigger before they travel and that he will have it all taken care of before then.  Did notify her that he would need to have a device check a week after implant to check the incision and make sure everything is looking okay so they could factor this into her timeline.      Martha was appreciative of the information and stated that they will wait to see what happens as they get closer to their travel dates.      SUJATAH Velazquez

## 2023-09-26 ENCOUNTER — TELEPHONE (OUTPATIENT)
Dept: CARDIOLOGY | Facility: CLINIC | Age: 73
End: 2023-09-26
Payer: COMMERCIAL

## 2023-09-26 NOTE — TELEPHONE ENCOUNTER
[facility] Call Center    Phone Message    May a detailed message be left on voicemail: yes     Reason for Call: Other: Pt's wife would like a call back to discuss appointment and medication as they will be out of town. Please call back to discuss.     Action Taken: Other: Cardio    Travel Screening: Not Applicable

## 2023-09-27 ENCOUNTER — TELEPHONE (OUTPATIENT)
Dept: CARDIOLOGY | Facility: CLINIC | Age: 73
End: 2023-09-27
Payer: COMMERCIAL

## 2023-09-27 NOTE — TELEPHONE ENCOUNTER
Called pt back and had to leave a non detailed VM  : When pt calls back will need to explain process of transferring care to [place]  Pt had requested a referral for this. (?) not aware of the need for this.  Nicole SOLARES

## 2023-09-27 NOTE — TELEPHONE ENCOUNTER
[facility] Call Center    Phone Message    May a detailed message be left on voicemail: yes     Reason for Call: Other: Patient's spouse Hermila called requesting for a referral for pacemaker check to be sent to [facility]. Hermila stated they will be in [place] during Feb and March of next year, so needing a referral so patient can continue to get their device checked. Please call Hermila back to further discuss.      [facility]  Fax#: [phone]    Action Taken: Other: Cardiology    Travel Screening: Not Applicable    Thank you!  [facility]

## 2023-09-28 NOTE — TELEPHONE ENCOUNTER
Received message from patient's wife Hermila asking for how to get records sent to the cardiology clinic in [place] for when they are there in February and March.      Called and spoke with Hermila and explained that the clinic in [place] would typically reach out to obtain records and that they would just need to notify the clinic that he has a PPM and where he received his care here in [place].  She stated the clinic there had asked her to have us send records and that she did go online and fill out a request for information from the medical records department already today.  She will have the clinic in [place] reach out if they have any questions or need further information.     SUJATHA Velazquez

## 2023-10-09 ENCOUNTER — TELEPHONE (OUTPATIENT)
Dept: CARDIOLOGY | Facility: CLINIC | Age: 73
End: 2023-10-09
Payer: COMMERCIAL

## 2023-10-09 DIAGNOSIS — I44.2 ATRIOVENTRICULAR BLOCK, COMPLETE (H): Primary | ICD-10-CM

## 2023-10-09 DIAGNOSIS — Z95.0 CARDIAC PACEMAKER IN SITU: ICD-10-CM

## 2023-10-09 DIAGNOSIS — E78.9 DISORDER OF LIPOID METABOLISM: ICD-10-CM

## 2023-10-09 DIAGNOSIS — I10 HYPERTENSION: ICD-10-CM

## 2023-10-09 DIAGNOSIS — I44.2 COMPLETE AV BLOCK (H): ICD-10-CM

## 2023-10-09 NOTE — TELEPHONE ENCOUNTER
Health Call Center    Phone Message    May a detailed message be left on voicemail: yes     Reason for Call: Other: Wife would like referral sent to [facility]. They are requesting a referral. Hermila did get records sent to them. But still need a referral. Please fax referral to [phone]      Action Taken: Other: Cardiology     Travel Screening: Not Applicable    Thank you!  [facility]

## 2023-10-13 NOTE — TELEPHONE ENCOUNTER
Received message from call center stating patient's wife called in requesting a referral to the cardiology clinic in [place].     Cardiology referral faxed to [facility] at [phone] as requested.     Called and left message for patient's wife notifying her that this was completed.  Device clinic phone number provided for any questions.    SUJATHA Velazquez

## 2023-10-16 ENCOUNTER — NURSE TRIAGE (OUTPATIENT)
Dept: FAMILY MEDICINE | Facility: CLINIC | Age: 73
End: 2023-10-16

## 2023-10-16 ENCOUNTER — OFFICE VISIT (OUTPATIENT)
Dept: FAMILY MEDICINE | Facility: CLINIC | Age: 73
End: 2023-10-16
Payer: COMMERCIAL

## 2023-10-16 ENCOUNTER — OFFICE VISIT (OUTPATIENT)
Dept: PEDIATRICS | Facility: CLINIC | Age: 73
End: 2023-10-16
Payer: COMMERCIAL

## 2023-10-16 VITALS
HEART RATE: 75 BPM | TEMPERATURE: 97.6 F | DIASTOLIC BLOOD PRESSURE: 80 MMHG | BODY MASS INDEX: 22.53 KG/M2 | RESPIRATION RATE: 16 BRPM | WEIGHT: 170 LBS | SYSTOLIC BLOOD PRESSURE: 124 MMHG | OXYGEN SATURATION: 98 % | HEIGHT: 73 IN

## 2023-10-16 VITALS
DIASTOLIC BLOOD PRESSURE: 80 MMHG | OXYGEN SATURATION: 98 % | SYSTOLIC BLOOD PRESSURE: 124 MMHG | HEART RATE: 75 BPM | TEMPERATURE: 97.6 F | BODY MASS INDEX: 22.43 KG/M2 | WEIGHT: 170 LBS

## 2023-10-16 DIAGNOSIS — N40.1 BENIGN PROSTATIC HYPERPLASIA WITH URINARY FREQUENCY: ICD-10-CM

## 2023-10-16 DIAGNOSIS — R35.0 BENIGN PROSTATIC HYPERPLASIA WITH URINARY FREQUENCY: ICD-10-CM

## 2023-10-16 DIAGNOSIS — R39.9 URINARY SYMPTOM OR SIGN: Primary | ICD-10-CM

## 2023-10-16 DIAGNOSIS — R39.15 URINARY URGENCY: Primary | ICD-10-CM

## 2023-10-16 DIAGNOSIS — R32 URINARY INCONTINENCE, UNSPECIFIED TYPE: ICD-10-CM

## 2023-10-16 LAB
ALBUMIN UR-MCNC: 30 MG/DL
APPEARANCE UR: CLEAR
BACTERIA #/AREA URNS HPF: ABNORMAL /HPF
BILIRUB UR QL STRIP: NEGATIVE
COLOR UR AUTO: YELLOW
GLUCOSE UR STRIP-MCNC: NEGATIVE MG/DL
HGB UR QL STRIP: NEGATIVE
KETONES UR STRIP-MCNC: NEGATIVE MG/DL
LEUKOCYTE ESTERASE UR QL STRIP: NEGATIVE
NITRATE UR QL: NEGATIVE
PH UR STRIP: 5.5 [PH] (ref 5–7)
RBC #/AREA URNS AUTO: ABNORMAL /HPF
SP GR UR STRIP: 1.02 (ref 1–1.03)
SQUAMOUS #/AREA URNS AUTO: ABNORMAL /LPF
UROBILINOGEN UR STRIP-ACNC: 0.2 E.U./DL
WBC #/AREA URNS AUTO: ABNORMAL /HPF

## 2023-10-16 PROCEDURE — 99207 PR FIRST ORDER ACUTE REFERRAL: CPT | Performed by: NURSE PRACTITIONER

## 2023-10-16 PROCEDURE — 99215 OFFICE O/P EST HI 40 MIN: CPT | Performed by: PREVENTIVE MEDICINE

## 2023-10-16 PROCEDURE — 81001 URINALYSIS AUTO W/SCOPE: CPT | Performed by: NURSE PRACTITIONER

## 2023-10-16 PROCEDURE — 99417 PROLNG OP E/M EACH 15 MIN: CPT | Performed by: PREVENTIVE MEDICINE

## 2023-10-16 RX ORDER — MIRABEGRON 25 MG/1
25 TABLET, FILM COATED, EXTENDED RELEASE ORAL DAILY
Qty: 30 TABLET | Refills: 0 | Status: SHIPPED | OUTPATIENT
Start: 2023-10-16 | End: 2023-11-15

## 2023-10-16 ASSESSMENT — ENCOUNTER SYMPTOMS
FLANK PAIN: 0
DIFFICULTY URINATING: 1
CHILLS: 0
DYSURIA: 0
FEVER: 0

## 2023-10-16 ASSESSMENT — PAIN SCALES - GENERAL: PAINLEVEL: NO PAIN (0)

## 2023-10-16 NOTE — TELEPHONE ENCOUNTER
"Nurse Triage SBAR    Is this a 2nd Level Triage? NO  Situation: pt reporting urinary symptoms x 3 days. No PCP. Pt refused to different appointment times at the [facility] today due to schedule conflict. Pt tx to central scheduling, discussed UC.     Background: pt reporting symptoms similar to kidney stones in the past, but he is also having incontinence.     Assessment:       Protocol Recommended Disposition:   See in Office Today    Recommendation: schedule appointment today or go to  for eval/tx     Routed to provider    Does the patient meet one of the following criteria for ADS visit consideration? No    1. SYMPTOM: \"What's the main symptom you're concerned about?\" (e.g., frequency, incontinence)      urgency  2. ONSET: \"When did the  urinary symptoms  start?\"      Couple of days ago, 10/14  3. PAIN: \"Is there any pain?\" If Yes, ask: \"How bad is it?\" (Scale: 1-10; mild, moderate, severe)      Dull ache in lower abdomen, 7/10  4. CAUSE: \"What do you think is causing the symptoms?\"      Kidney stones caused same symptoms a few years ago.   5. OTHER SYMPTOMS: \"Do you have any other symptoms?\" (e.g., blood in urine, fever, flank pain, pain with urination)      Incontinence, denies fever, noted to have blood in urine, dark red (merlot)     Reason for Disposition   Urinating more frequently than usual (i.e., frequency)    Additional Information   Negative: Shock suspected (e.g., cold/pale/clammy skin, too weak to stand, low BP, rapid pulse)   Negative: Sounds like a life-threatening emergency to the triager   Negative: Followed a female genital area injury (e.g., labia, vagina, vulva)   Negative: Followed a male genital area injury (penis, scrotum)   Negative: Vaginal discharge   Negative: Pus (white, yellow) or bloody discharge from end of penis   Negative: Pain or burning with passing urine (urination) and pregnant   Negative: Pain or burning with passing urine (urination) and female   Negative: Pain or " burning with passing urine (urination) and male   Negative: Pain or itching in the vulvar area   Negative: Pain in scrotum is main symptom   Negative: Blood in the urine is main symptom   Negative: Symptoms arising from use of a urinary catheter (e.g., Coude, Pastrana)   Negative: Unable to urinate (or only a few drops) > 4 hours and bladder feels very full (e.g., palpable bladder or strong urge to urinate)   Negative: Decreased urination and drinking very little and dehydration suspected (e.g., dark urine, no urine > 12 hours, very dry mouth, very lightheaded)   Negative: Patient sounds very sick or weak to the triager   Negative: Fever > 100.4 F  (38.0 C)   Negative: Side (flank) or lower back pain present   Negative: Bad or foul-smelling urine    Protocols used: Urinary Symptoms-A-OH

## 2023-10-16 NOTE — PROGRESS NOTES
"  Assessment & Plan     Urinary symptom or sign [R39.9]   Comment: Due to sudden onset and concern for urinary retention, I requested ADS consult. I spoke to them and they are able to accept him. Pt and wife agreeable to this plan.   Plan:   - UA Macroscopic with reflex to Microscopic and Culture - Lab Collect; Future  - UA Macroscopic with reflex to Microscopic and Culture - Lab Collect  - UA Microscopic with Reflex to Culture    Urinary incontinence, unspecified type  Per above.    Benign prostatic hyperplasia with urinary frequency  Per above.       JOY Chong CNP  [facility] DOROTEO Dumont is a 72 year old, presenting for the following health issues:  No chief complaint on file.      History of Present Illness       Reason for visit:  Abdomibal pain; urinary incontinence  Symptom onset:  1-3 days ago  Symptom intensity:  Moderate  Symptom progression:  Staying the same  Had these symptoms before:  Yes  Has tried/received treatment for these symptoms:  No    He eats 0-1 servings of fruits and vegetables daily.He consumes 0 sweetened beverage(s) daily.He exercises with enough effort to increase his heart rate 9 or less minutes per day.  He exercises with enough effort to increase his heart rate 3 or less days per week.   He is taking medications regularly.       Problem abruptly started 1 week ago with difficulty initiating stream. Describes voiding as \"short spurts\" with 5-6 \"spurts\" in a sitting. Has had this issue 2-3 times in the past year, one time with hematuria. Also reporting new incontinence beginning Saturday, with daily episodes over the past few days. Describes sense of urgency with this. Denies dysuria, nocturia, scrotal swelling, fever, chills. Per pt no hx of cystoscopy. Reports using flomax historically, but stopped this in 2022.    Additionally reporting dull ache in abdomen, 7/10. Denies flank pain at this time or historically with nephrolithiasis. Denies " "constipation.     Pt did report having a fall Sunday afternoon, attributes this to Parkinsons.       Review of Systems   Constitutional:  Negative for chills and fever.   Genitourinary:  Positive for difficulty urinating and urgency. Negative for dysuria, flank pain and scrotal swelling.   Detailed as above         Objective    /80 (BP Location: Left arm, Patient Position: Sitting, Cuff Size: Adult Regular)   Pulse 75   Temp 97.6  F (36.4  C) (Oral)   Resp 16   Ht 1.854 m (6' 1\")   Wt 77.1 kg (170 lb)   SpO2 98%   BMI 22.43 kg/m    There is no height or weight on file to calculate BMI.  Physical Exam  Abdominal:      General: Abdomen is flat. There is no distension.      Palpations: Abdomen is soft. There is no mass.      Tenderness: There is no abdominal tenderness.   Neurological:      Mental Status: He is alert.      Comments: tremors   Psychiatric:         Mood and Affect: Affect is flat.            Results for orders placed or performed in visit on 10/16/23 (from the past 24 hour(s))   UA Macroscopic with reflex to Microscopic and Culture - Lab Collect    Specimen: Urine, Midstream   Result Value Ref Range    Color Urine Yellow Colorless, Straw, Light Yellow, Yellow    Appearance Urine Clear Clear    Glucose Urine Negative Negative mg/dL    Bilirubin Urine Negative Negative    Ketones Urine Negative Negative mg/dL    Specific Gravity Urine 1.025 1.003 - 1.035    Blood Urine Negative Negative    pH Urine 5.5 5.0 - 7.0    Protein Albumin Urine 30 (A) Negative mg/dL    Urobilinogen Urine 0.2 0.2, 1.0 E.U./dL    Nitrite Urine Negative Negative    Leukocyte Esterase Urine Negative Negative   UA Microscopic with Reflex to Culture   Result Value Ref Range    Bacteria Urine Few (A) None Seen /HPF    RBC Urine 0-2 0-2 /HPF /HPF    WBC Urine 0-5 0-5 /HPF /HPF    Squamous Epithelials Urine Few (A) None Seen /LPF    Narrative    Urine Culture not indicated         I saw this patient in collaboration with Tres " Delonte      I was present with the APRN/PA student who participated in the service and in the documentation of the services provided. I have verified the history and personally performed the physical exam and medical decision making, as documented by the student and edited by me.     Nivia Curry, JOY, DEVIN Martel NP Student

## 2023-10-16 NOTE — PATIENT INSTRUCTIONS
Mirabegron 25 mg daily    Timed voiding    Follow up with primary care and/or urology in 1-2 week for recheck or sooner as needed

## 2023-10-16 NOTE — PROGRESS NOTES
Assessment & Plan     (R39.15) Urinary urgency  (primary encounter diagnosis)  Plan: Adult Urology  Referral, mirabegron         (MYRBETRIQ) 25 MG 24 hr tablet    UA negative  Ongoing symptoms for 1-2 years now worsening with incontinence  Likely from Parkinsons disease's effect on urination  Advise mirabegron 25 mg daily and timed voiding  Follow up with urology and/or pcp in 1-2 week for recheck      81 minutes spent by me on the date of the encounter doing chart review, history and exam, documentation and further activities per the note       See Patient Instructions    No follow-ups on file.    Daniel Fuller MD  [facility] DOROTEO Dumont is a 72 year old, presenting for the following health issues:  Urinary Problem      HPI     Genitourinary - Male  Onset/Duration: incontinence started Saturday morning, urinary symptoms for 1 week  Description:   Dysuria (painful urination): No}  Hematuria (blood in urine): YES 1x  Frequency: YES  Waking at night to urinate: YES  Hesitancy (delay in urine): YES  Retention (unable to empty): unknown  Decrease in urinary flow: YES  Incontinence: YES  Progression of Symptoms:  same  Accompanying Signs & Symptoms:  Fever: YES  Back/Flank pain: No   Urethral discharge: No  Testicle lumps/masses/pain: No  Nausea and/or vomiting: No  Abdominal pain: YES lower midline abd pain, unintentional wt loss: 20 lbs in 2 years due to lack of appetite  History:   History of frequent UTI s: No  History of kidney stones: YES  History of hernias: No  Personal or Family history of Prostate problems: No  Sexually active: No  Precipitating or alleviating factors: None  Therapies tried and outcome: Tylenol without relief    This is a 73 yo man with a history of Parksinson's Disease for 14 years who presents to the clinic today with urinary urgency.  Has had this for years - up at night 4-5 times.  Now urgency is somewhat worse and has  incontinence before he gets to the bathroom.  No fever or chills.  No abdominal, flank or back pain.  No pain with urination.  No n/v.  Has constipation but no change recently.  No blood in stool.  One episode of blood in urine in the past month.    Review of Systems   Constitutional, HEENT, cardiovascular, pulmonary, GI, , musculoskeletal, neuro, skin, endocrine and psych systems are negative, except as otherwise noted.      Objective    /80   Pulse 75   Temp 97.6  F (36.4  C)   Wt 77.1 kg (170 lb)   SpO2 98%   BMI 22.43 kg/m    Body mass index is 22.43 kg/m .  Physical Exam   GENERAL: healthy, alert and no distress  EYES: Eyes grossly normal to inspection, PERRL and conjunctivae and sclerae normal  HENT: ear canals and TM's normal, nose and mouth without ulcers or lesions  NECK: no adenopathy, no asymmetry, masses, or scars and thyroid normal to palpation  RESP: lungs clear to auscultation - no rales, rhonchi or wheezes  CV: regular rate and rhythm, normal S1 S2, no S3 or S4, no murmur, click or rub, no peripheral edema and peripheral pulses strong  ABDOMEN: soft, nontender, no hepatosplenomegaly, no masses and bowel sounds normal  MS: no gross musculoskeletal defects noted, no edema  SKIN: no suspicious lesions or rashes  NEURO: Normal strength and tone, mentation intact and speech normal  PSYCH: mentation appears normal, affect normal/bright  RECTAL - prostate feels symmetric and normal size.    Bladder scan 38 ml    Results for orders placed or performed in visit on 10/16/23   UA Macroscopic with reflex to Microscopic and Culture - Lab Collect     Status: Abnormal    Specimen: Urine, Midstream   Result Value Ref Range    Color Urine Yellow Colorless, Straw, Light Yellow, Yellow    Appearance Urine Clear Clear    Glucose Urine Negative Negative mg/dL    Bilirubin Urine Negative Negative    Ketones Urine Negative Negative mg/dL    Specific Gravity Urine 1.025 1.003 - 1.035    Blood Urine Negative  Negative    pH Urine 5.5 5.0 - 7.0    Protein Albumin Urine 30 (A) Negative mg/dL    Urobilinogen Urine 0.2 0.2, 1.0 E.U./dL    Nitrite Urine Negative Negative    Leukocyte Esterase Urine Negative Negative   UA Microscopic with Reflex to Culture     Status: Abnormal   Result Value Ref Range    Bacteria Urine Few (A) None Seen /HPF    RBC Urine 0-2 0-2 /HPF /HPF    WBC Urine 0-5 0-5 /HPF /HPF    Squamous Epithelials Urine Few (A) None Seen /LPF    Narrative    Urine Culture not indicated

## 2023-10-24 ENCOUNTER — ANCILLARY PROCEDURE (OUTPATIENT)
Dept: CARDIOLOGY | Facility: CLINIC | Age: 73
End: 2023-10-24
Attending: INTERNAL MEDICINE
Payer: COMMERCIAL

## 2023-10-24 DIAGNOSIS — Z95.0 CARDIAC PACEMAKER IN SITU: ICD-10-CM

## 2023-10-24 DIAGNOSIS — I44.2 ATRIOVENTRICULAR BLOCK, COMPLETE (H): ICD-10-CM

## 2023-10-24 LAB
MDC_IDC_LEAD_CONNECTION_STATUS: NORMAL
MDC_IDC_LEAD_CONNECTION_STATUS: NORMAL
MDC_IDC_LEAD_IMPLANT_DT: NORMAL
MDC_IDC_LEAD_IMPLANT_DT: NORMAL
MDC_IDC_LEAD_LOCATION: NORMAL
MDC_IDC_LEAD_LOCATION: NORMAL
MDC_IDC_LEAD_LOCATION_DETAIL_1: NORMAL
MDC_IDC_LEAD_LOCATION_DETAIL_1: NORMAL
MDC_IDC_LEAD_MFG: NORMAL
MDC_IDC_LEAD_MFG: NORMAL
MDC_IDC_LEAD_MODEL: NORMAL
MDC_IDC_LEAD_MODEL: NORMAL
MDC_IDC_LEAD_POLARITY_TYPE: NORMAL
MDC_IDC_LEAD_POLARITY_TYPE: NORMAL
MDC_IDC_LEAD_SERIAL: NORMAL
MDC_IDC_LEAD_SERIAL: NORMAL
MDC_IDC_PG_IMPLANT_DTM: NORMAL
MDC_IDC_PG_MFG: NORMAL
MDC_IDC_PG_MODEL: NORMAL
MDC_IDC_PG_SERIAL: NORMAL
MDC_IDC_PG_TYPE: NORMAL
MDC_IDC_SESS_CLINIC_NAME: NORMAL
MDC_IDC_SESS_DTM: NORMAL
MDC_IDC_SESS_TYPE: NORMAL

## 2023-10-24 PROCEDURE — 93288 INTERROG EVL PM/LDLS PM IP: CPT | Performed by: INTERNAL MEDICINE

## 2023-10-24 NOTE — TELEPHONE ENCOUNTER
Patient was seen in clinic today. They were told by [facility] that no referral was received. Called over to [facility] at [phone]. They confirmed their fax number is [phone] and ask that another fax be sent. Refaxed referral. Patient is aware.  RONI SOLARES

## 2023-10-30 DIAGNOSIS — I10 ESSENTIAL HYPERTENSION WITH GOAL BLOOD PRESSURE LESS THAN 140/90: ICD-10-CM

## 2023-10-30 RX ORDER — LISINOPRIL/HYDROCHLOROTHIAZIDE 10-12.5 MG
1 TABLET ORAL DAILY
Qty: 90 TABLET | Refills: 1 | Status: SHIPPED | OUTPATIENT
Start: 2023-10-30 | End: 2024-04-11

## 2023-10-30 RX ORDER — METOPROLOL SUCCINATE 50 MG/1
50 TABLET, EXTENDED RELEASE ORAL DAILY
Qty: 90 TABLET | Refills: 1 | Status: SHIPPED | OUTPATIENT
Start: 2023-10-30 | End: 2024-02-27

## 2023-11-02 ENCOUNTER — OFFICE VISIT (OUTPATIENT)
Dept: UROLOGY | Facility: CLINIC | Age: 73
End: 2023-11-02
Attending: PREVENTIVE MEDICINE
Payer: COMMERCIAL

## 2023-11-02 VITALS — HEART RATE: 71 BPM | OXYGEN SATURATION: 97 % | SYSTOLIC BLOOD PRESSURE: 116 MMHG | DIASTOLIC BLOOD PRESSURE: 73 MMHG

## 2023-11-02 DIAGNOSIS — R35.1 NOCTURIA: Primary | ICD-10-CM

## 2023-11-02 DIAGNOSIS — G20.A1 PARKINSON'S DISEASE, UNSPECIFIED WHETHER DYSKINESIA PRESENT, UNSPECIFIED WHETHER MANIFESTATIONS FLUCTUATE (H): ICD-10-CM

## 2023-11-02 DIAGNOSIS — R39.15 URINARY URGENCY: ICD-10-CM

## 2023-11-02 DIAGNOSIS — R39.9 LOWER URINARY TRACT SYMPTOMS (LUTS): ICD-10-CM

## 2023-11-02 LAB
ALBUMIN UR-MCNC: NEGATIVE MG/DL
APPEARANCE UR: CLEAR
BILIRUB UR QL STRIP: NEGATIVE
COLOR UR AUTO: YELLOW
GLUCOSE UR STRIP-MCNC: NEGATIVE MG/DL
HGB UR QL STRIP: NEGATIVE
KETONES UR STRIP-MCNC: ABNORMAL MG/DL
LEUKOCYTE ESTERASE UR QL STRIP: NEGATIVE
NITRATE UR QL: NEGATIVE
PH UR STRIP: 5.5 [PH] (ref 5–7)
RESIDUAL VOLUME (RV) (EXTERNAL): 8
SP GR UR STRIP: >=1.03 (ref 1–1.03)
UROBILINOGEN UR STRIP-ACNC: 0.2 E.U./DL

## 2023-11-02 PROCEDURE — 99203 OFFICE O/P NEW LOW 30 MIN: CPT | Mod: 25 | Performed by: NURSE PRACTITIONER

## 2023-11-02 PROCEDURE — 81003 URINALYSIS AUTO W/O SCOPE: CPT | Mod: QW | Performed by: NURSE PRACTITIONER

## 2023-11-02 PROCEDURE — 51798 US URINE CAPACITY MEASURE: CPT | Performed by: NURSE PRACTITIONER

## 2023-11-02 RX ORDER — ALFUZOSIN HYDROCHLORIDE 10 MG/1
10 TABLET, EXTENDED RELEASE ORAL DAILY
Qty: 90 TABLET | Refills: 4 | Status: CANCELLED | OUTPATIENT
Start: 2023-11-02

## 2023-11-02 RX ORDER — ALFUZOSIN HYDROCHLORIDE 10 MG/1
10 TABLET, EXTENDED RELEASE ORAL DAILY
Qty: 90 TABLET | Refills: 0 | Status: SHIPPED | OUTPATIENT
Start: 2023-11-02 | End: 2024-09-06

## 2023-11-02 RX ORDER — MIRABEGRON 25 MG/1
25 TABLET, FILM COATED, EXTENDED RELEASE ORAL DAILY
Qty: 90 TABLET | Refills: 0 | Status: SHIPPED | OUTPATIENT
Start: 2023-11-02 | End: 2024-09-06

## 2023-11-02 NOTE — PROGRESS NOTES
Urology Outpatient Visit    Name: Steven Dye    MRN: [id number]   YOB: 1950               Chief Complaint:   Nocturia         Impression and Plan:   Impression / Plan:   Steven Dye is a 72 year old male with nocturia/urge incontinence likely attributable to Parkinsons and/or BPH.      -Emptying well, PVR 8 mL  -UA without sign of infection, no hematuria  -Will trial Alfuzosin   -Continue Myrbetriq   -Will follow up with our office in 3 months for PVR and symptom recheck.     Thank you for the opportunity to participate in the care of Steven Dye.     JOY Wu, CNP  [facility] - Department of Urology  [phone]          History of Present Illness:   Steven Dye is a 72 year old male with Hx of Parkinson's, complete AV block w pacemaker, here accompanied by his wife w CC of nocturia 3-4x/night for ~2 years. Also reports frequency and urge incontinence several times a week. Was started on Mirabegron a couple weeks ago.     PSA 2.02 12/16/2021     History is obtained from patient & EMR          Past Medical History:     Past Medical History:   Diagnosis Date    Complete AV block (H)     [employer] [employer] dual chamber 4/1/2011    Hyperlipidemia     Hypertension     Parkinson disease     Syncope             Past Surgical History:     Past Surgical History:   Procedure Laterality Date    IMPLANT PACEMAKER              Social History:     Social History     Tobacco Use    Smoking status: Never    Smokeless tobacco: Never   Substance Use Topics    Alcohol use: Yes     Comment: glass of wine occ            Family History:     Family History   Problem Relation Age of Onset    C.A.D. Mother     C.A.D. Father             Allergies:     Allergies   Allergen Reactions    Penicillins      hives    Statin Drugs [Statins]     Penicillamine Rash            Medications:     Current Outpatient Medications   Medication Sig    aspirin 81 MG tablet Take 81 mg by  mouth daily    carbidopa-levodopa (SINEMET CR)  MG CR tablet Take 1 tablet by mouth At Bedtime    carbidopa-levodopa (SINEMET)  MG tablet TAKE 1&1/2 TABLETS BY MOUTH 4 TIMES DAILY    lisinopril-hydrochlorothiazide (ZESTORETIC) 10-12.5 MG tablet Take 1 tablet by mouth daily    metoprolol succinate ER (TOPROL XL) 50 MG 24 hr tablet Take 1 tablet (50 mg) by mouth daily    mirabegron (MYRBETRIQ) 25 MG 24 hr tablet Take 1 tablet (25 mg) by mouth daily for 30 days    Multiple Vitamins-Minerals (CENTRUM SILVER ADULT 50+ PO)      No current facility-administered medications for this visit.             Review of Systems:    ROS: See HPI for pertinent details.  Remainder of 10-point ROS negative.         Physical Exam:   VS:  T: Data Unavailable    HR: 71    BP: 116/73    RR: Data Unavailable     GEN:  Alert.  NAD. Pt is not diaphoretic.   HEENT:  Sclerae anicteric.    CV:  No obvious jugular venous distension  LUNGS: No respiratory distress, breathing comfortably wo accessory muscle use.  ABD:  ND.    EXT:  Warm, well perfused.  SKIN:  Warm.  Dry.   NEURO:  CN grossly intact.             Data:   All laboratory data reviewed:    Lab Results   Component Value Date    PSA 2.02 12/16/2021    PSA 1.99 06/16/2021     Lab Results   Component Value Date    CR 1.00 12/02/2022    CR 0.98 03/16/2022    CR 0.98 12/16/2021    CR 0.92 06/16/2021    CR 1.39 03/15/2018    CR 1.57 10/30/2015    CR 1.32 04/01/2011     Lab Results   Component Value Date    GFRESTIMATED 79 12/02/2022    GFRESTIMATED 82 03/16/2022    GFRESTIMATED 77 12/16/2021    GFRESTIMATED 83 06/16/2021    GFRESTIMATED 51 03/15/2018    GFRESTIMATED 44 10/30/2015    GFRESTIMATED 55 04/01/2011     Color Urine (no units)   Date Value   10/16/2023 Yellow   03/15/2018 Yellow     Appearance Urine (no units)   Date Value   10/16/2023 Clear   03/15/2018 Clear     Glucose Urine (mg/dL)   Date Value   10/16/2023 Negative   03/15/2018 Negative     Bilirubin Urine (no  units)   Date Value   10/16/2023 Negative   03/15/2018 Negative     Ketones Urine (mg/dL)   Date Value   10/16/2023 Negative   03/15/2018 Negative     Specific Gravity Urine (no units)   Date Value   10/16/2023 1.025   03/15/2018 1.008     pH Urine   Date Value   10/16/2023 5.5   03/15/2018 5.0 pH     Protein Albumin Urine (mg/dL)   Date Value   10/16/2023 30 (A)   03/15/2018 Negative     Urobilinogen Urine (E.U./dL)   Date Value   10/16/2023 0.2     Nitrite Urine (no units)   Date Value   10/16/2023 Negative   03/15/2018 Negative     Leukocyte Esterase Urine (no units)   Date Value   10/16/2023 Negative   03/15/2018 Negative

## 2023-11-02 NOTE — LETTER
11/2/2023       RE: Steven Dye  [address] [address]     Dear Colleague,    Thank you for referring your patient, Steven Dye, to the [facility] UROLOGY CLINIC [place] at [facility]. Please see a copy of my visit note below.      Urology Outpatient Visit    Name: Steven Dye    MRN: [id number]   YOB: 1950               Chief Complaint:   Nocturia         Impression and Plan:   Impression / Plan:   Steven Dye is a 72 year old male with nocturia/urge incontinence likely attributable to Parkinsons and/or BPH.      -Emptying well, PVR 8 mL  -UA without sign of infection, no hematuria  -Will trial Alfuzosin   -Continue Myrbetriq   -Will follow up with our office in 3 months for PVR and symptom recheck.     Thank you for the opportunity to participate in the care of Steven Dye.     JOY Wu, CNP   [facility] - Department of Urology  [phone]          History of Present Illness:   Steven Dye is a 72 year old male with Hx of Parkinson's, complete AV block w pacemaker, here accompanied by his wife w CC of nocturia 3-4x/night for ~2 years. Also reports frequency and urge incontinence several times a week. Was started on Mirabegron a couple weeks ago.     PSA 2.02 12/16/2021     History is obtained from patient & EMR          Past Medical History:     Past Medical History:   Diagnosis Date    Complete AV block (H)     [place] [employer] dual chamber 4/1/2011    Hyperlipidemia     Hypertension     Parkinson disease     Syncope             Past Surgical History:     Past Surgical History:   Procedure Laterality Date    IMPLANT PACEMAKER              Social History:     Social History     Tobacco Use    Smoking status: Never    Smokeless tobacco: Never   Substance Use Topics    Alcohol use: Yes     Comment: glass of wine occ            Family History:     Family History    Problem Relation Age of Onset    C.A.D. Mother     C.A.D. Father             Allergies:     Allergies   Allergen Reactions    Penicillins      hives    Statin Drugs [Statins]     Penicillamine Rash            Medications:     Current Outpatient Medications   Medication Sig    aspirin 81 MG tablet Take 81 mg by mouth daily    carbidopa-levodopa (SINEMET CR)  MG CR tablet Take 1 tablet by mouth At Bedtime    carbidopa-levodopa (SINEMET)  MG tablet TAKE 1&1/2 TABLETS BY MOUTH 4 TIMES DAILY    lisinopril-hydrochlorothiazide (ZESTORETIC) 10-12.5 MG tablet Take 1 tablet by mouth daily    metoprolol succinate ER (TOPROL XL) 50 MG 24 hr tablet Take 1 tablet (50 mg) by mouth daily    mirabegron (MYRBETRIQ) 25 MG 24 hr tablet Take 1 tablet (25 mg) by mouth daily for 30 days    Multiple Vitamins-Minerals (CENTRUM SILVER ADULT 50+ PO)      No current facility-administered medications for this visit.             Review of Systems:    ROS: See HPI for pertinent details.  Remainder of 10-point ROS negative.         Physical Exam:   VS:  T: Data Unavailable    HR: 71    BP: 116/73    RR: Data Unavailable     GEN:  Alert.  NAD. Pt is not diaphoretic.   HEENT:  Sclerae anicteric.    CV:  No obvious jugular venous distension  LUNGS: No respiratory distress, breathing comfortably wo accessory muscle use.  ABD:  ND.    EXT:  Warm, well perfused.  SKIN:  Warm.  Dry.   NEURO:  CN grossly intact.             Data:   All laboratory data reviewed:    Lab Results   Component Value Date    PSA 2.02 12/16/2021    PSA 1.99 06/16/2021     Lab Results   Component Value Date    CR 1.00 12/02/2022    CR 0.98 03/16/2022    CR 0.98 12/16/2021    CR 0.92 06/16/2021    CR 1.39 03/15/2018    CR 1.57 10/30/2015    CR 1.32 04/01/2011     Lab Results   Component Value Date    GFRESTIMATED 79 12/02/2022    GFRESTIMATED 82 03/16/2022    GFRESTIMATED 77 12/16/2021    GFRESTIMATED 83 06/16/2021    GFRESTIMATED 51 03/15/2018    GFRESTIMATED 44  10/30/2015    GFRESTIMATED 55 04/01/2011     Color Urine (no units)   Date Value   10/16/2023 Yellow   03/15/2018 Yellow     Appearance Urine (no units)   Date Value   10/16/2023 Clear   03/15/2018 Clear     Glucose Urine (mg/dL)   Date Value   10/16/2023 Negative   03/15/2018 Negative     Bilirubin Urine (no units)   Date Value   10/16/2023 Negative   03/15/2018 Negative     Ketones Urine (mg/dL)   Date Value   10/16/2023 Negative   03/15/2018 Negative     Specific Gravity Urine (no units)   Date Value   10/16/2023 1.025   03/15/2018 1.008     pH Urine   Date Value   10/16/2023 5.5   03/15/2018 5.0 pH     Protein Albumin Urine (mg/dL)   Date Value   10/16/2023 30 (A)   03/15/2018 Negative     Urobilinogen Urine (E.U./dL)   Date Value   10/16/2023 0.2     Nitrite Urine (no units)   Date Value   10/16/2023 Negative   03/15/2018 Negative     Leukocyte Esterase Urine (no units)   Date Value   10/16/2023 Negative   03/15/2018 Negative

## 2023-11-06 ENCOUNTER — TELEPHONE (OUTPATIENT)
Dept: CARDIOLOGY | Facility: CLINIC | Age: 73
End: 2023-11-06
Payer: COMMERCIAL

## 2023-11-06 NOTE — TELEPHONE ENCOUNTER
Multiple faxes sent to number provided. Numerous attempts to get [place] the information. Pt will have to contact med rec.

## 2023-11-06 NOTE — TELEPHONE ENCOUNTER
Pt's wife has called no less than 3 times today as well as My Chart message. We have faxed numerous times, have called [facility] numerous times, have responded to multiple requests for the same information, we have confirmed fax and phone numbers. At this point, pt will need to follow up with Medical Records. Number given via [employer] message as when I called pt, wife answered but then quit responding. Pratima/RN

## 2023-11-06 NOTE — TELEPHONE ENCOUNTER
[facility] Call Center    Phone Message    May a detailed message be left on voicemail: yes     Reason for Call: Other: Pt's spouse would like to discuss getting medical recors sent out of town for the winter, please reach out to further discuss.     Action Taken: Other: Cardiology    Travel Screening: Not Applicable    Thank you!  [facility]

## 2023-11-06 NOTE — TELEPHONE ENCOUNTER
Spoke to patients wife Hermila to explain process for getting medical records sent to [facility].  She was given both the phone and fax number to the  medical records.  Also explained that the first item that needs to be complete is a release form to let our medical records team release records which wife indicated she has not yet completed.  She will call the team to find out how the form can be sent to them so the process for the release can occur.  Provided direct number to this clinic if she should need more assistance.  SUJATHA Ernandez

## 2023-11-27 ENCOUNTER — ANCILLARY PROCEDURE (OUTPATIENT)
Dept: CARDIOLOGY | Facility: CLINIC | Age: 73
End: 2023-11-27
Attending: INTERNAL MEDICINE
Payer: COMMERCIAL

## 2023-11-27 DIAGNOSIS — Z95.0 CARDIAC PACEMAKER IN SITU: ICD-10-CM

## 2023-11-27 DIAGNOSIS — I44.2 ATRIOVENTRICULAR BLOCK, COMPLETE (H): ICD-10-CM

## 2023-11-27 LAB
MDC_IDC_LEAD_CONNECTION_STATUS: NORMAL
MDC_IDC_LEAD_CONNECTION_STATUS: NORMAL
MDC_IDC_LEAD_IMPLANT_DT: NORMAL
MDC_IDC_LEAD_IMPLANT_DT: NORMAL
MDC_IDC_LEAD_LOCATION: NORMAL
MDC_IDC_LEAD_LOCATION: NORMAL
MDC_IDC_LEAD_LOCATION_DETAIL_1: NORMAL
MDC_IDC_LEAD_LOCATION_DETAIL_1: NORMAL
MDC_IDC_LEAD_MFG: NORMAL
MDC_IDC_LEAD_MFG: NORMAL
MDC_IDC_LEAD_MODEL: NORMAL
MDC_IDC_LEAD_MODEL: NORMAL
MDC_IDC_LEAD_POLARITY_TYPE: NORMAL
MDC_IDC_LEAD_POLARITY_TYPE: NORMAL
MDC_IDC_LEAD_SERIAL: NORMAL
MDC_IDC_LEAD_SERIAL: NORMAL
MDC_IDC_MSMT_BATTERY_STATUS: NORMAL
MDC_IDC_MSMT_LEADCHNL_RA_IMPEDANCE_VALUE: 730 OHM
MDC_IDC_MSMT_LEADCHNL_RA_PACING_THRESHOLD_AMPLITUDE: 0.4 V
MDC_IDC_MSMT_LEADCHNL_RA_PACING_THRESHOLD_PULSEWIDTH: 0.5 MS
MDC_IDC_MSMT_LEADCHNL_RV_IMPEDANCE_VALUE: 730 OHM
MDC_IDC_MSMT_LEADCHNL_RV_PACING_THRESHOLD_AMPLITUDE: 0.8 V
MDC_IDC_MSMT_LEADCHNL_RV_PACING_THRESHOLD_PULSEWIDTH: 0.4 MS
MDC_IDC_PG_IMPLANT_DTM: NORMAL
MDC_IDC_PG_MFG: NORMAL
MDC_IDC_PG_MODEL: NORMAL
MDC_IDC_PG_SERIAL: NORMAL
MDC_IDC_PG_TYPE: NORMAL
MDC_IDC_SESS_CLINIC_NAME: NORMAL
MDC_IDC_SESS_DTM: NORMAL
MDC_IDC_SESS_TYPE: NORMAL
MDC_IDC_SET_BRADY_AT_MODE_SWITCH_MODE: NORMAL
MDC_IDC_SET_BRADY_AT_MODE_SWITCH_RATE: 170 {BEATS}/MIN
MDC_IDC_SET_BRADY_HYSTRATE: NORMAL
MDC_IDC_SET_BRADY_LOWRATE: 60 {BEATS}/MIN
MDC_IDC_SET_BRADY_MAX_SENSOR_RATE: 130 {BEATS}/MIN
MDC_IDC_SET_BRADY_MAX_TRACKING_RATE: 130 {BEATS}/MIN
MDC_IDC_SET_BRADY_MODE: NORMAL
MDC_IDC_SET_BRADY_PAV_DELAY_HIGH: 180 MS
MDC_IDC_SET_BRADY_PAV_DELAY_LOW: 250 MS
MDC_IDC_SET_BRADY_SAV_DELAY_HIGH: 180 MS
MDC_IDC_SET_BRADY_SAV_DELAY_LOW: 250 MS
MDC_IDC_SET_LEADCHNL_RA_PACING_AMPLITUDE: 2 V
MDC_IDC_SET_LEADCHNL_RA_PACING_CAPTURE_MODE: NORMAL
MDC_IDC_SET_LEADCHNL_RA_PACING_POLARITY: NORMAL
MDC_IDC_SET_LEADCHNL_RA_PACING_PULSEWIDTH: 0.5 MS
MDC_IDC_SET_LEADCHNL_RA_SENSING_ADAPTATION_MODE: NORMAL
MDC_IDC_SET_LEADCHNL_RA_SENSING_POLARITY: NORMAL
MDC_IDC_SET_LEADCHNL_RA_SENSING_SENSITIVITY: 0.5 MV
MDC_IDC_SET_LEADCHNL_RV_PACING_AMPLITUDE: 2 V
MDC_IDC_SET_LEADCHNL_RV_PACING_CAPTURE_MODE: NORMAL
MDC_IDC_SET_LEADCHNL_RV_PACING_POLARITY: NORMAL
MDC_IDC_SET_LEADCHNL_RV_PACING_PULSEWIDTH: 0.4 MS
MDC_IDC_SET_LEADCHNL_RV_SENSING_ADAPTATION_MODE: NORMAL
MDC_IDC_SET_LEADCHNL_RV_SENSING_POLARITY: NORMAL
MDC_IDC_SET_LEADCHNL_RV_SENSING_SENSITIVITY: 2.5 MV
MDC_IDC_STAT_AT_DTM_END: NORMAL
MDC_IDC_STAT_AT_DTM_START: NORMAL
MDC_IDC_STAT_AT_MODE_SW_COUNT: 0
MDC_IDC_STAT_AT_MODE_SW_MAX_DURATION: 0 S
MDC_IDC_STAT_BRADY_DTM_END: NORMAL
MDC_IDC_STAT_BRADY_DTM_START: NORMAL
MDC_IDC_STAT_BRADY_RA_PERCENT_PACED: 28 %
MDC_IDC_STAT_BRADY_RV_PERCENT_PACED: 100 %
MDC_IDC_STAT_EPISODE_RECENT_COUNT: 0
MDC_IDC_STAT_EPISODE_RECENT_COUNT_DTM_END: NORMAL
MDC_IDC_STAT_EPISODE_RECENT_COUNT_DTM_START: NORMAL
MDC_IDC_STAT_EPISODE_TYPE: NORMAL

## 2023-11-27 PROCEDURE — 93280 PM DEVICE PROGR EVAL DUAL: CPT | Performed by: INTERNAL MEDICINE

## 2023-12-12 ENCOUNTER — TRANSFERRED RECORDS (OUTPATIENT)
Dept: HEALTH INFORMATION MANAGEMENT | Facility: CLINIC | Age: 73
End: 2023-12-12

## 2024-01-15 ENCOUNTER — ANCILLARY PROCEDURE (OUTPATIENT)
Dept: CARDIOLOGY | Facility: CLINIC | Age: 74
End: 2024-01-15
Attending: INTERNAL MEDICINE
Payer: COMMERCIAL

## 2024-01-15 DIAGNOSIS — I44.2 ATRIOVENTRICULAR BLOCK, COMPLETE (H): ICD-10-CM

## 2024-01-15 DIAGNOSIS — Z95.0 CARDIAC PACEMAKER IN SITU: ICD-10-CM

## 2024-01-15 PROCEDURE — 93280 PM DEVICE PROGR EVAL DUAL: CPT | Performed by: INTERNAL MEDICINE

## 2024-01-19 ENCOUNTER — OFFICE VISIT (OUTPATIENT)
Dept: UROLOGY | Facility: CLINIC | Age: 74
End: 2024-01-19
Payer: COMMERCIAL

## 2024-01-19 VITALS
DIASTOLIC BLOOD PRESSURE: 83 MMHG | BODY MASS INDEX: 22.08 KG/M2 | WEIGHT: 163 LBS | HEIGHT: 72 IN | SYSTOLIC BLOOD PRESSURE: 118 MMHG | OXYGEN SATURATION: 98 % | HEART RATE: 67 BPM

## 2024-01-19 DIAGNOSIS — R39.9 LOWER URINARY TRACT SYMPTOMS (LUTS): Primary | ICD-10-CM

## 2024-01-19 DIAGNOSIS — R39.15 URINARY URGENCY: ICD-10-CM

## 2024-01-19 LAB — RESIDUAL VOLUME (RV) (EXTERNAL): NORMAL

## 2024-01-19 PROCEDURE — 51798 US URINE CAPACITY MEASURE: CPT | Performed by: NURSE PRACTITIONER

## 2024-01-19 PROCEDURE — 99213 OFFICE O/P EST LOW 20 MIN: CPT | Mod: 25 | Performed by: NURSE PRACTITIONER

## 2024-01-19 RX ORDER — ALFUZOSIN HYDROCHLORIDE 10 MG/1
10 TABLET, EXTENDED RELEASE ORAL DAILY
Qty: 90 TABLET | Refills: 4 | Status: SHIPPED | OUTPATIENT
Start: 2024-01-19

## 2024-01-19 RX ORDER — MIRABEGRON 25 MG/1
25 TABLET, FILM COATED, EXTENDED RELEASE ORAL DAILY
Qty: 90 TABLET | Refills: 4 | Status: SHIPPED | OUTPATIENT
Start: 2024-01-19

## 2024-01-19 ASSESSMENT — PAIN SCALES - GENERAL: PAINLEVEL: NO PAIN (0)

## 2024-01-19 NOTE — LETTER
1/19/2024       RE: Steven Dye  [address] [address]     Dear Colleague,    Thank you for referring your patient, Steven Dye, to the [facility] UROLOGY CLINIC [place] at [facility]. Please see a copy of my visit note below.      Urology Outpatient Visit    Name: Steven Dye    MRN: [id number]   YOB: 1950               Chief Complaint:   Nocturia         Impression and Plan:   Impression / Plan:   Steven Dye is a 73 year old male with nocturia/urge incontinence likely attributable to Parkinsons and/or BPH.       -Emptying well, PVR 19 mL today  -Plan to continue alfuzosin 10 mg and Myrbetriq 25 mg.  Refills provided.  -Follow-up in 1 year with UA, PVR, symptom recheck.    Thank you for the opportunity to participate in the care of Steven Dye.     JOY Wu, CNP   [facility] - Department of Urology  [phone]          History of Present Illness:   Steven Dye is a 73 year old male with Hx of Parkinson's, HTN, HLD, complete AV block w pacemaker, who was recently started on Myrbetriq and alfuzosin for management of nocturia and urge incontinence which has been ongoing for approximately 2 years.    History is obtained from patient & EMR         Past Medical History:     Past Medical History:   Diagnosis Date    Complete AV block (H)     [employer] dual chamber 4/1/2011    Hyperlipidemia     Hypertension     Mumps     Parkinson disease     Parkinsons disease     Syncope           Past Surgical History:     Past Surgical History:   Procedure Laterality Date    IMPLANT PACEMAKER            Social History:     Social History     Tobacco Use    Smoking status: Never    Smokeless tobacco: Never   Substance Use Topics    Alcohol use: Yes     Comment: glass of wine occ          Family History:     Family History   Problem Relation Age of Onset    C.A.D. Mother      HETCOR Father           Allergies:     Allergies   Allergen Reactions    Penicillins      hives    Statin Drugs [Statins]     Penicillamine Rash          Medications:     Current Outpatient Medications   Medication Sig    alfuzosin ER (UROXATRAL) 10 MG 24 hr tablet Take 1 tablet (10 mg) by mouth daily    aspirin 81 MG tablet Take 81 mg by mouth daily    carbidopa-levodopa (SINEMET CR)  MG CR tablet Take 1 tablet by mouth At Bedtime    carbidopa-levodopa (SINEMET)  MG tablet TAKE 1&1/2 TABLETS BY MOUTH 4 TIMES DAILY    lisinopril-hydrochlorothiazide (ZESTORETIC) 10-12.5 MG tablet Take 1 tablet by mouth daily    metoprolol succinate ER (TOPROL XL) 50 MG 24 hr tablet Take 1 tablet (50 mg) by mouth daily    mirabegron (MYRBETRIQ) 25 MG 24 hr tablet Take 1 tablet (25 mg) by mouth daily    Multiple Vitamins-Minerals (CENTRUM SILVER ADULT 50+ PO)      No current facility-administered medications for this visit.          Review of Systems:    ROS: See HPI for pertinent details.  Remainder of 10-point ROS negative.         Physical Exam:   VS:  T: Data Unavailable    HR: 67    BP: 118/83    RR: Data Unavailable     GEN:  Alert.  NAD.   HEENT:  Sclerae anicteric.    CV:  No obvious jugular venous distension.  LUNGS: No respiratory distress, breathing comfortably wo accessory muscle use.  ABD:  ND.     SKIN:  Dry. No visible rashes.   NEURO:  CN grossly intact.           Data:   All laboratory data reviewed:    Lab Results   Component Value Date    PSA 2.02 12/16/2021    PSA 1.99 06/16/2021     Lab Results   Component Value Date    CR 1.00 12/02/2022    CR 0.98 03/16/2022    CR 0.98 12/16/2021    CR 0.92 06/16/2021    CR 1.39 03/15/2018    CR 1.57 10/30/2015    CR 1.32 04/01/2011     Lab Results   Component Value Date    GFRESTIMATED 79 12/02/2022    GFRESTIMATED 82 03/16/2022    GFRESTIMATED 77 12/16/2021    GFRESTIMATED 83 06/16/2021    GFRESTIMATED 51 03/15/2018    GFRESTIMATED 44 10/30/2015    GFRESTIMATED  55 04/01/2011     Color Urine (no units)   Date Value   11/02/2023 Yellow   03/15/2018 Yellow     Appearance Urine (no units)   Date Value   11/02/2023 Clear   03/15/2018 Clear     Glucose Urine (mg/dL)   Date Value   11/02/2023 Negative   03/15/2018 Negative     Bilirubin Urine (no units)   Date Value   11/02/2023 Negative   03/15/2018 Negative     Ketones Urine (mg/dL)   Date Value   11/02/2023 Trace (A)   03/15/2018 Negative     Specific Gravity Urine (no units)   Date Value   11/02/2023 >=1.030   03/15/2018 1.008     pH Urine   Date Value   11/02/2023 5.5   03/15/2018 5.0 pH     Protein Albumin Urine (mg/dL)   Date Value   11/02/2023 Negative   03/15/2018 Negative     Urobilinogen Urine (E.U./dL)   Date Value   11/02/2023 0.2     Nitrite Urine (no units)   Date Value   11/02/2023 Negative   03/15/2018 Negative     Leukocyte Esterase Urine (no units)   Date Value   11/02/2023 Negative   03/15/2018 Negative

## 2024-01-19 NOTE — PROGRESS NOTES
Urology Outpatient Visit    Name: Steven Dye    MRN: [id number]   YOB: 1950               Chief Complaint:   Nocturia         Impression and Plan:   Impression / Plan:   Steven yDe is a 73 year old male with nocturia/urge incontinence likely attributable to Parkinsons and/or BPH.       -Emptying well, PVR 19 mL today  -Plan to continue alfuzosin 10 mg and Myrbetriq 25 mg.  Refills provided.  -Follow-up in 1 year with UA, PVR, symptom recheck.    Thank you for the opportunity to participate in the care of Steven Dye.     JOY Wu, CNP  [facility] - Department of Urology  [phone]          History of Present Illness:   Steven Dye is a 73 year old male with Hx of Parkinson's, HTN, HLD, complete AV block w pacemaker, who was recently started on Myrbetriq and alfuzosin for management of nocturia and urge incontinence which has been ongoing for approximately 2 years.    History is obtained from patient & EMR         Past Medical History:     Past Medical History:   Diagnosis Date    Complete AV block (H)     [employer] [employer] dual chamber 4/1/2011    Hyperlipidemia     Hypertension     Mumps     Parkinson disease     Parkinsons disease     Syncope           Past Surgical History:     Past Surgical History:   Procedure Laterality Date    IMPLANT PACEMAKER            Social History:     Social History     Tobacco Use    Smoking status: Never    Smokeless tobacco: Never   Substance Use Topics    Alcohol use: Yes     Comment: glass of wine occ          Family History:     Family History   Problem Relation Age of Onset    C.A.D. Mother     C.A.D. Father           Allergies:     Allergies   Allergen Reactions    Penicillins      hives    Statin Drugs [Statins]     Penicillamine Rash          Medications:     Current Outpatient Medications   Medication Sig    alfuzosin ER (UROXATRAL) 10 MG 24 hr tablet Take 1 tablet (10 mg) by mouth daily    aspirin 81  MG tablet Take 81 mg by mouth daily    carbidopa-levodopa (SINEMET CR)  MG CR tablet Take 1 tablet by mouth At Bedtime    carbidopa-levodopa (SINEMET)  MG tablet TAKE 1&1/2 TABLETS BY MOUTH 4 TIMES DAILY    lisinopril-hydrochlorothiazide (ZESTORETIC) 10-12.5 MG tablet Take 1 tablet by mouth daily    metoprolol succinate ER (TOPROL XL) 50 MG 24 hr tablet Take 1 tablet (50 mg) by mouth daily    mirabegron (MYRBETRIQ) 25 MG 24 hr tablet Take 1 tablet (25 mg) by mouth daily    Multiple Vitamins-Minerals (CENTRUM SILVER ADULT 50+ PO)      No current facility-administered medications for this visit.          Review of Systems:    ROS: See HPI for pertinent details.  Remainder of 10-point ROS negative.         Physical Exam:   VS:  T: Data Unavailable    HR: 67    BP: 118/83    RR: Data Unavailable     GEN:  Alert.  NAD.   HEENT:  Sclerae anicteric.    CV:  No obvious jugular venous distension.  LUNGS: No respiratory distress, breathing comfortably wo accessory muscle use.  ABD:  ND.     SKIN:  Dry. No visible rashes.   NEURO:  CN grossly intact.           Data:   All laboratory data reviewed:    Lab Results   Component Value Date    PSA 2.02 12/16/2021    PSA 1.99 06/16/2021     Lab Results   Component Value Date    CR 1.00 12/02/2022    CR 0.98 03/16/2022    CR 0.98 12/16/2021    CR 0.92 06/16/2021    CR 1.39 03/15/2018    CR 1.57 10/30/2015    CR 1.32 04/01/2011     Lab Results   Component Value Date    GFRESTIMATED 79 12/02/2022    GFRESTIMATED 82 03/16/2022    GFRESTIMATED 77 12/16/2021    GFRESTIMATED 83 06/16/2021    GFRESTIMATED 51 03/15/2018    GFRESTIMATED 44 10/30/2015    GFRESTIMATED 55 04/01/2011     Color Urine (no units)   Date Value   11/02/2023 Yellow   03/15/2018 Yellow     Appearance Urine (no units)   Date Value   11/02/2023 Clear   03/15/2018 Clear     Glucose Urine (mg/dL)   Date Value   11/02/2023 Negative   03/15/2018 Negative     Bilirubin Urine (no units)   Date Value   11/02/2023  Negative   03/15/2018 Negative     Ketones Urine (mg/dL)   Date Value   11/02/2023 Trace (A)   03/15/2018 Negative     Specific Gravity Urine (no units)   Date Value   11/02/2023 >=1.030   03/15/2018 1.008     pH Urine   Date Value   11/02/2023 5.5   03/15/2018 5.0 pH     Protein Albumin Urine (mg/dL)   Date Value   11/02/2023 Negative   03/15/2018 Negative     Urobilinogen Urine (E.U./dL)   Date Value   11/02/2023 0.2     Nitrite Urine (no units)   Date Value   11/02/2023 Negative   03/15/2018 Negative     Leukocyte Esterase Urine (no units)   Date Value   11/02/2023 Negative   03/15/2018 Negative

## 2024-01-19 NOTE — NURSING NOTE
Chief Complaint   Patient presents with    Urinary Frequency    Nocturia     And parkinson disease    Pvr is 19ml done with bladder scan   2 twice a night to urinate   Urgency is less now   Medium flow   Stop and starting is gone now   Little bit of leakage now but less   Elba Leach, CMA

## 2024-02-12 ENCOUNTER — TRANSFERRED RECORDS (OUTPATIENT)
Dept: HEALTH INFORMATION MANAGEMENT | Facility: CLINIC | Age: 74
End: 2024-02-12

## 2024-02-20 ENCOUNTER — TRANSFERRED RECORDS (OUTPATIENT)
Dept: HEALTH INFORMATION MANAGEMENT | Facility: CLINIC | Age: 74
End: 2024-02-20

## 2024-02-23 ENCOUNTER — TRANSFERRED RECORDS (OUTPATIENT)
Dept: HEALTH INFORMATION MANAGEMENT | Facility: CLINIC | Age: 74
End: 2024-02-23

## 2024-02-27 DIAGNOSIS — I10 ESSENTIAL HYPERTENSION WITH GOAL BLOOD PRESSURE LESS THAN 140/90: ICD-10-CM

## 2024-02-27 RX ORDER — METOPROLOL SUCCINATE 50 MG/1
50 TABLET, EXTENDED RELEASE ORAL DAILY
Qty: 90 TABLET | Refills: 0 | Status: SHIPPED | OUTPATIENT
Start: 2024-02-27 | End: 2024-05-30

## 2024-04-10 NOTE — PROGRESS NOTES
[facility]    I had the pleasure of seeing Tim when he came for follow up of heart block and syncope.  This 73 year old saw Dr. Cervantes for his history of:    1. Advanced HB with syncope 2011 - s/p [employer] PPM 4/2011  2. HTN, now with orthostasis  3. HL   4. Parkinson's - sees Dr. Fuller @ [facility]      Last Visit & Interval History:  I had a virtual visit with Tim 10/2022 at which time he and Peg thought things were going well.  He'd had a few falls after standing quickly. He continued to note LE edema and was wearing compression stockings for this and orthostasis. Amlodipine was decreased d/t orthostasis/falls. Annual follow-up with in-office device check recommended.     D/t continued hypotension and unsteadiness, amlodipine was stopped altogether 11/2022.    He established care with a Cardiologist in [place]. He was doing well. Echo was done showing nl valve function and nl VLEF.    Today's Visit:  Tim has continued to have falls and has continued to note very low BPs.  Martha notes BPs are typically 100mmHg or less.    Denies chest pain, pressure, tightness.  Parkinson's continues to limit his activity, but they were thrilled they were able to spend the winter in [facility].    No change in chronic mild LE edema.  No orthopnea/PND    VITALS:  Vitals: BP (!) 70/38   Pulse 88   Ht 1.829 m (6')   Wt 80 kg (176 lb 4.8 oz)   SpO2 96%   BMI 23.91 kg/m      Diagnostic Testing:  Device interrogation today 32% AP and 100% .  Battery <0.5 months  Echo 2/2024 ([place]) LVEF 55-60%. No RWMA. Nl RV. Aortic sclerosis. Nl aorta.  Echo 1/2022 LVEF 50-55%. Grade I DD. No RWMA. Nl RV.Nl valves and nl RVSP  EKG 9/2021 showed ASVP  Echocardiogam 9/2019 showed EF 50-55%. Mild LVH. Nl RV. 1+ TR. RVSP wnl 21mmHg. Borderline dilated root and ascending aorta      Plan:  Generator replacement when reaches SUJATA  Stop lisinopril HCTZ 10/12.5.  Follow-up will be arranged at time of in-office annual device check  following generator replacement    Assessment/Plan:    Advanced Heart Block/Syncope  S/p PPM as above in 2011.  Device interrogation today showed that he continues to have battery status left, and is being watched closely    PLAN:  Continue routine device interrogation  Discussed generator replacement as he is nearing SUJATA.  No solid food 8 hours prior to arrival, then OK for clear liquids up until 2 hours prior to arrival,  then nothing to eat/drink until after procedure.  HOLD supplements/vitamins morning of procedure. Ok to take all other meds with small sip of water  Shower morning of procedure and put on a clean shirt  Home same day after procedure, with a       HTN, orthostasis  Amlodipine reduced 10/2022, stopped all together 11/2022  He has had falls, and BP remains very low    PLAN:  Stop lisinopril HCTZ 10/12.5 mg daily    Oumou Casarez PA-C, MSPAS      No orders of the defined types were placed in this encounter.    No orders of the defined types were placed in this encounter.    Medications Discontinued During This Encounter   Medication Reason    lisinopril-hydrochlorothiazide (ZESTORETIC) 10-12.5 MG tablet          Encounter Diagnoses   Name Primary?    Atrioventricular block, complete (H)     Cardiac pacemaker in situ     Hypertension     Complete AV block (H)     Disorder of lipoid metabolism        CURRENT MEDICATIONS:  Current Outpatient Medications   Medication Sig Dispense Refill    alfuzosin ER (UROXATRAL) 10 MG 24 hr tablet Take 1 tablet (10 mg) by mouth daily 90 tablet 4    alfuzosin ER (UROXATRAL) 10 MG 24 hr tablet Take 1 tablet (10 mg) by mouth daily 90 tablet 0    aspirin 81 MG tablet Take 81 mg by mouth daily      carbidopa-levodopa (SINEMET CR)  MG CR tablet Take 1 tablet by mouth At Bedtime      carbidopa-levodopa (SINEMET)  MG tablet TAKE 1&1/2 TABLETS BY MOUTH 4 TIMES DAILY      metoprolol succinate ER (TOPROL XL) 50 MG 24 hr tablet Take 1 tablet (50 mg) by mouth daily  90 tablet 0    mirabegron (MYRBETRIQ) 25 MG 24 hr tablet Take 1 tablet (25 mg) by mouth daily 90 tablet 4    mirabegron (MYRBETRIQ) 25 MG 24 hr tablet Take 1 tablet (25 mg) by mouth daily 90 tablet 0    Multiple Vitamins-Minerals (CENTRUM SILVER ADULT 50+ PO)          ALLERGIES     Allergies   Allergen Reactions    Penicillins      hives    Statin Drugs [Statins]     Penicillamine Rash         Review of Systems:  Skin:  Negative     Eyes:  Positive for contacts  ENT:  Negative    Respiratory:  Negative for shortness of breath;dyspnea on exertion;cough  Cardiovascular:  Negative for;palpitations;chest pain;dizziness;lightheadedness;fatigue;edema Positive for;edema  Gastroenterology: Negative for melena;hematochezia  Genitourinary:  Negative    Musculoskeletal:  Negative    Neurologic:  Positive for involuntary movement;tremors  Psychiatric:  Negative    Heme/Lymph/Imm:  Negative    Endocrine:  Negative      Physical Exam:  Vitals: BP (!) 70/38   Pulse 88   Ht 1.829 m (6')   Wt 80 kg (176 lb 4.8 oz)   SpO2 96%   BMI 23.91 kg/m      Constitutional:  cooperative, alert and oriented, well developed, well nourished, in no acute distress        Skin:  warm and dry to the touch, no apparent skin lesions or masses noted        Head:  normocephalic, no masses or lesions        Eyes:  pupils equal and round;conjunctivae and lids unremarkable;sclera white;no xanthalasma        ENT:  no pallor or cyanosis, dentition good        Neck:  JVP normal;no carotid bruit        Chest:  normal breath sounds, clear to auscultation, normal A-P diameter, normal symmetry, normal respiratory excursion, no use of accessory muscles        Cardiac: regular rhythm, normal S1/S2, no S3 or S4, apical impulse not displaced, no murmurs, gallops or rubs                  Abdomen:  abdomen soft        Vascular: pulses full and equal                                      Extremities and Back:  no deformities, clubbing, cyanosis, erythema observed         Neurological:    tremor          PAST MEDICAL HISTORY:  Past Medical History:   Diagnosis Date    Complete AV block (H)     [employer] dual chamber 4/1/2011    Hyperlipidemia     Hypertension     Mumps     Parkinson disease     Parkinsons disease     Syncope        PAST SURGICAL HISTORY:  Past Surgical History:   Procedure Laterality Date    IMPLANT PACEMAKER         FAMILY HISTORY:  Family History   Problem Relation Age of Onset    C.A.D. Mother     C.A.D. Father        SOCIAL HISTORY:  Social History     Socioeconomic History    Marital status:    Tobacco Use    Smoking status: Never    Smokeless tobacco: Never   Substance and Sexual Activity    Alcohol use: Yes     Comment: glass of wine occ    Drug use: Not Currently   Other Topics Concern    Special Diet No    Exercise Yes     Comment: walking almost everyday     Social Determinants of Health     Financial Resource Strain: Low Risk  (10/16/2023)    Financial Resource Strain     Within the past 12 months, have you or your family members you live with been unable to get utilities (heat, electricity) when it was really needed?: No   Food Insecurity: Low Risk  (10/16/2023)    Food Insecurity     Within the past 12 months, did you worry that your food would run out before you got money to buy more?: No     Within the past 12 months, did the food you bought just not last and you didn t have money to get more?: No   Transportation Needs: Low Risk  (10/16/2023)    Transportation Needs     Within the past 12 months, has lack of transportation kept you from medical appointments, getting your medicines, non-medical meetings or appointments, work, or from getting things that you need?: No   Housing Stability: Low Risk  (10/16/2023)    Housing Stability     Do you have housing? : Yes     Are you worried about losing your housing?: No

## 2024-04-11 ENCOUNTER — ANCILLARY PROCEDURE (OUTPATIENT)
Dept: CARDIOLOGY | Facility: CLINIC | Age: 74
End: 2024-04-11
Attending: INTERNAL MEDICINE
Payer: COMMERCIAL

## 2024-04-11 ENCOUNTER — OFFICE VISIT (OUTPATIENT)
Dept: CARDIOLOGY | Facility: CLINIC | Age: 74
End: 2024-04-11
Payer: COMMERCIAL

## 2024-04-11 VITALS
HEART RATE: 88 BPM | OXYGEN SATURATION: 96 % | SYSTOLIC BLOOD PRESSURE: 70 MMHG | DIASTOLIC BLOOD PRESSURE: 38 MMHG | BODY MASS INDEX: 23.88 KG/M2 | WEIGHT: 176.3 LBS | HEIGHT: 72 IN

## 2024-04-11 DIAGNOSIS — I44.2 ATRIOVENTRICULAR BLOCK, COMPLETE (H): ICD-10-CM

## 2024-04-11 DIAGNOSIS — Z95.0 CARDIAC PACEMAKER IN SITU: ICD-10-CM

## 2024-04-11 DIAGNOSIS — I44.2 COMPLETE AV BLOCK (H): ICD-10-CM

## 2024-04-11 DIAGNOSIS — E78.9 DISORDER OF LIPOID METABOLISM: ICD-10-CM

## 2024-04-11 DIAGNOSIS — I44.2 ATRIOVENTRICULAR BLOCK, COMPLETE (H): Primary | ICD-10-CM

## 2024-04-11 PROCEDURE — 99214 OFFICE O/P EST MOD 30 MIN: CPT | Mod: 25 | Performed by: PHYSICIAN ASSISTANT

## 2024-04-11 PROCEDURE — 93280 PM DEVICE PROGR EVAL DUAL: CPT | Performed by: INTERNAL MEDICINE

## 2024-04-11 NOTE — PATIENT INSTRUCTIONS
Tim - it was nice to see you and Martha today!    Today we reviewed:    Pacer check today showed still with battery  BPs are getting too low and you've had more falls  Echo in [place] looked great! Normal heart strength and valves    MEDICATION CHANGES:    STOP lisinopril/hydrochlorothiazide  10/12.5 mg daily  This should bring BP up and help reduce falls    RECOMMENDATIONS:    Reviewed plan for Generator Replacement when it's time  No solid food 8 hours prior to arrival, then OK for clear liquids up until 2 hours prior to arrival,  then nothing to eat/drink until after procedure.  HOLD supplements/vitamins morning of procedure. Ok to take all other meds with small sip of water  Shower morning of procedure and put on a clean shirt  Home same day after procedure, with a     FOLLOW UP:    With annual device check ~Summer 2025    IMPORTANT PHONE NUMBERS FOR  [facility] ([place]):    Device nurses: [phone]

## 2024-04-11 NOTE — LETTER
4/11/2024    Angela Gao MD  [address]  [place] [address]    RE: Steven Tandrdaniel VazquezDye       Dear Colleague,     I had the pleasure of seeing Steven Daytondaniel Dye in the [facility].  [facility]    I had the pleasure of seeing Tim when he came for follow up of heart block and syncope.  This 73 year old saw Dr. Cervantes for his history of:    1. Advanced HB with syncope 2011 - s/p [employer] PPM 4/2011  2. HTN, now with orthostasis  3. HL   4. Parkinson's - sees Dr. Fuller @ [facility]      Last Visit & Interval History:  I had a virtual visit with Tim 10/2022 at which time he and Peg thought things were going well.  He'd had a few falls after standing quickly. He continued to note LE edema and was wearing compression stockings for this and orthostasis. Amlodipine was decreased d/t orthostasis/falls. Annual follow-up with in-office device check recommended.     D/t continued hypotension and unsteadiness, amlodipine was stopped altogether 11/2022.    He established care with a Cardiologist in [place]. He was doing well. Echo was done showing nl valve function and nl VLEF.    Today's Visit:  Tim has continued to have falls and has continued to note very low BPs.  Martha notes BPs are typically 100mmHg or less.    Denies chest pain, pressure, tightness.  Parkinson's continues to limit his activity, but they were thrilled they were able to spend the winter in [facility].    No change in chronic mild LE edema.  No orthopnea/PND    VITALS:  Vitals: BP (!) 70/38   Pulse 88   Ht 1.829 m (6')   Wt 80 kg (176 lb 4.8 oz)   SpO2 96%   BMI 23.91 kg/m      Diagnostic Testing:  Device interrogation today 32% AP and 100% .  Battery <0.5 months  Echo 2/2024 ([place]) LVEF 55-60%. No RWMA. Nl RV. Aortic sclerosis. Nl aorta.  Echo 1/2022 LVEF 50-55%. Grade I DD. No RWMA. Nl RV.Nl valves and nl RVSP  EKG 9/2021 showed ASVP  Echocardiogam 9/2019 showed EF 50-55%. Mild LVH. Nl RV. 1+  TR. RVSP wnl 21mmHg. Borderline dilated root and ascending aorta      Plan:  Generator replacement when reaches SUJATA  Stop lisinopril HCTZ 10/12.5.  Follow-up will be arranged at time of in-office annual device check following generator replacement    Assessment/Plan:    Advanced Heart Block/Syncope  S/p PPM as above in 2011.  Device interrogation today showed that he continues to have battery status left, and is being watched closely    PLAN:  Continue routine device interrogation  Discussed generator replacement as he is nearing SUJATA.  No solid food 8 hours prior to arrival, then OK for clear liquids up until 2 hours prior to arrival,  then nothing to eat/drink until after procedure.  HOLD supplements/vitamins morning of procedure. Ok to take all other meds with small sip of water  Shower morning of procedure and put on a clean shirt  Home same day after procedure, with a       HTN, orthostasis  Amlodipine reduced 10/2022, stopped all together 11/2022  He has had falls, and BP remains very low    PLAN:  Stop lisinopril HCTZ 10/12.5 mg daily    Oumou Casarez PA-C, MSPAS      No orders of the defined types were placed in this encounter.    No orders of the defined types were placed in this encounter.    Medications Discontinued During This Encounter   Medication Reason    lisinopril-hydrochlorothiazide (ZESTORETIC) 10-12.5 MG tablet          Encounter Diagnoses   Name Primary?    Atrioventricular block, complete (H)     Cardiac pacemaker in situ     Hypertension     Complete AV block (H)     Disorder of lipoid metabolism        CURRENT MEDICATIONS:  Current Outpatient Medications   Medication Sig Dispense Refill    alfuzosin ER (UROXATRAL) 10 MG 24 hr tablet Take 1 tablet (10 mg) by mouth daily 90 tablet 4    alfuzosin ER (UROXATRAL) 10 MG 24 hr tablet Take 1 tablet (10 mg) by mouth daily 90 tablet 0    aspirin 81 MG tablet Take 81 mg by mouth daily      carbidopa-levodopa (SINEMET CR)  MG CR tablet Take 1  tablet by mouth At Bedtime      carbidopa-levodopa (SINEMET)  MG tablet TAKE 1&1/2 TABLETS BY MOUTH 4 TIMES DAILY      metoprolol succinate ER (TOPROL XL) 50 MG 24 hr tablet Take 1 tablet (50 mg) by mouth daily 90 tablet 0    mirabegron (MYRBETRIQ) 25 MG 24 hr tablet Take 1 tablet (25 mg) by mouth daily 90 tablet 4    mirabegron (MYRBETRIQ) 25 MG 24 hr tablet Take 1 tablet (25 mg) by mouth daily 90 tablet 0    Multiple Vitamins-Minerals (CENTRUM SILVER ADULT 50+ PO)          ALLERGIES     Allergies   Allergen Reactions    Penicillins      hives    Statin Drugs [Statins]     Penicillamine Rash         Review of Systems:  Skin:  Negative     Eyes:  Positive for contacts  ENT:  Negative    Respiratory:  Negative for shortness of breath;dyspnea on exertion;cough  Cardiovascular:  Negative for;palpitations;chest pain;dizziness;lightheadedness;fatigue;edema Positive for;edema  Gastroenterology: Negative for melena;hematochezia  Genitourinary:  Negative    Musculoskeletal:  Negative    Neurologic:  Positive for involuntary movement;tremors  Psychiatric:  Negative    Heme/Lymph/Imm:  Negative    Endocrine:  Negative      Physical Exam:  Vitals: BP (!) 70/38   Pulse 88   Ht 1.829 m (6')   Wt 80 kg (176 lb 4.8 oz)   SpO2 96%   BMI 23.91 kg/m      Constitutional:  cooperative, alert and oriented, well developed, well nourished, in no acute distress        Skin:  warm and dry to the touch, no apparent skin lesions or masses noted        Head:  normocephalic, no masses or lesions        Eyes:  pupils equal and round;conjunctivae and lids unremarkable;sclera white;no xanthalasma        ENT:  no pallor or cyanosis, dentition good        Neck:  JVP normal;no carotid bruit        Chest:  normal breath sounds, clear to auscultation, normal A-P diameter, normal symmetry, normal respiratory excursion, no use of accessory muscles        Cardiac: regular rhythm, normal S1/S2, no S3 or S4, apical impulse not displaced, no  murmurs, gallops or rubs                  Abdomen:  abdomen soft        Vascular: pulses full and equal                                      Extremities and Back:  no deformities, clubbing, cyanosis, erythema observed        Neurological:    tremor          PAST MEDICAL HISTORY:  Past Medical History:   Diagnosis Date    Complete AV block (H)     [employer] dual chamber 4/1/2011    Hyperlipidemia     Hypertension     Mumps     Parkinson disease     Parkinsons disease     Syncope        PAST SURGICAL HISTORY:  Past Surgical History:   Procedure Laterality Date    IMPLANT PACEMAKER         FAMILY HISTORY:  Family History   Problem Relation Age of Onset    C.A.D. Mother     C.A.D. Father        SOCIAL HISTORY:  Social History     Socioeconomic History    Marital status:    Tobacco Use    Smoking status: Never    Smokeless tobacco: Never   Substance and Sexual Activity    Alcohol use: Yes     Comment: glass of wine occ    Drug use: Not Currently   Other Topics Concern    Special Diet No    Exercise Yes     Comment: walking almost everyday     Social Determinants of Health     Financial Resource Strain: Low Risk  (10/16/2023)    Financial Resource Strain     Within the past 12 months, have you or your family members you live with been unable to get utilities (heat, electricity) when it was really needed?: No   Food Insecurity: Low Risk  (10/16/2023)    Food Insecurity     Within the past 12 months, did you worry that your food would run out before you got money to buy more?: No     Within the past 12 months, did the food you bought just not last and you didn t have money to get more?: No   Transportation Needs: Low Risk  (10/16/2023)    Transportation Needs     Within the past 12 months, has lack of transportation kept you from medical appointments, getting your medicines, non-medical meetings or appointments, work, or from getting things that you need?: No   Housing Stability: Low Risk  (10/16/2023)     Housing Stability     Do you have housing? : Yes     Are you worried about losing your housing?: No             Thank you for allowing me to participate in the care of your patient.      Sincerely,     Janki Casarez PA-C     [facility]  cc:   Janki Casarez PA-C  [address]  [place] [id number]

## 2024-05-01 ENCOUNTER — TELEPHONE (OUTPATIENT)
Dept: CARDIOLOGY | Facility: CLINIC | Age: 74
End: 2024-05-01
Payer: COMMERCIAL

## 2024-05-01 NOTE — TELEPHONE ENCOUNTER
[facility] Call Center    Phone Message    May a detailed message be left on voicemail: yes     Reason for Call: Other: Please reach out to schedule remote device check.     Action Taken: Other: Cardiology    Travel Screening: Not Applicable    Thank you!  [facility]

## 2024-05-02 NOTE — TELEPHONE ENCOUNTER
Pt has a [employer] PPM and cannot do remote checks. Pt's PPM battery has been saying <0.5 yrs since 5/2023 and he is dependent. Pt should be having monthly PPM checks. His last check was 4/11/2024. Pt cancelled his check for 5/16/2024.     Called pt, no answer, left VM to call back to device clinic to reschedule his 1 month PPM check.

## 2024-05-02 NOTE — TELEPHONE ENCOUNTER
Received call back from patient.  Scheduled for in-clinic device check on 5/13/24.     SUJATHA Velazquez

## 2024-05-13 ENCOUNTER — ANCILLARY PROCEDURE (OUTPATIENT)
Dept: CARDIOLOGY | Facility: CLINIC | Age: 74
End: 2024-05-13
Attending: INTERNAL MEDICINE
Payer: COMMERCIAL

## 2024-05-13 DIAGNOSIS — I44.2 ATRIOVENTRICULAR BLOCK, COMPLETE (H): ICD-10-CM

## 2024-05-13 DIAGNOSIS — Z95.0 CARDIAC PACEMAKER IN SITU: ICD-10-CM

## 2024-05-13 PROCEDURE — 93280 PM DEVICE PROGR EVAL DUAL: CPT | Performed by: INTERNAL MEDICINE

## 2024-05-30 DIAGNOSIS — I10 ESSENTIAL HYPERTENSION WITH GOAL BLOOD PRESSURE LESS THAN 140/90: ICD-10-CM

## 2024-05-30 RX ORDER — METOPROLOL SUCCINATE 50 MG/1
50 TABLET, EXTENDED RELEASE ORAL DAILY
Qty: 90 TABLET | Refills: 3 | Status: SHIPPED | OUTPATIENT
Start: 2024-05-30

## 2024-06-13 ENCOUNTER — ANCILLARY PROCEDURE (OUTPATIENT)
Dept: CARDIOLOGY | Facility: CLINIC | Age: 74
End: 2024-06-13
Attending: INTERNAL MEDICINE
Payer: COMMERCIAL

## 2024-06-13 DIAGNOSIS — Z95.0 CARDIAC PACEMAKER IN SITU: ICD-10-CM

## 2024-06-13 DIAGNOSIS — I44.2 ATRIOVENTRICULAR BLOCK, COMPLETE (H): ICD-10-CM

## 2024-07-16 ENCOUNTER — ANCILLARY PROCEDURE (OUTPATIENT)
Dept: CARDIOLOGY | Facility: CLINIC | Age: 74
End: 2024-07-16
Attending: INTERNAL MEDICINE
Payer: COMMERCIAL

## 2024-07-16 DIAGNOSIS — Z45.010 PACEMAKER AT END OF BATTERY LIFE: Primary | ICD-10-CM

## 2024-07-16 DIAGNOSIS — Z95.0 CARDIAC PACEMAKER IN SITU: ICD-10-CM

## 2024-07-16 DIAGNOSIS — I44.2 ATRIOVENTRICULAR BLOCK, COMPLETE (H): ICD-10-CM

## 2024-07-16 DIAGNOSIS — I44.2 COMPLETE AV BLOCK (H): ICD-10-CM

## 2024-07-16 NOTE — PROGRESS NOTES
Heart Care Device Change-Out Checklist (SUJATA Checklist)    Device Data  Pacemaker    :  [employer]  Model:  S606 Altrua 60 EL  Serial Number:  [id number]  Implant location: Left Chest    Implant Date: 4/1/2011  SUJATA Date:  6/15/24  Device Diagnosis:  CHB    Device Alert(s):  No    Lead Data   Last Interrogation Date: 6/13/24    Atrium: Guidant 4135 [address]rus, SN: [id number] implanted on 4/1/2011   Lead Imp:  670 Ohms, Pacing Threshold:  0.5 V @ 0.4 ms, and Sensing Threshold:  >3    Right Ventricle: Guidant 4136, SN: [id number], implanted on 4/1/2011   Lead Imp:  580 Ohms, Pacing Threshold:  0.7 V @  0.4 ms, and Sensing Threshold:  >12   Shock Imp: NA      Old Leads Present/Abandoned: No    Lead Alert(s):  No    Lead Issues/Concerns: No    Diagnostic Information  Intrinsic Rhythm:  SR in the 80's with CHB and no junctional escape at VVI 40     Atrial Fibrillation:  No  Takes Anticoagulant or LAAO? No  CHADS-score: NA    Pacing Percentages  Atrial Pacing 32% and Ventricle Pacing 100%  Pacemaker Dependent? Yes      Ejection Fraction  Last EF Date:  2/20/24    By Echocardiogram  Last EF Measurement:  55-60%      Special Instructions/Timeframe for change-out:  none    Routed to EP:  NA    Device RN: SUJATHA Lawrence    Device Clinic pre-procedure medication holds/changes:    Anticoagulation: No   [id number] Score: NA  -INR check needed?: NA    Oral diabetes meds: NO  Insulin: NO    SGLT2 Inhibitors: NO  - Invokana (canagliflozin), Farxiga (dapagliflozin), Jardiance (empagliflozin), Steglatro (ertugliflozin), Synjardy (empagliflozin/metformin)  - hold 3-4 days prior to procedure    GLP-1 Agonists: NO  - Byetta (exenitide), Victoza (liraglutide), Ozempic, Wegovy, Rybelsus (semaglutide), Trulicity (dulaglutide), Mounjaro (tirzepatide), Bydureon (Exenatide ER), Adlyxin (Lixisendatide)   - (Weekly dosing, hold GLP-1 agonists 7 days before procedure)  - (Daily or BID dosing, hold GLP-1 agonists day before and day of  procedure)  - (Oral semaglutide, hold 7 days before procedure due to long half-life)    Diuretic: NO    Contrast allergy: NO        Checklist:  - entered orders for H&P and procedure- done  - entered orders for device checks post procedure-done  - cancelled any old device check appts and orders- done  - sent message to scheduling (Cherrie) to set up appointments (H&P, procedure, 1 week check)  -done

## 2024-08-11 ENCOUNTER — HEALTH MAINTENANCE LETTER (OUTPATIENT)
Age: 74
End: 2024-08-11

## 2024-08-30 ENCOUNTER — TELEPHONE (OUTPATIENT)
Dept: CARDIOLOGY | Facility: CLINIC | Age: 74
End: 2024-08-30
Payer: COMMERCIAL

## 2024-08-30 DIAGNOSIS — Z95.0 CARDIAC PACEMAKER IN SITU: Primary | ICD-10-CM

## 2024-08-30 DIAGNOSIS — Z45.010 PACEMAKER AT END OF BATTERY LIFE: ICD-10-CM

## 2024-08-30 NOTE — PROGRESS NOTES
Pre-procedure instructions for device implant/explant/upgrade/gen change on 9/9/24 at 1400:     -Review arrival time of 1200 and location.     NPO 8 hours prior to arrival time (starting at 4:00am)  May have clear liquids 2 hours prior to arrival time (up until 10:00am )    -Shower the morning of the procedure, and then put on a clean shirt in order to help prevent infection.     -Pt to call [facility] at [phone] if pt has any new COVID like symptoms or if feeling unwell the evening prior or AM of procedure.      -Post-procedure transportation and 24 hours monitoring set up. Please call before coming in if plans change.  With limited bed availability due to COVID, overnight hospital stays will be allowed for clinical reasons only.    -No driving for 24 hours post procedure due to sedation.     MRSA history?: NA  Allergy to PCN or ancef?: PCN. Reaction: blotchy rash on torso that traveled up neck back in the 70's.   Contrast allergy?: NA          Pt verbalized understanding of instructions.

## 2024-09-03 RX ORDER — SODIUM CHLORIDE 450 MG/100ML
INJECTION, SOLUTION INTRAVENOUS CONTINUOUS
Status: CANCELLED | OUTPATIENT
Start: 2024-09-03

## 2024-09-03 RX ORDER — CLINDAMYCIN PHOSPHATE 900 MG/50ML
900 INJECTION, SOLUTION INTRAVENOUS
Status: CANCELLED | OUTPATIENT
Start: 2024-09-03

## 2024-09-03 RX ORDER — LIDOCAINE 40 MG/G
CREAM TOPICAL
Status: CANCELLED | OUTPATIENT
Start: 2024-09-03

## 2024-09-06 ENCOUNTER — TELEPHONE (OUTPATIENT)
Dept: MEDSURG UNIT | Facility: CLINIC | Age: 74
End: 2024-09-06

## 2024-09-06 ENCOUNTER — OFFICE VISIT (OUTPATIENT)
Dept: CARDIOLOGY | Facility: CLINIC | Age: 74
End: 2024-09-06
Payer: COMMERCIAL

## 2024-09-06 VITALS
DIASTOLIC BLOOD PRESSURE: 73 MMHG | HEART RATE: 78 BPM | BODY MASS INDEX: 23.89 KG/M2 | WEIGHT: 176.4 LBS | OXYGEN SATURATION: 97 % | HEIGHT: 72 IN | SYSTOLIC BLOOD PRESSURE: 111 MMHG

## 2024-09-06 DIAGNOSIS — I44.2 COMPLETE AV BLOCK (H): ICD-10-CM

## 2024-09-06 DIAGNOSIS — Z45.010 PACEMAKER AT END OF BATTERY LIFE: ICD-10-CM

## 2024-09-06 PROCEDURE — 99215 OFFICE O/P EST HI 40 MIN: CPT | Performed by: PHYSICIAN ASSISTANT

## 2024-09-06 NOTE — PATIENT INSTRUCTIONS
Tim - it was nice to see you and Martha today!    Today we reviewed:    BP is higher after stopping the lisinopril/hydrochlorothiazide ... This is good!    Generator replacement as planned    MEDICATION CHANGES:    NONE    RECOMMENDATIONS:    Generator replacement on Monday  Arrive 1200 noon on 9/9  No solid food after 4 AM, then OK for clear liquids only until 10:00 AM. Nothing to eat/drink from 10 AM until after procedure.  Will take all medications morning of procedure, holding all supplements/vitamins  Shower the morning of the procedure, and then put on a clean shirt in order to help prevent infection.   Home same day with  and no driving for at least 24 hours given sedation      FOLLOW UP:    As determined by Device Clinic    IMPORTANT PHONE NUMBERS FOR  HEART [facility] ([place]):    Device nurses: [phone]  On-Call MD: [phone]

## 2024-09-06 NOTE — LETTER
9/6/2024    Angela Gao MD  [address]  [place] [address]    RE: Steven Dye       Dear Colleague,     I had the pleasure of seeing Steven Dye in the [facility].  [facility]    I had the pleasure of seeing Tim when he came for follow up of heart block and syncope.  This 73 year old saw Dr. Cervantes for his history of:    1. Advanced HB with syncope 2011 - s/p [employer] PPM 4/2011.  SUJATA reached 6/15/2024  2. HTN, now with orthostasis  3. HL   4. Parkinson's - sees Dr. Fuller @ [facility]      Last Visit & Interval History:  I had a gym 4/2024 at which time he had continued to have falls, and Martha noted that BPs were typically  <100mmHg.  I stopped lisinopril/HCTZ and we recommended continued follow-up with his pacer given that he was nearing SUJATA.      He subsequently reached SUJATA 6/15/2024 and has been scheduled for generator replacement 9/9    Today's Visit:  Martha notes BPs have been a bit higher since we stopped lisinopril/hydrochlorothiazide, with most BPs now >100mmHg.   Still having falls, probably 2 in the last few months. No injury.    No c/o edema, orthopnea, PND.    Denies palpitations, dizziness, lightheadedness.     VITALS:  Vitals: /73   Pulse 78   Ht 1.829 m (6')   Wt 80 kg (176 lb 6.4 oz)   SpO2 97%   BMI 23.92 kg/m      Diagnostic Testing:  Device interrogation today 32% AP and 100% .  Battery <0.5 months  Echo 2/2024 ([place]) LVEF 55-60%. No RWMA. Nl RV. Aortic sclerosis. Nl aorta.  Echo 1/2022 LVEF 50-55%. Grade I DD. No RWMA. Nl RV.Nl valves and nl RVSP  EKG 9/2021 showed ASVP  Echocardiogam 9/2019 showed EF 50-55%. Mild LVH. Nl RV. 1+ TR. RVSP wnl 21mmHg. Borderline dilated root and ascending aorta      Plan:  PPM generator replacement as planned 9/9/2024  Follow-up arranged with Device   6 m follow-up     Assessment/Plan:    Advanced Heart Block/Syncope  S/p PPM as above in 2011.  Reached SUJATA 6/15/2024 and  now set up for generator replacement    PLAN:  [employer] generator replacement scheduled 9/9/2024. We discussed the risks, benefits and indications of generator replacement, including but not limited to use of conscious sedation including need for a , discomfort, device malfunction and/or recall, and infection requiring explantation of the device and long term antibiotics.  No revision of leads as expected, with decreased risk of peripheral vessel injury, pneumothorax, cardiac puncture and/or tamponade.  We also briefly discussed follow up expectations and restrictions following the procedure. The patient voiced understanding and is willing to proceed.  A consent form will be signed by the procedural physician.  Arrive 1200 noon on 9/9  No solid food after 4 AM, then OK for clear liquids only until 10:00 AM. Nothing to eat/drink from 10 AM until after procedure.  Will take all medications morning of procedure, holding all supplements/vitamins  Shower the morning of the procedure, and then put on a clean shirt in order to help prevent infection.   Home same day with  and no driving for at least 24 hours given sedation    Device clinic will arrange follow-up      HTN, orthostasis  Amlodipine reduced 10/2022, stopped all together 11/2022.  Lisinopril/HCTZ discontinued at last visit 4/2024 and BPs have been higher    PLAN:  Continue fall precautions    Oumou Casarez PA-C, MSPAS      No orders of the defined types were placed in this encounter.    No orders of the defined types were placed in this encounter.    Medications Discontinued During This Encounter   Medication Reason     alfuzosin ER (UROXATRAL) 10 MG 24 hr tablet Duplicate Therapy (No AVS / No eCancel)     mirabegron (MYRBETRIQ) 25 MG 24 hr tablet Duplicate Therapy (No AVS / No eCancel)           Encounter Diagnoses   Name Primary?     Complete AV block (H)      Pacemaker at end of battery life          CURRENT MEDICATIONS:  Current  Outpatient Medications   Medication Sig Dispense Refill     alfuzosin ER (UROXATRAL) 10 MG 24 hr tablet Take 1 tablet (10 mg) by mouth daily 90 tablet 4     aspirin 81 MG tablet Take 81 mg by mouth daily       carbidopa-levodopa (SINEMET CR)  MG CR tablet Take 1 tablet by mouth At Bedtime       carbidopa-levodopa (SINEMET)  MG tablet TAKE 1&1/2 TABLETS BY MOUTH 4 TIMES DAILY       metoprolol succinate ER (TOPROL XL) 50 MG 24 hr tablet Take 1 tablet (50 mg) by mouth daily 90 tablet 3     mirabegron (MYRBETRIQ) 25 MG 24 hr tablet Take 1 tablet (25 mg) by mouth daily 90 tablet 4     Multiple Vitamins-Minerals (CENTRUM SILVER ADULT 50+ PO)          ALLERGIES     Allergies   Allergen Reactions     Penicillins      hives     Statin Drugs [Statins]      Penicillamine Rash         Review of Systems:  Skin:  Negative     Eyes:  Positive for contacts  ENT:  Negative    Respiratory:  Negative for shortness of breath;dyspnea on exertion;cough  Cardiovascular:  Negative for;palpitations;chest pain;dizziness;fatigue;edema lightheadedness;Positive for  Gastroenterology: Negative for melena;hematochezia  Genitourinary:  Negative    Musculoskeletal:  Negative    Neurologic:  Positive for involuntary movement;tremors  Psychiatric:  Negative    Heme/Lymph/Imm:  Negative    Endocrine:  Negative      Physical Exam:  Vitals: /73   Pulse 78   Ht 1.829 m (6')   Wt 80 kg (176 lb 6.4 oz)   SpO2 97%   BMI 23.92 kg/m      Constitutional:  cooperative, alert and oriented, well developed, well nourished, in no acute distress        Skin:  warm and dry to the touch, no apparent skin lesions or masses noted        Head:  normocephalic, no masses or lesions        Eyes:  pupils equal and round;conjunctivae and lids unremarkable;sclera white;no xanthalasma        ENT:  no pallor or cyanosis, dentition good        Neck:  JVP normal;no carotid bruit        Chest:  normal breath sounds, clear to auscultation, normal A-P diameter,  normal symmetry, normal respiratory excursion, no use of accessory muscles        Cardiac: regular rhythm, normal S1/S2, no S3 or S4, apical impulse not displaced, no murmurs, gallops or rubs                  Abdomen:  abdomen soft        Vascular: pulses full and equal                                      Extremities and Back:  no deformities, clubbing, cyanosis, erythema observed        Neurological:    tremor          PAST MEDICAL HISTORY:  Past Medical History:   Diagnosis Date     Complete AV block (H)     [employer] dual chamber 4/1/2011     Hyperlipidemia      Hypertension      Mumps      Parkinson disease (H)      Parkinsons disease (H)      Syncope        PAST SURGICAL HISTORY:  Past Surgical History:   Procedure Laterality Date     IMPLANT PACEMAKER         FAMILY HISTORY:  Family History   Problem Relation Age of Onset     C.A.D. Mother      C.A.D. Father        SOCIAL HISTORY:  Social History     Socioeconomic History     Marital status:      Spouse name: None     Number of children: None     Years of education: None     Highest education level: None   Tobacco Use     Smoking status: Never     Smokeless tobacco: Never   Substance and Sexual Activity     Alcohol use: Yes     Comment: glass of wine occ     Drug use: Not Currently   Other Topics Concern     Special Diet No     Exercise Yes     Comment: walking almost everyday     Social Determinants of Health     Financial Resource Strain: Low Risk  (10/16/2023)    Financial Resource Strain      Within the past 12 months, have you or your family members you live with been unable to get utilities (heat, electricity) when it was really needed?: No   Food Insecurity: Low Risk  (10/16/2023)    Food Insecurity      Within the past 12 months, did you worry that your food would run out before you got money to buy more?: No      Within the past 12 months, did the food you bought just not last and you didn t have money to get more?: No   Transportation  Needs: Low Risk  (10/16/2023)    Transportation Needs      Within the past 12 months, has lack of transportation kept you from medical appointments, getting your medicines, non-medical meetings or appointments, work, or from getting things that you need?: No   Housing Stability: Low Risk  (10/16/2023)    Housing Stability      Do you have housing? : Yes      Are you worried about losing your housing?: No             Thank you for allowing me to participate in the care of your patient.      Sincerely,     Janki Casarez PA-C     [facility]  cc:   Tonja Kim MD  [address]  [place] [address]

## 2024-09-06 NOTE — TELEPHONE ENCOUNTER
Chart reviewed for upcoming appt.  CSE and nursing orders in [facility] for procedure.

## 2024-09-06 NOTE — PROGRESS NOTES
[facility]    I had the pleasure of seeing Tim when he came for follow up of heart block and syncope.  This 73 year old saw Dr. Cervantes for his history of:    1. Advanced HB with syncope 2011 - s/p [employer] PPM 4/2011.  SUJATA reached 6/15/2024  2. HTN, now with orthostasis  3. HL   4. Parkinson's - sees Dr. Fuller @ [facility]      Last Visit & Interval History:  I had a gym 4/2024 at which time he had continued to have falls, and Martha noted that BPs were typically  <100mmHg.  I stopped lisinopril/HCTZ and we recommended continued follow-up with his pacer given that he was nearing SUJATA.      He subsequently reached SUJATA 6/15/2024 and has been scheduled for generator replacement 9/9    Today's Visit:  Martha notes BPs have been a bit higher since we stopped lisinopril/hydrochlorothiazide, with most BPs now >100mmHg.   Still having falls, probably 2 in the last few months. No injury.    No c/o edema, orthopnea, PND.    Denies palpitations, dizziness, lightheadedness.     VITALS:  Vitals: /73   Pulse 78   Ht 1.829 m (6')   Wt 80 kg (176 lb 6.4 oz)   SpO2 97%   BMI 23.92 kg/m      Diagnostic Testing:  Device interrogation today 32% AP and 100% .  Battery <0.5 months  Echo 2/2024 ([place]) LVEF 55-60%. No RWMA. Nl RV. Aortic sclerosis. Nl aorta.  Echo 1/2022 LVEF 50-55%. Grade I DD. No RWMA. Nl RV.Nl valves and nl RVSP  EKG 9/2021 showed ASVP  Echocardiogam 9/2019 showed EF 50-55%. Mild LVH. Nl RV. 1+ TR. RVSP wnl 21mmHg. Borderline dilated root and ascending aorta      Plan:  PPM generator replacement as planned 9/9/2024  Follow-up arranged with Device   6 m follow-up     Assessment/Plan:    Advanced Heart Block/Syncope  S/p PPM as above in 2011.  Reached SUJATA 6/15/2024 and now set up for generator replacement    PLAN:  [employer] generator replacement scheduled 9/9/2024. We discussed the risks, benefits and indications of generator replacement, including but not limited to use of  conscious sedation including need for a , discomfort, device malfunction and/or recall, and infection requiring explantation of the device and long term antibiotics.  No revision of leads as expected, with decreased risk of peripheral vessel injury, pneumothorax, cardiac puncture and/or tamponade.  We also briefly discussed follow up expectations and restrictions following the procedure. The patient voiced understanding and is willing to proceed.  A consent form will be signed by the procedural physician.  Arrive 1200 noon on 9/9  No solid food after 4 AM, then OK for clear liquids only until 10:00 AM. Nothing to eat/drink from 10 AM until after procedure.  Will take all medications morning of procedure, holding all supplements/vitamins  Shower the morning of the procedure, and then put on a clean shirt in order to help prevent infection.   Home same day with  and no driving for at least 24 hours given sedation    Device clinic will arrange follow-up      HTN, orthostasis  Amlodipine reduced 10/2022, stopped all together 11/2022.  Lisinopril/HCTZ discontinued at last visit 4/2024 and BPs have been higher    PLAN:  Continue fall precautions    Omuou Casarez PA-C, MSPAS      Orders Placed This Encounter   Procedures    Follow-Up with Cardiology BERTA     No orders of the defined types were placed in this encounter.    Medications Discontinued During This Encounter   Medication Reason    alfuzosin ER (UROXATRAL) 10 MG 24 hr tablet Duplicate Therapy (No AVS / No eCancel)    mirabegron (MYRBETRIQ) 25 MG 24 hr tablet Duplicate Therapy (No AVS / No eCancel)           Encounter Diagnoses   Name Primary?    Complete AV block (H)     Pacemaker at end of battery life          CURRENT MEDICATIONS:  Current Outpatient Medications   Medication Sig Dispense Refill    alfuzosin ER (UROXATRAL) 10 MG 24 hr tablet Take 1 tablet (10 mg) by mouth daily 90 tablet 4    aspirin 81 MG tablet Take 81 mg by mouth daily       carbidopa-levodopa (SINEMET CR)  MG CR tablet Take 1 tablet by mouth At Bedtime      carbidopa-levodopa (SINEMET)  MG tablet TAKE 1&1/2 TABLETS BY MOUTH 4 TIMES DAILY      metoprolol succinate ER (TOPROL XL) 50 MG 24 hr tablet Take 1 tablet (50 mg) by mouth daily 90 tablet 3    mirabegron (MYRBETRIQ) 25 MG 24 hr tablet Take 1 tablet (25 mg) by mouth daily 90 tablet 4    Multiple Vitamins-Minerals (CENTRUM SILVER ADULT 50+ PO)          ALLERGIES     Allergies   Allergen Reactions    Penicillins      hives    Statin Drugs [Statins]     Penicillamine Rash         Review of Systems:  Skin:  Negative     Eyes:  Positive for contacts  ENT:  Negative    Respiratory:  Negative for shortness of breath;dyspnea on exertion;cough  Cardiovascular:  Negative for;palpitations;chest pain;dizziness;fatigue;edema lightheadedness;Positive for  Gastroenterology: Negative for melena;hematochezia  Genitourinary:  Negative    Musculoskeletal:  Negative    Neurologic:  Positive for involuntary movement;tremors  Psychiatric:  Negative    Heme/Lymph/Imm:  Negative    Endocrine:  Negative      Physical Exam:  Vitals: /73   Pulse 78   Ht 1.829 m (6')   Wt 80 kg (176 lb 6.4 oz)   SpO2 97%   BMI 23.92 kg/m      Constitutional:  cooperative, alert and oriented, well developed, well nourished, in no acute distress        Skin:  warm and dry to the touch, no apparent skin lesions or masses noted        Head:  normocephalic, no masses or lesions        Eyes:  pupils equal and round;conjunctivae and lids unremarkable;sclera white;no xanthalasma        ENT:  no pallor or cyanosis, dentition good        Neck:  JVP normal;no carotid bruit        Chest:  normal breath sounds, clear to auscultation, normal A-P diameter, normal symmetry, normal respiratory excursion, no use of accessory muscles        Cardiac: regular rhythm, normal S1/S2, no S3 or S4, apical impulse not displaced, no murmurs, gallops or rubs                   Abdomen:  abdomen soft        Vascular: pulses full and equal                                      Extremities and Back:  no deformities, clubbing, cyanosis, erythema observed        Neurological:    tremor          PAST MEDICAL HISTORY:  Past Medical History:   Diagnosis Date    Complete AV block (H)     [employer] dual chamber 4/1/2011    Hyperlipidemia     Hypertension     Mumps     Parkinson disease (H)     Parkinsons disease (H)     Syncope        PAST SURGICAL HISTORY:  Past Surgical History:   Procedure Laterality Date    IMPLANT PACEMAKER         FAMILY HISTORY:  Family History   Problem Relation Age of Onset    C.A.D. Mother     C.A.D. Father        SOCIAL HISTORY:  Social History     Socioeconomic History    Marital status:      Spouse name: None    Number of children: None    Years of education: None    Highest education level: None   Tobacco Use    Smoking status: Never    Smokeless tobacco: Never   Substance and Sexual Activity    Alcohol use: Yes     Comment: glass of wine occ    Drug use: Not Currently   Other Topics Concern    Special Diet No    Exercise Yes     Comment: walking almost everyday     Social Determinants of Health     Financial Resource Strain: Low Risk  (10/16/2023)    Financial Resource Strain     Within the past 12 months, have you or your family members you live with been unable to get utilities (heat, electricity) when it was really needed?: No   Food Insecurity: Low Risk  (10/16/2023)    Food Insecurity     Within the past 12 months, did you worry that your food would run out before you got money to buy more?: No     Within the past 12 months, did the food you bought just not last and you didn t have money to get more?: No   Transportation Needs: Low Risk  (10/16/2023)    Transportation Needs     Within the past 12 months, has lack of transportation kept you from medical appointments, getting your medicines, non-medical meetings or appointments, work, or from  getting things that you need?: No   Housing Stability: Low Risk  (10/16/2023)    Housing Stability     Do you have housing? : Yes     Are you worried about losing your housing?: No

## 2024-09-09 ENCOUNTER — HOSPITAL ENCOUNTER (OUTPATIENT)
Facility: CLINIC | Age: 74
Discharge: HOME | End: 2024-09-09
Attending: INTERNAL MEDICINE | Admitting: INTERNAL MEDICINE
Payer: COMMERCIAL

## 2024-09-09 VITALS
TEMPERATURE: 98.1 F | HEART RATE: 60 BPM | DIASTOLIC BLOOD PRESSURE: 56 MMHG | OXYGEN SATURATION: 98 % | RESPIRATION RATE: 16 BRPM | SYSTOLIC BLOOD PRESSURE: 99 MMHG

## 2024-09-09 DIAGNOSIS — Z45.010 PACEMAKER AT END OF BATTERY LIFE: Primary | ICD-10-CM

## 2024-09-09 LAB
ANION GAP SERPL CALCULATED.3IONS-SCNC: 8 MMOL/L (ref 7–15)
ATRIAL RATE - MUSE: 67 BPM
BUN SERPL-MCNC: 23.4 MG/DL (ref 8–23)
CALCIUM SERPL-MCNC: 9.1 MG/DL (ref 8.8–10.4)
CHLORIDE SERPL-SCNC: 107 MMOL/L (ref 98–107)
CREAT SERPL-MCNC: 0.84 MG/DL (ref 0.67–1.17)
DIASTOLIC BLOOD PRESSURE - MUSE: NORMAL MMHG
EGFRCR SERPLBLD CKD-EPI 2021: >90 ML/MIN/1.73M2
ERYTHROCYTE [DISTWIDTH] IN BLOOD BY AUTOMATED COUNT: 13 % (ref 10–15)
GLUCOSE SERPL-MCNC: 99 MG/DL (ref 70–99)
HCO3 SERPL-SCNC: 25 MMOL/L (ref 22–29)
HCT VFR BLD AUTO: 45.2 % (ref 40–53)
HGB BLD-MCNC: 15 G/DL (ref 13.3–17.7)
INTERPRETATION ECG - MUSE: NORMAL
MCH RBC QN AUTO: 29.9 PG (ref 26.5–33)
MCHC RBC AUTO-ENTMCNC: 33.2 G/DL (ref 31.5–36.5)
MCV RBC AUTO: 90 FL (ref 78–100)
P AXIS - MUSE: 57 DEGREES
PLATELET # BLD AUTO: 107 10E3/UL (ref 150–450)
POTASSIUM SERPL-SCNC: 4.4 MMOL/L (ref 3.4–5.3)
PR INTERVAL - MUSE: 290 MS
QRS DURATION - MUSE: 144 MS
QT - MUSE: 444 MS
QTC - MUSE: 469 MS
R AXIS - MUSE: 32 DEGREES
RBC # BLD AUTO: 5.02 10E6/UL (ref 4.4–5.9)
SODIUM SERPL-SCNC: 140 MMOL/L (ref 135–145)
SYSTOLIC BLOOD PRESSURE - MUSE: NORMAL MMHG
T AXIS - MUSE: 81 DEGREES
VENTRICULAR RATE- MUSE: 67 BPM
WBC # BLD AUTO: 6.4 10E3/UL (ref 4–11)

## 2024-09-09 PROCEDURE — 258N000002 HC RX IP 258 OP 250: Performed by: INTERNAL MEDICINE

## 2024-09-09 PROCEDURE — 250N000009 HC RX 250: Performed by: INTERNAL MEDICINE

## 2024-09-09 PROCEDURE — 93010 ELECTROCARDIOGRAM REPORT: CPT | Performed by: INTERNAL MEDICINE

## 2024-09-09 PROCEDURE — 99152 MOD SED SAME PHYS/QHP 5/>YRS: CPT | Performed by: INTERNAL MEDICINE

## 2024-09-09 PROCEDURE — 33228 REMV&REPLC PM GEN DUAL LEAD: CPT | Performed by: INTERNAL MEDICINE

## 2024-09-09 PROCEDURE — 80048 BASIC METABOLIC PNL TOTAL CA: CPT | Performed by: INTERNAL MEDICINE

## 2024-09-09 PROCEDURE — 99153 MOD SED SAME PHYS/QHP EA: CPT | Performed by: INTERNAL MEDICINE

## 2024-09-09 PROCEDURE — 93005 ELECTROCARDIOGRAM TRACING: CPT

## 2024-09-09 PROCEDURE — 250N000011 HC RX IP 250 OP 636: Performed by: INTERNAL MEDICINE

## 2024-09-09 PROCEDURE — 999N000054 HC STATISTIC EKG NON-CHARGEABLE

## 2024-09-09 PROCEDURE — 36415 COLL VENOUS BLD VENIPUNCTURE: CPT | Performed by: INTERNAL MEDICINE

## 2024-09-09 PROCEDURE — 999N000184 HC STATISTIC TELEMETRY

## 2024-09-09 PROCEDURE — 85027 COMPLETE CBC AUTOMATED: CPT | Performed by: INTERNAL MEDICINE

## 2024-09-09 PROCEDURE — 272N000001 HC OR GENERAL SUPPLY STERILE: Performed by: INTERNAL MEDICINE

## 2024-09-09 PROCEDURE — 999N000071 HC STATISTIC HEART CATH LAB OR EP LAB

## 2024-09-09 PROCEDURE — C1785 PMKR, DUAL, RATE-RESP: HCPCS | Performed by: INTERNAL MEDICINE

## 2024-09-09 DEVICE — PCMKR CARD ACCOLADE MRI EL DR: Type: IMPLANTABLE DEVICE | Status: FUNCTIONAL

## 2024-09-09 RX ORDER — NALOXONE HYDROCHLORIDE 0.4 MG/ML
0.4 INJECTION, SOLUTION INTRAMUSCULAR; INTRAVENOUS; SUBCUTANEOUS
Status: DISCONTINUED | OUTPATIENT
Start: 2024-09-09 | End: 2024-09-09 | Stop reason: HOSPADM

## 2024-09-09 RX ORDER — LIDOCAINE 40 MG/G
CREAM TOPICAL
Status: DISCONTINUED | OUTPATIENT
Start: 2024-09-09 | End: 2024-09-09 | Stop reason: HOSPADM

## 2024-09-09 RX ORDER — MIDAZOLAM HCL IN 0.9 % NACL/PF 1 MG/ML
.5-6 PLASTIC BAG, INJECTION (ML) INTRAVENOUS CONTINUOUS PRN
Status: DISCONTINUED | OUTPATIENT
Start: 2024-09-09 | End: 2024-09-09 | Stop reason: HOSPADM

## 2024-09-09 RX ORDER — NALOXONE HYDROCHLORIDE 0.4 MG/ML
0.2 INJECTION, SOLUTION INTRAMUSCULAR; INTRAVENOUS; SUBCUTANEOUS
Status: DISCONTINUED | OUTPATIENT
Start: 2024-09-09 | End: 2024-09-09 | Stop reason: HOSPADM

## 2024-09-09 RX ORDER — BUPIVACAINE HYDROCHLORIDE 2.5 MG/ML
INJECTION, SOLUTION EPIDURAL; INFILTRATION; INTRACAUDAL
Status: DISCONTINUED | OUTPATIENT
Start: 2024-09-09 | End: 2024-09-09 | Stop reason: HOSPADM

## 2024-09-09 RX ORDER — SODIUM CHLORIDE 450 MG/100ML
INJECTION, SOLUTION INTRAVENOUS CONTINUOUS
Status: DISCONTINUED | OUTPATIENT
Start: 2024-09-09 | End: 2024-09-09 | Stop reason: HOSPADM

## 2024-09-09 RX ORDER — FENTANYL CITRATE 50 UG/ML
INJECTION, SOLUTION INTRAMUSCULAR; INTRAVENOUS
Status: DISCONTINUED | OUTPATIENT
Start: 2024-09-09 | End: 2024-09-09 | Stop reason: HOSPADM

## 2024-09-09 RX ORDER — CLINDAMYCIN PHOSPHATE 900 MG/50ML
900 INJECTION, SOLUTION INTRAVENOUS
Status: COMPLETED | OUTPATIENT
Start: 2024-09-09 | End: 2024-09-09

## 2024-09-09 RX ORDER — ACETAMINOPHEN 325 MG/1
650 TABLET ORAL EVERY 4 HOURS PRN
Status: DISCONTINUED | OUTPATIENT
Start: 2024-09-09 | End: 2024-09-09 | Stop reason: HOSPADM

## 2024-09-09 RX ADMIN — SODIUM CHLORIDE: 4.5 INJECTION, SOLUTION INTRAVENOUS at 12:47

## 2024-09-09 RX ADMIN — CLINDAMYCIN PHOSPHATE 900 MG: 900 INJECTION, SOLUTION INTRAVENOUS at 13:30

## 2024-09-09 ASSESSMENT — ACTIVITIES OF DAILY LIVING (ADL)
ADLS_ACUITY_SCORE: 33
ADLS_ACUITY_SCORE: 35

## 2024-09-09 NOTE — DISCHARGE INSTRUCTIONS
Pacemaker Generator Change Discharge Instructions    After you go home:    Have an adult stay with you until tomorrow.  You may resume your normal diet.       For 24 hours - due to the sedation you received:  Relax and take it easy.  Do NOT make any important or legal decisions.  Do NOT drive or operate machines at home or at work.  Do NOT drink alcohol.    Care of Chest Incision:    Keep the dressing on at least 1 week. The dressing will be removed by the device nurse at your return appointment.   If there is a pressure dressing (gauze & tape) - 24 hours after your procedure you may remove ONLY the top dressing. Leave the bottom dressing on.  Leave the strips of tape on. They will fall off on their own.  Check your wound daily for signs of infection, such as increased redness, severe swelling or draining. Fever may also be a sign of infection. Call us if you see any of these signs.  You may shower with the Aquacel dressing in place as it is waterproof.  Do not scrub on top of the dressing & avoid peeling the dressing off. If you take a tub bath, keep the dressing dry.  No soaking the incision (swimming pool, bathtub, hot tub) for 2 weeks.  You may have mild to medium pain for 3 to 5 days. Take Acetaminophen (Tylenol) or Ibuprofen (Advil) for the pain. If the pain persists or is severe, call us.    Activity:    You can begin to use your arm as it feels comfortable to you.  No driving for one day & limit to necessary driving for one week.    Bleeding:    If you start bleeding from the incision site, sit down and press firmly on the site for 10 minutes.   Once bleeding stops, call [facility] as soon as you can.       Call 911 right away if you have heavy bleeding or bleeding that does not stop.      Medicines:    Take your medications, including blood thinners, unless your provider tells you not to.  If you have stopped any other medicines, check with your provider about when to restart them.    Follow Up  Appointments:    Follow up with Device Clinic at [facility] of patient preference in 7-10 days.    Call the clinic if:    You have a large or growing hard lump around the site.  The site is red, swollen, hot or tender.  Blood or fluid is draining from the site.  You have chills or a fever greater than 101 F (38 C).  You feel dizzy or light-headed.  Questions or concerns.      Telling others about your device:    Before you leave the hospital, you will receive a temporary ID card. A permanent card will be mailed to you about 6 to 8 weeks later. Always carry the ID card with you. It has important details about your device.  You may also get a Medical Alert bracelet or tag that says you have a pacemaker or a defibrillator (ICD).  Go to www.[email or web address].   Always tell doctors, dentists and other care providers that you have a device implanted in you.  Let us know before you plan any surgeries. Your care team must take special steps to keep you safe during certain procedures. These steps will depend on the type of device you have. Your provider will need to see your ID card. They may need to call us for instructions.    Device Safety:    Please refer to device  s booklet for further information.        [facility] [place]    [phone]  Device Clinic    Or you may contact your provider via My Chart

## 2024-09-09 NOTE — PROGRESS NOTES
Care Suites Admission Nursing Note    Patient Information  Name: Steven Dye  Age: 73 year old  Reason for admission: ppm generator change  Care Suites arrival time: 1200    Visitor Information  Name: ayesha spouse     Patient Admission/Assessment   Pre-procedure assessment complete: Yes  If abnormal assessment/labs, provider notified: N/A - awaiting lab results  NPO: Yes  Medications held per instructions/orders: N/A  Consent: deferred to MD  Patient oriented to room: Yes  Education/questions answered: Yes  Plan/other: proceed as planned per orders    Discharge Planning  Discharge name/phone number: Ayesha wife [phone]  Overnight post sedation caregiver: Ayesha  Discharge location: home    Tori Juan RN

## 2024-09-09 NOTE — Clinical Note
Hemodynamic equipment used: 5 lead ECG, ObsEvaK With 3 Leads, Machine BP Cuff and pulse oximeter probe.

## 2024-09-09 NOTE — PROGRESS NOTES
Care Suites Discharge Nursing Note    Patient Information  Name: Steven Dye  Age: 73 year old    Discharge Education:  Discharge instructions reviewed: Yes  Additional education/resources provided: [employer] rep completed education with patient.   Patient/patient representative verbalizes understanding: Yes  Patient discharging on new medications: No  Medication education completed: N/A    Discharge Plans:   Discharge location: home  Discharge ride contacted: Yes  Approximate discharge time: 1540    Discharge Criteria:  Discharge criteria met and vital signs stable: Yes    Site is CDI. No swelling, bleeding or hematoma present. Patient denies pain.     Patient Belongs:  Patient belongings returned to patient: Yes    Brooklyn Stewart RN

## 2024-09-09 NOTE — PRE-PROCEDURE
GENERAL PRE-PROCEDURE:   Procedure:  Pacemaker replacement  Date/Time:  9/9/2024 1:43 PM    Written consent obtained?: Yes    Risks and benefits: Risks, benefits and alternatives were discussed    Consent given by:  Patient  Patient states understanding of procedure being performed: Yes    Patient's understanding of procedure matches consent: Yes    Procedure consent matches procedure scheduled: Yes    Expected level of sedation:  Moderate  Appropriately NPO:  Yes  ASA Class:  2  Mallampati  :  Grade 2- soft palate, base of uvula, tonsillar pillars, and portion of posterior pharyngeal wall visible  Lungs:  Lungs clear with good breath sounds bilaterally  Heart:  Normal heart sounds and rate and other (comment)  Heart exam comment:  Paced  History & Physical reviewed:  History and physical reviewed and no updates needed  Statement of review:  I have reviewed the lab findings, diagnostic data, medications, and the plan for sedation

## 2024-09-09 NOTE — PROGRESS NOTES
3806 - Page sent to Dr. Kim.     Destin Marie. patient is back in CS11 post-procedure. BP 83/55. Patient states he is not lightheaded or dizzy. He states he feels like he did pre-procedure.   [phone]      Per Dr. Kim, patient is able to discharge if patient feels stable due to patient's history of having low Bps. Dr. Kim okay with patient discharging.

## 2024-09-10 ENCOUNTER — TELEPHONE (OUTPATIENT)
Dept: CARDIOLOGY | Facility: CLINIC | Age: 74
End: 2024-09-10
Payer: COMMERCIAL

## 2024-09-10 NOTE — TELEPHONE ENCOUNTER
Post device implant discharge phone call.  Chris PPM gen change  9/9/2024  [place]    Reviewed the following:    - For strenuous sports such as tennis, pickle ball, basketball, golf, or weight lifting wait 4 weeks to ensure stable lead healing.   - If there is a pressure dressing in place, this can be removed after 24 hours.   - Leave Aquacel dressing in place.  Okay to shower the day after the procedure.  If this begins to peel up, contact the device clinic.    - Aquacel dressing and steri-strips will be removed at 1 week device check, as appropriate  - Watch for redness, drainage, warmth, or fever. Call device clinic if any signs of infection.   - No soaking in bath tub, swimming pool or hot tub until you are cleared at your 6-8 week check    1 week device check scheduled: 9/20/2024 @ 11:00    Pt states understanding of all instructions and will call with any questions.  Device clinic phone number provided ([phone]).   Nicole SOLARES

## 2024-09-17 ENCOUNTER — ANCILLARY PROCEDURE (OUTPATIENT)
Dept: CARDIOLOGY | Facility: CLINIC | Age: 74
End: 2024-09-17
Attending: INTERNAL MEDICINE
Payer: COMMERCIAL

## 2024-09-17 DIAGNOSIS — I44.2 ATRIOVENTRICULAR BLOCK, COMPLETE (H): ICD-10-CM

## 2024-09-17 DIAGNOSIS — Z95.0 CARDIAC PACEMAKER IN SITU: ICD-10-CM

## 2024-09-17 PROCEDURE — 93280 PM DEVICE PROGR EVAL DUAL: CPT | Performed by: INTERNAL MEDICINE

## 2024-09-18 LAB
MDC_IDC_LEAD_CONNECTION_STATUS: NORMAL
MDC_IDC_LEAD_CONNECTION_STATUS: NORMAL
MDC_IDC_LEAD_IMPLANT_DT: NORMAL
MDC_IDC_LEAD_IMPLANT_DT: NORMAL
MDC_IDC_LEAD_LOCATION: NORMAL
MDC_IDC_LEAD_LOCATION: NORMAL
MDC_IDC_LEAD_LOCATION_DETAIL_1: NORMAL
MDC_IDC_LEAD_LOCATION_DETAIL_1: NORMAL
MDC_IDC_LEAD_MFG: NORMAL
MDC_IDC_LEAD_MFG: NORMAL
MDC_IDC_LEAD_MODEL: NORMAL
MDC_IDC_LEAD_MODEL: NORMAL
MDC_IDC_LEAD_POLARITY_TYPE: NORMAL
MDC_IDC_LEAD_POLARITY_TYPE: NORMAL
MDC_IDC_LEAD_SERIAL: NORMAL
MDC_IDC_LEAD_SERIAL: NORMAL
MDC_IDC_MSMT_BATTERY_DTM: NORMAL
MDC_IDC_MSMT_BATTERY_REMAINING_LONGEVITY: 174 MO
MDC_IDC_MSMT_BATTERY_REMAINING_PERCENTAGE: 100 %
MDC_IDC_MSMT_BATTERY_STATUS: NORMAL
MDC_IDC_MSMT_LEADCHNL_RA_IMPEDANCE_VALUE: 807 OHM
MDC_IDC_MSMT_LEADCHNL_RA_PACING_THRESHOLD_AMPLITUDE: 0.4 V
MDC_IDC_MSMT_LEADCHNL_RA_PACING_THRESHOLD_PULSEWIDTH: 0.4 MS
MDC_IDC_MSMT_LEADCHNL_RV_IMPEDANCE_VALUE: 698 OHM
MDC_IDC_MSMT_LEADCHNL_RV_PACING_THRESHOLD_AMPLITUDE: 0.9 V
MDC_IDC_MSMT_LEADCHNL_RV_PACING_THRESHOLD_PULSEWIDTH: 0.4 MS
MDC_IDC_PG_IMPLANT_DTM: NORMAL
MDC_IDC_PG_MFG: NORMAL
MDC_IDC_PG_MODEL: NORMAL
MDC_IDC_PG_SERIAL: NORMAL
MDC_IDC_PG_TYPE: NORMAL
MDC_IDC_SESS_CLINIC_NAME: NORMAL
MDC_IDC_SESS_DTM: NORMAL
MDC_IDC_SESS_TYPE: NORMAL
MDC_IDC_SET_BRADY_AT_MODE_SWITCH_MODE: NORMAL
MDC_IDC_SET_BRADY_AT_MODE_SWITCH_RATE: 170 {BEATS}/MIN
MDC_IDC_SET_BRADY_LOWRATE: 60 {BEATS}/MIN
MDC_IDC_SET_BRADY_MAX_SENSOR_RATE: 130 {BEATS}/MIN
MDC_IDC_SET_BRADY_MAX_TRACKING_RATE: 130 {BEATS}/MIN
MDC_IDC_SET_BRADY_MODE: NORMAL
MDC_IDC_SET_BRADY_PAV_DELAY_HIGH: 180 MS
MDC_IDC_SET_BRADY_PAV_DELAY_LOW: 250 MS
MDC_IDC_SET_BRADY_SAV_DELAY_HIGH: 180 MS
MDC_IDC_SET_BRADY_SAV_DELAY_LOW: 250 MS
MDC_IDC_SET_LEADCHNL_RA_PACING_AMPLITUDE: 2 V
MDC_IDC_SET_LEADCHNL_RA_PACING_CAPTURE_MODE: NORMAL
MDC_IDC_SET_LEADCHNL_RA_PACING_POLARITY: NORMAL
MDC_IDC_SET_LEADCHNL_RA_PACING_PULSEWIDTH: 0.4 MS
MDC_IDC_SET_LEADCHNL_RA_SENSING_ADAPTATION_MODE: NORMAL
MDC_IDC_SET_LEADCHNL_RA_SENSING_POLARITY: NORMAL
MDC_IDC_SET_LEADCHNL_RA_SENSING_SENSITIVITY: 0.5 MV
MDC_IDC_SET_LEADCHNL_RV_PACING_AMPLITUDE: 2 V
MDC_IDC_SET_LEADCHNL_RV_PACING_CAPTURE_MODE: NORMAL
MDC_IDC_SET_LEADCHNL_RV_PACING_POLARITY: NORMAL
MDC_IDC_SET_LEADCHNL_RV_PACING_PULSEWIDTH: 0.4 MS
MDC_IDC_SET_LEADCHNL_RV_SENSING_ADAPTATION_MODE: NORMAL
MDC_IDC_SET_LEADCHNL_RV_SENSING_POLARITY: NORMAL
MDC_IDC_SET_LEADCHNL_RV_SENSING_SENSITIVITY: 2.5 MV
MDC_IDC_SET_ZONE_DETECTION_INTERVAL: 375 MS
MDC_IDC_SET_ZONE_STATUS: NORMAL
MDC_IDC_SET_ZONE_TYPE: NORMAL
MDC_IDC_SET_ZONE_VENDOR_TYPE: NORMAL
MDC_IDC_STAT_AT_BURDEN_PERCENT: 0 %
MDC_IDC_STAT_AT_DTM_END: NORMAL
MDC_IDC_STAT_AT_DTM_START: NORMAL
MDC_IDC_STAT_BRADY_DTM_END: NORMAL
MDC_IDC_STAT_BRADY_DTM_START: NORMAL
MDC_IDC_STAT_BRADY_RA_PERCENT_PACED: 22 %
MDC_IDC_STAT_BRADY_RV_PERCENT_PACED: 98 %
MDC_IDC_STAT_EPISODE_RECENT_COUNT: 0
MDC_IDC_STAT_EPISODE_RECENT_COUNT_DTM_END: NORMAL
MDC_IDC_STAT_EPISODE_RECENT_COUNT_DTM_START: NORMAL
MDC_IDC_STAT_EPISODE_TYPE: NORMAL
MDC_IDC_STAT_EPISODE_VENDOR_TYPE: NORMAL
MDC_IDC_STAT_EPISODE_VENDOR_TYPE: NORMAL

## 2024-11-27 ENCOUNTER — PATIENT OUTREACH (OUTPATIENT)
Dept: CARE COORDINATION | Facility: CLINIC | Age: 74
End: 2024-11-27
Payer: COMMERCIAL

## 2024-12-19 ENCOUNTER — ANCILLARY PROCEDURE (OUTPATIENT)
Dept: CARDIOLOGY | Facility: CLINIC | Age: 74
End: 2024-12-19
Attending: INTERNAL MEDICINE
Payer: COMMERCIAL

## 2024-12-19 DIAGNOSIS — I44.2 COMPLETE AV BLOCK (H): ICD-10-CM

## 2024-12-19 PROCEDURE — 93296 REM INTERROG EVL PM/IDS: CPT | Performed by: INTERNAL MEDICINE

## 2024-12-19 PROCEDURE — 93294 REM INTERROG EVL PM/LDLS PM: CPT | Performed by: INTERNAL MEDICINE

## 2024-12-20 LAB
MDC_IDC_EPISODE_DTM: NORMAL
MDC_IDC_EPISODE_DTM: NORMAL
MDC_IDC_EPISODE_ID: NORMAL
MDC_IDC_EPISODE_ID: NORMAL
MDC_IDC_EPISODE_TYPE: NORMAL
MDC_IDC_EPISODE_TYPE: NORMAL
MDC_IDC_LEAD_CONNECTION_STATUS: NORMAL
MDC_IDC_LEAD_CONNECTION_STATUS: NORMAL
MDC_IDC_LEAD_IMPLANT_DT: NORMAL
MDC_IDC_LEAD_IMPLANT_DT: NORMAL
MDC_IDC_LEAD_LOCATION: NORMAL
MDC_IDC_LEAD_LOCATION: NORMAL
MDC_IDC_LEAD_LOCATION_DETAIL_1: NORMAL
MDC_IDC_LEAD_LOCATION_DETAIL_1: NORMAL
MDC_IDC_LEAD_MFG: NORMAL
MDC_IDC_LEAD_MFG: NORMAL
MDC_IDC_LEAD_MODEL: NORMAL
MDC_IDC_LEAD_MODEL: NORMAL
MDC_IDC_LEAD_POLARITY_TYPE: NORMAL
MDC_IDC_LEAD_POLARITY_TYPE: NORMAL
MDC_IDC_LEAD_SERIAL: NORMAL
MDC_IDC_LEAD_SERIAL: NORMAL
MDC_IDC_MSMT_BATTERY_DTM: NORMAL
MDC_IDC_MSMT_BATTERY_REMAINING_LONGEVITY: 174 MO
MDC_IDC_MSMT_BATTERY_REMAINING_PERCENTAGE: 100 %
MDC_IDC_MSMT_BATTERY_STATUS: NORMAL
MDC_IDC_MSMT_LEADCHNL_RA_IMPEDANCE_VALUE: 786 OHM
MDC_IDC_MSMT_LEADCHNL_RA_PACING_THRESHOLD_AMPLITUDE: 0.4 V
MDC_IDC_MSMT_LEADCHNL_RA_PACING_THRESHOLD_PULSEWIDTH: 0.4 MS
MDC_IDC_MSMT_LEADCHNL_RV_IMPEDANCE_VALUE: 731 OHM
MDC_IDC_MSMT_LEADCHNL_RV_PACING_THRESHOLD_AMPLITUDE: 0.8 V
MDC_IDC_MSMT_LEADCHNL_RV_PACING_THRESHOLD_PULSEWIDTH: 0.4 MS
MDC_IDC_PG_IMPLANT_DTM: NORMAL
MDC_IDC_PG_MFG: NORMAL
MDC_IDC_PG_MODEL: NORMAL
MDC_IDC_PG_SERIAL: NORMAL
MDC_IDC_PG_TYPE: NORMAL
MDC_IDC_SESS_CLINIC_NAME: NORMAL
MDC_IDC_SESS_DTM: NORMAL
MDC_IDC_SESS_TYPE: NORMAL
MDC_IDC_SET_BRADY_AT_MODE_SWITCH_MODE: NORMAL
MDC_IDC_SET_BRADY_AT_MODE_SWITCH_RATE: 170 {BEATS}/MIN
MDC_IDC_SET_BRADY_LOWRATE: 60 {BEATS}/MIN
MDC_IDC_SET_BRADY_MAX_SENSOR_RATE: 130 {BEATS}/MIN
MDC_IDC_SET_BRADY_MAX_TRACKING_RATE: 130 {BEATS}/MIN
MDC_IDC_SET_BRADY_MODE: NORMAL
MDC_IDC_SET_BRADY_PAV_DELAY_HIGH: 180 MS
MDC_IDC_SET_BRADY_PAV_DELAY_LOW: 250 MS
MDC_IDC_SET_BRADY_SAV_DELAY_HIGH: 180 MS
MDC_IDC_SET_BRADY_SAV_DELAY_LOW: 250 MS
MDC_IDC_SET_LEADCHNL_RA_PACING_AMPLITUDE: 2 V
MDC_IDC_SET_LEADCHNL_RA_PACING_CAPTURE_MODE: NORMAL
MDC_IDC_SET_LEADCHNL_RA_PACING_POLARITY: NORMAL
MDC_IDC_SET_LEADCHNL_RA_PACING_PULSEWIDTH: 0.4 MS
MDC_IDC_SET_LEADCHNL_RA_SENSING_ADAPTATION_MODE: NORMAL
MDC_IDC_SET_LEADCHNL_RA_SENSING_POLARITY: NORMAL
MDC_IDC_SET_LEADCHNL_RA_SENSING_SENSITIVITY: 0.5 MV
MDC_IDC_SET_LEADCHNL_RV_PACING_AMPLITUDE: 2 V
MDC_IDC_SET_LEADCHNL_RV_PACING_CAPTURE_MODE: NORMAL
MDC_IDC_SET_LEADCHNL_RV_PACING_POLARITY: NORMAL
MDC_IDC_SET_LEADCHNL_RV_PACING_PULSEWIDTH: 0.4 MS
MDC_IDC_SET_LEADCHNL_RV_SENSING_ADAPTATION_MODE: NORMAL
MDC_IDC_SET_LEADCHNL_RV_SENSING_POLARITY: NORMAL
MDC_IDC_SET_LEADCHNL_RV_SENSING_SENSITIVITY: 2.5 MV
MDC_IDC_SET_ZONE_DETECTION_INTERVAL: 375 MS
MDC_IDC_SET_ZONE_STATUS: NORMAL
MDC_IDC_SET_ZONE_TYPE: NORMAL
MDC_IDC_SET_ZONE_VENDOR_TYPE: NORMAL
MDC_IDC_STAT_AT_BURDEN_PERCENT: 0 %
MDC_IDC_STAT_AT_DTM_END: NORMAL
MDC_IDC_STAT_AT_DTM_START: NORMAL
MDC_IDC_STAT_BRADY_DTM_END: NORMAL
MDC_IDC_STAT_BRADY_DTM_START: NORMAL
MDC_IDC_STAT_BRADY_RA_PERCENT_PACED: 22 %
MDC_IDC_STAT_BRADY_RV_PERCENT_PACED: 98 %
MDC_IDC_STAT_EPISODE_RECENT_COUNT: 0
MDC_IDC_STAT_EPISODE_RECENT_COUNT: 0
MDC_IDC_STAT_EPISODE_RECENT_COUNT: 1
MDC_IDC_STAT_EPISODE_RECENT_COUNT_DTM_END: NORMAL
MDC_IDC_STAT_EPISODE_RECENT_COUNT_DTM_START: NORMAL
MDC_IDC_STAT_EPISODE_TYPE: NORMAL
MDC_IDC_STAT_EPISODE_VENDOR_TYPE: NORMAL
MDC_IDC_STAT_EPISODE_VENDOR_TYPE: NORMAL

## 2025-01-28 ENCOUNTER — MYC MEDICAL ADVICE (OUTPATIENT)
Dept: CARDIOLOGY | Facility: CLINIC | Age: 75
End: 2025-01-28
Payer: COMMERCIAL

## 2025-01-28 DIAGNOSIS — N32.81 OVERACTIVE BLADDER: Primary | ICD-10-CM

## 2025-01-28 RX ORDER — TROSPIUM CHLORIDE 20 MG/1
20 TABLET, FILM COATED ORAL
Qty: 60 TABLET | Refills: 0 | Status: SHIPPED | OUTPATIENT
Start: 2025-01-28 | End: 2025-02-27

## 2025-03-17 DIAGNOSIS — N32.81 OVERACTIVE BLADDER: ICD-10-CM

## 2025-03-17 RX ORDER — TROSPIUM CHLORIDE 20 MG/1
20 TABLET, FILM COATED ORAL
Qty: 120 TABLET | Refills: 0 | Status: SHIPPED | OUTPATIENT
Start: 2025-03-17 | End: 2025-03-18

## 2025-03-18 DIAGNOSIS — N32.81 OVERACTIVE BLADDER: ICD-10-CM

## 2025-03-18 RX ORDER — TROSPIUM CHLORIDE 20 MG/1
20 TABLET, FILM COATED ORAL
Qty: 120 TABLET | Refills: 0 | Status: SHIPPED | OUTPATIENT
Start: 2025-03-18

## 2025-05-12 ENCOUNTER — VIRTUAL VISIT (OUTPATIENT)
Dept: UROLOGY | Facility: CLINIC | Age: 75
End: 2025-05-12
Payer: COMMERCIAL

## 2025-05-12 VITALS — BODY MASS INDEX: 25.06 KG/M2 | WEIGHT: 185 LBS | HEIGHT: 72 IN

## 2025-05-12 DIAGNOSIS — R39.9 LOWER URINARY TRACT SYMPTOMS (LUTS): Primary | ICD-10-CM

## 2025-05-12 PROCEDURE — 98005 SYNCH AUDIO-VIDEO EST LOW 20: CPT | Performed by: NURSE PRACTITIONER

## 2025-05-12 PROCEDURE — 1126F AMNT PAIN NOTED NONE PRSNT: CPT | Performed by: NURSE PRACTITIONER

## 2025-05-12 RX ORDER — TROSPIUM CHLORIDE 20 MG/1
20 TABLET, FILM COATED ORAL
Qty: 180 TABLET | Refills: 4 | Status: SHIPPED | OUTPATIENT
Start: 2025-05-12

## 2025-05-12 RX ORDER — ALFUZOSIN HYDROCHLORIDE 10 MG/1
10 TABLET, EXTENDED RELEASE ORAL DAILY
Qty: 90 TABLET | Refills: 4 | Status: SHIPPED | OUTPATIENT
Start: 2025-05-12

## 2025-05-12 ASSESSMENT — PAIN SCALES - GENERAL: PAINLEVEL_OUTOF10: NO PAIN (0)

## 2025-05-12 NOTE — LETTER
5/12/2025       RE: Steven Dye  [address] [id number]     Dear Colleague,    Thank you for referring your patient, Steven Dye, to the [facility] [place] at [facility]. Please see a copy of my visit note below.    Virtual Visit Details  Type of service:  Video Visit   Originating Location (pt. Location): Home  Distant Location (provider location):  Off-site  Platform used for Video Visit: [facility]      Urology Virtual Visit    Name: Steven Dye    MRN: [id number]   YOB: 1950               Chief Complaint:   LUTS         Impression and Plan:   Impression / Plan:   Steven Dye is a 74 year old male with LUTS  attributable to Parkinsons and/or BPH.       -Continue Alfuzosin, Trospium, refilled.  Follow-up 1 yr with UA, PVR, symptom recheck    Thank you for the opportunity to participate in the care of Steven Dye.     JOY Wu, CNP   Phys[facility] - Department of Urology  [phone]          History of Present Illness:   Steven Dye is a pleasant 74 year old male with Hx of Parkinson's, HTN, HLD, complete AV block w pacemaker, who has been managing his  nocturia and urge incontinence with alfuzosin and Trospium, he notes less urge incontinence with this. No gross hematuria.     History is obtained from patient & EMR          Past Medical History:     Past Medical History:   Diagnosis Date     Complete AV block (H)     [employer] dual chamber 4/1/2011     Hyperlipidemia      Hypertension      Mumps      Parkinson disease (H)      Parkinsons disease (H)      Syncope           Past Surgical History:     Past Surgical History:   Procedure Laterality Date     EP PACEMAKER GENERATOR REPLACEMENT- DUAL N/A 9/9/2024    Procedure: Pacemaker Generator Replacement Dual;  Surgeon: Tonja Kim MD;  Location:  HEART CARDIAC CATH LAB     IMPLANT  PACEMAKER            Social History:     Social History     Tobacco Use     Smoking status: Never     Smokeless tobacco: Never   Substance Use Topics     Alcohol use: Yes     Comment: glass of wine occ          Family History:     Family History   Problem Relation Age of Onset     C.A.D. Mother      C.A.D. Father           Allergies:     Allergies   Allergen Reactions     Penicillins      hives     Statin Drugs [Statins]      Penicillamine Rash          Medications:     Current Outpatient Medications   Medication Sig Dispense Refill     aspirin 81 MG tablet Take 81 mg by mouth daily       carbidopa-levodopa (SINEMET CR)  MG CR tablet Take 1 tablet by mouth At Bedtime       carbidopa-levodopa (SINEMET)  MG tablet TAKE 1&1/2 TABLETS BY MOUTH 4 TIMES DAILY       metoprolol succinate ER (TOPROL XL) 50 MG 24 hr tablet Take 1 tablet (50 mg) by mouth daily 90 tablet 3     Multiple Vitamins-Minerals (CENTRUM SILVER ADULT 50+ PO)        trospium (SANCTURA) 20 MG tablet Take 1 tablet (20 mg) by mouth 2 times daily (before meals). 120 tablet 0     alfuzosin ER (UROXATRAL) 10 MG 24 hr tablet Take 1 tablet (10 mg) by mouth daily 90 tablet 4     No current facility-administered medications for this visit.          Review of Systems:    ROS: See HPI for pertinent details.  Remainder of 10-point ROS negative.         Physical Exam:   VS:  T: Data Unavailable    HR: Data Unavailable    BP: Data Unavailable    RR: Data Unavailable     GEN:  Alert.  NAD.    HEENT:  Sclerae anicteric.    CV:  No obvious jugular venous distension  LUNGS: No respiratory distress, breathing comfortably wo accessory muscle use.  SKIN:  No visible rash  NEURO:  CN grossly intact.          Laboratory Data:     Lab Results   Component Value Date    PSA 2.02 12/16/2021    PSA 1.99 06/16/2021     Lab Results   Component Value Date    CR 0.84 09/09/2024    CR 1.00 12/02/2022    CR 0.98 03/16/2022    CR 0.98 12/16/2021    CR 0.92 06/16/2021    CR  1.39 03/15/2018    CR 1.57 10/30/2015    CR 1.32 04/01/2011     Lab Results   Component Value Date    GFRESTIMATED >90 09/09/2024    GFRESTIMATED 79 12/02/2022    GFRESTIMATED 82 03/16/2022    GFRESTIMATED 77 12/16/2021    GFRESTIMATED 83 06/16/2021    GFRESTIMATED 51 03/15/2018    GFRESTIMATED 44 10/30/2015    GFRESTIMATED 55 04/01/2011            Again, thank you for allowing me to participate in the care of your patient.      Sincerely,    JOY Howard CNP

## 2025-05-12 NOTE — NURSING NOTE
Current patient location: [address] [place] [id number]    Is the patient currently in the state of [place]? YES    Visit mode: VIDEO    If the visit is dropped, the patient can be reconnected by:VIDEO VISIT: Send to e-mail at: [email or web address]    Will anyone else be joining the visit? NO  (If patient encounters technical issues they should call [phone]    Are changes needed to the allergy or medication list? Pt stated no changes to allergies and Pt stated no med changes    Are refills needed on medications prescribed by this physician? NO    Rooming Documentation:  Not applicable    Reason for visit: MICHELLE Paez VVF

## 2025-05-12 NOTE — PROGRESS NOTES
Virtual Visit Details  Type of service:  Video Visit   Originating Location (pt. Location): Home  Distant Location (provider location):  Off-site  Platform used for Video Visit: [facility]      Urology Virtual Visit    Name: Steven Dye    MRN: [id number]   YOB: 1950               Chief Complaint:   LUTS         Impression and Plan:   Impression / Plan:   Steven Dye is a 74 year old male with LUTS  attributable to Parkinsons and/or BPH.       -Continue Alfuzosin, Trospium, refilled.  Follow-up 1 yr with UA, PVR, symptom recheck    Thank you for the opportunity to participate in the care of Steven Dye.     JOY Wu, CNP  [facility] - Department of Urology  [phone]          History of Present Illness:   Steven Dye is a pleasant 74 year old male with Hx of Parkinson's, HTN, HLD, complete AV block w pacemaker, who has been managing his  nocturia and urge incontinence with alfuzosin and Trospium, he notes less urge incontinence with this. No gross hematuria.     History is obtained from patient & EMR          Past Medical History:     Past Medical History:   Diagnosis Date    Complete AV block (H)     [employer] dual chamber 4/1/2011    Hyperlipidemia     Hypertension     Mumps     Parkinson disease (H)     Parkinsons disease (H)     Syncope           Past Surgical History:     Past Surgical History:   Procedure Laterality Date    EP PACEMAKER GENERATOR REPLACEMENT- DUAL N/A 9/9/2024    Procedure: Pacemaker Generator Replacement Dual;  Surgeon: Tonja Kim MD;  Location:  HEART CARDIAC CATH LAB    IMPLANT PACEMAKER            Social History:     Social History     Tobacco Use    Smoking status: Never    Smokeless tobacco: Never   Substance Use Topics    Alcohol use: Yes     Comment: glass of wine occ          Family History:     Family History   Problem Relation Age of Onset    C.A.D. Mother     C.A.D. Father            Allergies:     Allergies   Allergen Reactions    Penicillins      hives    Statin Drugs [Statins]     Penicillamine Rash          Medications:     Current Outpatient Medications   Medication Sig Dispense Refill    aspirin 81 MG tablet Take 81 mg by mouth daily      carbidopa-levodopa (SINEMET CR)  MG CR tablet Take 1 tablet by mouth At Bedtime      carbidopa-levodopa (SINEMET)  MG tablet TAKE 1&1/2 TABLETS BY MOUTH 4 TIMES DAILY      metoprolol succinate ER (TOPROL XL) 50 MG 24 hr tablet Take 1 tablet (50 mg) by mouth daily 90 tablet 3    Multiple Vitamins-Minerals (CENTRUM SILVER ADULT 50+ PO)       trospium (SANCTURA) 20 MG tablet Take 1 tablet (20 mg) by mouth 2 times daily (before meals). 120 tablet 0    alfuzosin ER (UROXATRAL) 10 MG 24 hr tablet Take 1 tablet (10 mg) by mouth daily 90 tablet 4     No current facility-administered medications for this visit.          Review of Systems:    ROS: See HPI for pertinent details.  Remainder of 10-point ROS negative.         Physical Exam:   VS:  T: Data Unavailable    HR: Data Unavailable    BP: Data Unavailable    RR: Data Unavailable     GEN:  Alert.  NAD.    HEENT:  Sclerae anicteric.    CV:  No obvious jugular venous distension  LUNGS: No respiratory distress, breathing comfortably wo accessory muscle use.  SKIN:  No visible rash  NEURO:  CN grossly intact.          Laboratory Data:     Lab Results   Component Value Date    PSA 2.02 12/16/2021    PSA 1.99 06/16/2021     Lab Results   Component Value Date    CR 0.84 09/09/2024    CR 1.00 12/02/2022    CR 0.98 03/16/2022    CR 0.98 12/16/2021    CR 0.92 06/16/2021    CR 1.39 03/15/2018    CR 1.57 10/30/2015    CR 1.32 04/01/2011     Lab Results   Component Value Date    GFRESTIMATED >90 09/09/2024    GFRESTIMATED 79 12/02/2022    GFRESTIMATED 82 03/16/2022    GFRESTIMATED 77 12/16/2021    GFRESTIMATED 83 06/16/2021    GFRESTIMATED 51 03/15/2018    GFRESTIMATED 44 10/30/2015    GFRESTIMATED 55  04/01/2011

## 2025-05-13 NOTE — PROGRESS NOTES
"[facility]    I had the pleasure of seeing Tim when he came for follow up of heart block and syncope.  This 73 year old saw Dr. Cervantes for his history of:    1. Advanced HB with syncope 2011 - s/p [employer] PPM 4/2011. Generator replacement 9/9/2024  2. HTN, now with orthostasis  3. HL   4. Parkinson's - sees [facility]  5.  NSVT -noted on device check 12/2024     Last Visit & Interval History:  I saw Tim 9/2024 at which time he noted improved BPs, running >100 mmHg, after stopping lisinopril/hydrochlorothiazide.  He and Martha had continued to note falls despite this.  We reviewed his upcoming generator replacement and 6-month follow-up was recommended.    Routine device interrogation 12/2024 showed episode of NSVT lasting up to 10 seconds, rates up to 180s.  This had occurred 9/18 @ 12:25 PM, and was asymptomatic.  Dr. Kim reviewed and noted normal LVEFon echo done in [place] 2/2024.  Therefore, no changes were recommended.    In 3/2025, he had a fall and lost his balance while in [place], falling on his left/front side.  He injured his finger and was treated with a splint.    Today's Visit:  Tim thinks things are going well from a cardiac perspective. He's walking routinely without c/o CP, SOB. Will stop occasionally for CORTEZ, though note rate response     No c/o edema. No orthopnea, PND.     BPs at home are staying >100 mmHg on Metoprolol XL 50 mg daily    VITALS:  Vitals: /77   Pulse 65   Ht 1.854 m (6' 1\")   Wt 80.7 kg (178 lb)   BMI 23.48 kg/m      Diagnostic Testing:  Device interrogation today 21%AP and 99% in DDD 60/130 (changed to DDDR as had this prior to generator replacement). Underlying SR 60s with CBH. 14y battery. No arrhythmias.   Echo 2/2024 ([place]) LVEF 55-60%. No RWMA. Nl RV. Aortic sclerosis. Nl aorta.  Echo 1/2022 LVEF 50-55%. Grade I DD. No RWMA. Nl RV.Nl valves and nl RVSP  EKG 9/2021 showed ASVP  Echocardiogam 9/2019 showed EF 50-55%. Mild LVH. Nl RV. 1+ TR. RVSP " wnl 21mmHg. Borderline dilated root and ascending aorta  Component      Latest Ref Rng 12/2/2022  11:17 AM 9/9/2024  12:36 PM   WBC      4.0 - 11.0 10e3/uL 12.8 (H)  6.4    RBC Count      4.40 - 5.90 10e6/uL 4.57  5.02    Hemoglobin      13.3 - 17.7 g/dL 13.6  15.0    Hematocrit      40.0 - 53.0 % 41.9  45.2    MCV      78 - 100 fL 92  90    MCH      26.5 - 33.0 pg 29.8  29.9    MCHC      31.5 - 36.5 g/dL 32.5  33.2    RDW      10.0 - 15.0 % 13.4  13.0    Platelet Count      150 - 450 10e3/uL 250  107 (L)      Component      Latest Ref Rng 12/2/2022  11:17 AM 9/9/2024  12:36 PM   Sodium      135 - 145 mmol/L 138  140    Potassium      3.4 - 5.3 mmol/L 3.5  4.4    Chloride      98 - 107 mmol/L 99  107    Carbon Dioxide (CO2)      22 - 29 mmol/L 24  25    Anion Gap      7 - 15 mmol/L 15  8    Urea Nitrogen      8.0 - 23.0 mg/dL 26.4 (H)  23.4 (H)    Creatinine      0.67 - 1.17 mg/dL 1.00  0.84    Calcium      8.8 - 10.4 mg/dL 8.4 (L)  9.1    Glucose      70 - 99 mg/dL 98  99         Plan:  Annual follow-up with device check  Echo 1 year    Assessment/Plan:    Advanced Heart Block/Syncope  S/p [employer] PPM 4/2011 with generator replacement 9/9/2024   Device interrogation today as above shows normal function. Rate response turned on     PLAN:  Continue routine device interrogations  Echo 1 year    HTN, orthostasis  Amlodipine reduced 10/2022, stopped all together 11/2022.  Lisinopril/HCTZ discontinued 4/2024 and BPs improved but unfortunately continued to experience falls  Was in [place] 3/2025 and sustained a fall with finger injury.  BPs have >100 mmHg on plain Metoprolol XL 50 mg daily (for VT)    PLAN:  Continue fall precautions  Continue routine follow-up for Parkinson's/autonomic dysfunction  Let us know if SBP <100mmHg and we'll have to back off on Metoprolol XL     Oumou Casarez PA-C, MSPAS      Orders Placed This Encounter   Procedures    Follow-Up with Cardiology BERTA    Echocardiogram Complete     No  "orders of the defined types were placed in this encounter.    There are no discontinued medications.          Encounter Diagnoses   Name Primary?    Complete AV block (H)     Pacemaker at end of battery life            CURRENT MEDICATIONS:  Current Outpatient Medications   Medication Sig Dispense Refill    alfuzosin ER (UROXATRAL) 10 MG 24 hr tablet Take 1 tablet (10 mg) by mouth daily. 90 tablet 4    aspirin 81 MG tablet Take 81 mg by mouth daily      carbidopa-levodopa (SINEMET CR)  MG CR tablet Take 1 tablet by mouth At Bedtime      carbidopa-levodopa (SINEMET)  MG tablet TAKE 1&1/2 TABLETS BY MOUTH 4 TIMES DAILY      metoprolol succinate ER (TOPROL XL) 50 MG 24 hr tablet Take 1 tablet (50 mg) by mouth daily 90 tablet 3    Multiple Vitamins-Minerals (CENTRUM SILVER ADULT 50+ PO)       trospium (SANCTURA) 20 MG tablet Take 1 tablet (20 mg) by mouth 2 times daily (before meals). 180 tablet 4    alfuzosin ER (UROXATRAL) 10 MG 24 hr tablet Take 1 tablet (10 mg) by mouth daily (Patient not taking: Reported on 5/14/2025) 90 tablet 4    trospium (SANCTURA) 20 MG tablet Take 1 tablet (20 mg) by mouth 2 times daily (before meals). (Patient not taking: Reported on 5/14/2025) 120 tablet 0       ALLERGIES     Allergies   Allergen Reactions    Penicillins      hives    Statin Drugs [Statins]     Penicillamine Rash         Review of Systems:  Skin:  Negative     Eyes:  Positive for contacts  ENT:  Negative    Respiratory:  Negative for shortness of breath, dyspnea on exertion, cough  Cardiovascular:  Negative for, palpitations, chest pain, dizziness, fatigue, edema lightheadedness, Positive for  Gastroenterology: Negative for melena, hematochezia  Genitourinary:  Negative    Musculoskeletal:  Negative    Neurologic:  Positive for involuntary movement, tremors  Psychiatric:  Negative    Heme/Lymph/Imm:  Negative    Endocrine:  Negative      Physical Exam:  Vitals: /77   Pulse 65   Ht 1.854 m (6' 1\")   Wt " 80.7 kg (178 lb)   BMI 23.48 kg/m      Constitutional:  cooperative, alert and oriented, well developed, well nourished, in no acute distress        Skin:  warm and dry to the touch, no apparent skin lesions or masses noted        Head:  normocephalic, no masses or lesions        Eyes:  pupils equal and round, conjunctivae and lids unremarkable, sclera white, no xanthalasma        ENT:  no pallor or cyanosis, dentition good        Neck:  JVP normal, no carotid bruit        Chest:  normal breath sounds, clear to auscultation, normal A-P diameter, normal symmetry, normal respiratory excursion, no use of accessory muscles        Cardiac: regular rhythm, normal S1/S2, no S3 or S4, apical impulse not displaced, no murmurs, gallops or rubs                  Abdomen:  abdomen soft        Vascular: pulses full and equal                                      Extremities and Back:  no deformities, clubbing, cyanosis, erythema observed, no edema        Neurological:    tremor          PAST MEDICAL HISTORY:  Past Medical History:   Diagnosis Date    Complete AV block (H)     [employer] [place] [employer] dual chamber 4/1/2011    Hyperlipidemia     Hypertension     Mumps     Parkinson disease (H)     Parkinsons disease (H)     Syncope        PAST SURGICAL HISTORY:  Past Surgical History:   Procedure Laterality Date    EP PACEMAKER GENERATOR REPLACEMENT- DUAL N/A 9/9/2024    Procedure: Pacemaker Generator Replacement Dual;  Surgeon: Tonja Kim MD;  Location:  HEART CARDIAC CATH LAB    IMPLANT PACEMAKER         FAMILY HISTORY:  Family History   Problem Relation Age of Onset    C.A.D. Mother     C.A.D. Father        SOCIAL HISTORY:  Social History     Socioeconomic History    Marital status:      Spouse name: None    Number of children: None    Years of education: None    Highest education level: None   Tobacco Use    Smoking status: Never    Smokeless tobacco: Never   Substance and Sexual Activity    Alcohol use: Yes      Comment: glass of wine occ    Drug use: Not Currently   Other Topics Concern    Special Diet No    Exercise Yes     Comment: walking almost everyday     Social Drivers of Health     Financial Resource Strain: Low Risk  (10/16/2023)    Financial Resource Strain     Within the past 12 months, have you or your family members you live with been unable to get utilities (heat, electricity) when it was really needed?: No   Food Insecurity: Low Risk  (10/16/2023)    Food Insecurity     Within the past 12 months, did you worry that your food would run out before you got money to buy more?: No     Within the past 12 months, did the food you bought just not last and you didn t have money to get more?: No   Transportation Needs: Low Risk  (10/16/2023)    Transportation Needs     Within the past 12 months, has lack of transportation kept you from medical appointments, getting your medicines, non-medical meetings or appointments, work, or from getting things that you need?: No   Interpersonal Safety: Unknown (9/9/2024)    Interpersonal Safety     Do you feel physically and emotionally safe where you currently live?: Patient unable to answer     Within the past 12 months, have you been hit, slapped, kicked or otherwise physically hurt by someone?: Patient unable to answer     Within the past 12 months, have you been humiliated or emotionally abused in other ways by your partner or ex-partner?: Patient unable to answer   Housing Stability: Low Risk  (10/16/2023)    Housing Stability     Do you have housing? : Yes     Are you worried about losing your housing?: No

## 2025-05-14 ENCOUNTER — OFFICE VISIT (OUTPATIENT)
Dept: CARDIOLOGY | Facility: CLINIC | Age: 75
End: 2025-05-14
Attending: PHYSICIAN ASSISTANT
Payer: COMMERCIAL

## 2025-05-14 VITALS
BODY MASS INDEX: 23.59 KG/M2 | SYSTOLIC BLOOD PRESSURE: 120 MMHG | HEIGHT: 73 IN | HEART RATE: 65 BPM | WEIGHT: 178 LBS | DIASTOLIC BLOOD PRESSURE: 77 MMHG

## 2025-05-14 DIAGNOSIS — Z45.010 PACEMAKER AT END OF BATTERY LIFE: ICD-10-CM

## 2025-05-14 DIAGNOSIS — I44.2 COMPLETE AV BLOCK (H): ICD-10-CM

## 2025-05-14 NOTE — PATIENT INSTRUCTIONS
Tim - it was nice to see you and Martha today!    Today we reviewed:    Still with falls, thought likely due to Parkinson's  Pacer check today looked good ... Some setting changes  No significant drops in BP noted at home    MEDICATION CHANGES:    NONE  Keep an eye on BP ... If falling <100 mmHg routinely, will want to back off on Metoprolol XL  [phone]    RECOMMENDATIONS:    Continue device checks  Keep an eye on breathing ... If not improved with pacer changes or gets worse, let us know     FOLLOW UP:    1 year follow-up with Pacer Check and updated echocardiogram (done 2024 in [place])    IMPORTANT PHONE NUMBERS FOR  [facility] ([place]):    Device nurses: [phone]

## 2025-05-14 NOTE — LETTER
"5/14/2025    Angela Gao MD  [address]  [place] [address]    RE: Steven Tannino Dye       Dear Colleague,     I had the pleasure of seeing Steven Marylin Dye in the [facility].  [facility]    I had the pleasure of seeing Tim when he came for follow up of heart block and syncope.  This 73 year old saw Dr. Cervantes for his history of:    1. Advanced HB with syncope 2011 - s/p [employer] PPM 4/2011. Generator replacement 9/9/2024  2. HTN, now with orthostasis  3. HL   4. Parkinson's - sees [facility]  5.  NSVT -noted on device check 12/2024     Last Visit & Interval History:  I saw Tim 9/2024 at which time he noted improved BPs, running >100 mmHg, after stopping lisinopril/hydrochlorothiazide.  He and Martha had continued to note falls despite this.  We reviewed his upcoming generator replacement and 6-month follow-up was recommended.    Routine device interrogation 12/2024 showed episode of NSVT lasting up to 10 seconds, rates up to 180s.  This had occurred 9/18 @ 12:25 PM, and was asymptomatic.  Dr. Kim reviewed and noted normal LVEFon echo done in [place] 2/2024.  Therefore, no changes were recommended.    In 3/2025, he had a fall and lost his balance while in [place], falling on his left/front side.  He injured his finger and was treated with a splint.    Today's Visit:  Tim thinks things are going well from a cardiac perspective. He's walking routinely without c/o CP, SOB. Will stop occasionally for CORTEZ, though note rate response     No c/o edema. No orthopnea, PND.     BPs at home are staying >100 mmHg on Metoprolol XL 50 mg daily    VITALS:  Vitals: /77   Pulse 65   Ht 1.854 m (6' 1\")   Wt 80.7 kg (178 lb)   BMI 23.48 kg/m      Diagnostic Testing:  Device interrogation today 21%AP and 99% in DDD 60/130 (changed to DDDR as had this prior to generator replacement). Underlying SR 60s with CBH. 14y battery. No arrhythmias.   Echo 2/2024 ([place]) LVEF " 55-60%. No RWMA. Nl RV. Aortic sclerosis. Nl aorta.  Echo 1/2022 LVEF 50-55%. Grade I DD. No RWMA. Nl RV.Nl valves and nl RVSP  EKG 9/2021 showed ASVP  Echocardiogam 9/2019 showed EF 50-55%. Mild LVH. Nl RV. 1+ TR. RVSP wnl 21mmHg. Borderline dilated root and ascending aorta  Component      Latest Ref Rng 12/2/2022  11:17 AM 9/9/2024  12:36 PM   WBC      4.0 - 11.0 10e3/uL 12.8 (H)  6.4    RBC Count      4.40 - 5.90 10e6/uL 4.57  5.02    Hemoglobin      13.3 - 17.7 g/dL 13.6  15.0    Hematocrit      40.0 - 53.0 % 41.9  45.2    MCV      78 - 100 fL 92  90    MCH      26.5 - 33.0 pg 29.8  29.9    MCHC      31.5 - 36.5 g/dL 32.5  33.2    RDW      10.0 - 15.0 % 13.4  13.0    Platelet Count      150 - 450 10e3/uL 250  107 (L)      Component      Latest Ref Rng 12/2/2022  11:17 AM 9/9/2024  12:36 PM   Sodium      135 - 145 mmol/L 138  140    Potassium      3.4 - 5.3 mmol/L 3.5  4.4    Chloride      98 - 107 mmol/L 99  107    Carbon Dioxide (CO2)      22 - 29 mmol/L 24  25    Anion Gap      7 - 15 mmol/L 15  8    Urea Nitrogen      8.0 - 23.0 mg/dL 26.4 (H)  23.4 (H)    Creatinine      0.67 - 1.17 mg/dL 1.00  0.84    Calcium      8.8 - 10.4 mg/dL 8.4 (L)  9.1    Glucose      70 - 99 mg/dL 98  99         Plan:  Annual follow-up with device check  Echo 1 year    Assessment/Plan:    Advanced Heart Block/Syncope  S/p [employer] PPM 4/2011 with generator replacement 9/9/2024   Device interrogation today as above shows normal function. Rate response turned on     PLAN:  Continue routine device interrogations  Echo 1 year    HTN, orthostasis  Amlodipine reduced 10/2022, stopped all together 11/2022.  Lisinopril/HCTZ discontinued 4/2024 and BPs improved but unfortunately continued to experience falls  Was in [place] 3/2025 and sustained a fall with finger injury.  BPs have >100 mmHg on plain Metoprolol XL 50 mg daily (for VT)    PLAN:  Continue fall precautions  Continue routine follow-up for Parkinson's/autonomic  dysfunction  Let us know if SBP <100mmHg and we'll have to back off on Metoprolol XL     Oumou Casarez PA-C, MSPAS      Orders Placed This Encounter   Procedures     Follow-Up with Cardiology BERTA     Echocardiogram Complete     No orders of the defined types were placed in this encounter.    There are no discontinued medications.          Encounter Diagnoses   Name Primary?     Complete AV block (H)      Pacemaker at end of battery life            CURRENT MEDICATIONS:  Current Outpatient Medications   Medication Sig Dispense Refill     alfuzosin ER (UROXATRAL) 10 MG 24 hr tablet Take 1 tablet (10 mg) by mouth daily. 90 tablet 4     aspirin 81 MG tablet Take 81 mg by mouth daily       carbidopa-levodopa (SINEMET CR)  MG CR tablet Take 1 tablet by mouth At Bedtime       carbidopa-levodopa (SINEMET)  MG tablet TAKE 1&1/2 TABLETS BY MOUTH 4 TIMES DAILY       metoprolol succinate ER (TOPROL XL) 50 MG 24 hr tablet Take 1 tablet (50 mg) by mouth daily 90 tablet 3     Multiple Vitamins-Minerals (CENTRUM SILVER ADULT 50+ PO)        trospium (SANCTURA) 20 MG tablet Take 1 tablet (20 mg) by mouth 2 times daily (before meals). 180 tablet 4     alfuzosin ER (UROXATRAL) 10 MG 24 hr tablet Take 1 tablet (10 mg) by mouth daily (Patient not taking: Reported on 5/14/2025) 90 tablet 4     trospium (SANCTURA) 20 MG tablet Take 1 tablet (20 mg) by mouth 2 times daily (before meals). (Patient not taking: Reported on 5/14/2025) 120 tablet 0       ALLERGIES     Allergies   Allergen Reactions     Penicillins      hives     Statin Drugs [Statins]      Penicillamine Rash         Review of Systems:  Skin:  Negative     Eyes:  Positive for contacts  ENT:  Negative    Respiratory:  Negative for shortness of breath, dyspnea on exertion, cough  Cardiovascular:  Negative for, palpitations, chest pain, dizziness, fatigue, edema lightheadedness, Positive for  Gastroenterology: Negative for melena, hematochezia  Genitourinary:  Negative   "  Musculoskeletal:  Negative    Neurologic:  Positive for involuntary movement, tremors  Psychiatric:  Negative    Heme/Lymph/Imm:  Negative    Endocrine:  Negative      Physical Exam:  Vitals: /77   Pulse 65   Ht 1.854 m (6' 1\")   Wt 80.7 kg (178 lb)   BMI 23.48 kg/m      Constitutional:  cooperative, alert and oriented, well developed, well nourished, in no acute distress        Skin:  warm and dry to the touch, no apparent skin lesions or masses noted        Head:  normocephalic, no masses or lesions        Eyes:  pupils equal and round, conjunctivae and lids unremarkable, sclera white, no xanthalasma        ENT:  no pallor or cyanosis, dentition good        Neck:  JVP normal, no carotid bruit        Chest:  normal breath sounds, clear to auscultation, normal A-P diameter, normal symmetry, normal respiratory excursion, no use of accessory muscles        Cardiac: regular rhythm, normal S1/S2, no S3 or S4, apical impulse not displaced, no murmurs, gallops or rubs                  Abdomen:  abdomen soft        Vascular: pulses full and equal                                      Extremities and Back:  no deformities, clubbing, cyanosis, erythema observed, no edema        Neurological:    tremor          PAST MEDICAL HISTORY:  Past Medical History:   Diagnosis Date     Complete AV block (H)     [employer] dual chamber 4/1/2011     Hyperlipidemia      Hypertension      Mumps      Parkinson disease (H)      Parkinsons disease (H)      Syncope        PAST SURGICAL HISTORY:  Past Surgical History:   Procedure Laterality Date     EP PACEMAKER GENERATOR REPLACEMENT- DUAL N/A 9/9/2024    Procedure: Pacemaker Generator Replacement Dual;  Surgeon: Tonja Kim MD;  Location:  HEART CARDIAC CATH LAB     IMPLANT PACEMAKER         FAMILY HISTORY:  Family History   Problem Relation Age of Onset     C.A.D. Mother      C.A.D. Father        SOCIAL HISTORY:  Social History     Socioeconomic History "     Marital status:      Spouse name: None     Number of children: None     Years of education: None     Highest education level: None   Tobacco Use     Smoking status: Never     Smokeless tobacco: Never   Substance and Sexual Activity     Alcohol use: Yes     Comment: glass of wine occ     Drug use: Not Currently   Other Topics Concern     Special Diet No     Exercise Yes     Comment: walking almost everyday     Social Drivers of Health     Financial Resource Strain: Low Risk  (10/16/2023)    Financial Resource Strain      Within the past 12 months, have you or your family members you live with been unable to get utilities (heat, electricity) when it was really needed?: No   Food Insecurity: Low Risk  (10/16/2023)    Food Insecurity      Within the past 12 months, did you worry that your food would run out before you got money to buy more?: No      Within the past 12 months, did the food you bought just not last and you didn t have money to get more?: No   Transportation Needs: Low Risk  (10/16/2023)    Transportation Needs      Within the past 12 months, has lack of transportation kept you from medical appointments, getting your medicines, non-medical meetings or appointments, work, or from getting things that you need?: No   Interpersonal Safety: Unknown (9/9/2024)    Interpersonal Safety      Do you feel physically and emotionally safe where you currently live?: Patient unable to answer      Within the past 12 months, have you been hit, slapped, kicked or otherwise physically hurt by someone?: Patient unable to answer      Within the past 12 months, have you been humiliated or emotionally abused in other ways by your partner or ex-partner?: Patient unable to answer   Housing Stability: Low Risk  (10/16/2023)    Housing Stability      Do you have housing? : Yes      Are you worried about losing your housing?: No             Thank you for allowing me to participate in the care of your  patient.      Sincerely,     Janki Casarez PA-C     [facility]  cc:   Janki Casarez PA-C  [address] [place] [id number]

## 2025-08-13 ENCOUNTER — MYC MEDICAL ADVICE (OUTPATIENT)
Dept: CARDIOLOGY | Facility: CLINIC | Age: 75
End: 2025-08-13
Payer: COMMERCIAL

## 2025-08-18 ENCOUNTER — ANCILLARY PROCEDURE (OUTPATIENT)
Dept: CARDIOLOGY | Facility: CLINIC | Age: 75
End: 2025-08-18
Attending: INTERNAL MEDICINE
Payer: COMMERCIAL

## 2025-08-18 DIAGNOSIS — I44.2 ATRIOVENTRICULAR BLOCK, COMPLETE (H): ICD-10-CM

## 2025-08-18 DIAGNOSIS — Z95.0 CARDIAC PACEMAKER IN SITU: ICD-10-CM

## 2025-08-18 PROCEDURE — 93296 REM INTERROG EVL PM/IDS: CPT | Performed by: INTERNAL MEDICINE

## 2025-08-18 PROCEDURE — 93294 REM INTERROG EVL PM/LDLS PM: CPT | Performed by: INTERNAL MEDICINE

## 2025-08-19 LAB
MDC_IDC_EPISODE_DTM: NORMAL
MDC_IDC_EPISODE_DURATION: 13 S
MDC_IDC_EPISODE_ID: NORMAL
MDC_IDC_EPISODE_TYPE: NORMAL
MDC_IDC_LEAD_CONNECTION_STATUS: NORMAL
MDC_IDC_LEAD_CONNECTION_STATUS: NORMAL
MDC_IDC_LEAD_IMPLANT_DT: NORMAL
MDC_IDC_LEAD_IMPLANT_DT: NORMAL
MDC_IDC_LEAD_LOCATION: NORMAL
MDC_IDC_LEAD_LOCATION: NORMAL
MDC_IDC_LEAD_LOCATION_DETAIL_1: NORMAL
MDC_IDC_LEAD_LOCATION_DETAIL_1: NORMAL
MDC_IDC_LEAD_MFG: NORMAL
MDC_IDC_LEAD_MFG: NORMAL
MDC_IDC_LEAD_MODEL: NORMAL
MDC_IDC_LEAD_MODEL: NORMAL
MDC_IDC_LEAD_POLARITY_TYPE: NORMAL
MDC_IDC_LEAD_POLARITY_TYPE: NORMAL
MDC_IDC_LEAD_SERIAL: NORMAL
MDC_IDC_LEAD_SERIAL: NORMAL
MDC_IDC_MSMT_BATTERY_DTM: NORMAL
MDC_IDC_MSMT_BATTERY_REMAINING_LONGEVITY: 156 MO
MDC_IDC_MSMT_BATTERY_REMAINING_PERCENTAGE: 100 %
MDC_IDC_MSMT_BATTERY_STATUS: NORMAL
MDC_IDC_MSMT_LEADCHNL_RA_IMPEDANCE_VALUE: 752 OHM
MDC_IDC_MSMT_LEADCHNL_RA_PACING_THRESHOLD_AMPLITUDE: 0.4 V
MDC_IDC_MSMT_LEADCHNL_RA_PACING_THRESHOLD_PULSEWIDTH: 0.4 MS
MDC_IDC_MSMT_LEADCHNL_RV_IMPEDANCE_VALUE: 660 OHM
MDC_IDC_MSMT_LEADCHNL_RV_PACING_THRESHOLD_AMPLITUDE: 0.9 V
MDC_IDC_MSMT_LEADCHNL_RV_PACING_THRESHOLD_PULSEWIDTH: 0.4 MS
MDC_IDC_PG_IMPLANT_DTM: NORMAL
MDC_IDC_PG_MFG: NORMAL
MDC_IDC_PG_MODEL: NORMAL
MDC_IDC_PG_SERIAL: NORMAL
MDC_IDC_PG_TYPE: NORMAL
MDC_IDC_SESS_CLINIC_NAME: NORMAL
MDC_IDC_SESS_DTM: NORMAL
MDC_IDC_SESS_TYPE: NORMAL
MDC_IDC_SET_BRADY_AT_MODE_SWITCH_MODE: NORMAL
MDC_IDC_SET_BRADY_AT_MODE_SWITCH_RATE: 170 {BEATS}/MIN
MDC_IDC_SET_BRADY_LOWRATE: 60 {BEATS}/MIN
MDC_IDC_SET_BRADY_MAX_SENSOR_RATE: 130 {BEATS}/MIN
MDC_IDC_SET_BRADY_MAX_TRACKING_RATE: 130 {BEATS}/MIN
MDC_IDC_SET_BRADY_MODE: NORMAL
MDC_IDC_SET_BRADY_PAV_DELAY_HIGH: 180 MS
MDC_IDC_SET_BRADY_PAV_DELAY_LOW: 250 MS
MDC_IDC_SET_BRADY_SAV_DELAY_HIGH: 180 MS
MDC_IDC_SET_BRADY_SAV_DELAY_LOW: 250 MS
MDC_IDC_SET_LEADCHNL_RA_PACING_AMPLITUDE: 2 V
MDC_IDC_SET_LEADCHNL_RA_PACING_CAPTURE_MODE: NORMAL
MDC_IDC_SET_LEADCHNL_RA_PACING_POLARITY: NORMAL
MDC_IDC_SET_LEADCHNL_RA_PACING_PULSEWIDTH: 0.4 MS
MDC_IDC_SET_LEADCHNL_RA_SENSING_ADAPTATION_MODE: NORMAL
MDC_IDC_SET_LEADCHNL_RA_SENSING_POLARITY: NORMAL
MDC_IDC_SET_LEADCHNL_RA_SENSING_SENSITIVITY: 0.5 MV
MDC_IDC_SET_LEADCHNL_RV_PACING_AMPLITUDE: 1.3 V
MDC_IDC_SET_LEADCHNL_RV_PACING_CAPTURE_MODE: NORMAL
MDC_IDC_SET_LEADCHNL_RV_PACING_POLARITY: NORMAL
MDC_IDC_SET_LEADCHNL_RV_PACING_PULSEWIDTH: 0.4 MS
MDC_IDC_SET_LEADCHNL_RV_SENSING_ADAPTATION_MODE: NORMAL
MDC_IDC_SET_LEADCHNL_RV_SENSING_POLARITY: NORMAL
MDC_IDC_SET_LEADCHNL_RV_SENSING_SENSITIVITY: 2.5 MV
MDC_IDC_SET_ZONE_DETECTION_INTERVAL: 375 MS
MDC_IDC_SET_ZONE_STATUS: NORMAL
MDC_IDC_SET_ZONE_TYPE: NORMAL
MDC_IDC_SET_ZONE_VENDOR_TYPE: NORMAL
MDC_IDC_STAT_AT_BURDEN_PERCENT: 1 %
MDC_IDC_STAT_AT_DTM_END: NORMAL
MDC_IDC_STAT_AT_DTM_START: NORMAL
MDC_IDC_STAT_BRADY_DTM_END: NORMAL
MDC_IDC_STAT_BRADY_DTM_START: NORMAL
MDC_IDC_STAT_BRADY_RA_PERCENT_PACED: 17 %
MDC_IDC_STAT_BRADY_RV_PERCENT_PACED: 99 %
MDC_IDC_STAT_EPISODE_RECENT_COUNT: 0
MDC_IDC_STAT_EPISODE_RECENT_COUNT_DTM_END: NORMAL
MDC_IDC_STAT_EPISODE_RECENT_COUNT_DTM_START: NORMAL
MDC_IDC_STAT_EPISODE_TYPE: NORMAL
MDC_IDC_STAT_EPISODE_VENDOR_TYPE: NORMAL
MDC_IDC_STAT_EPISODE_VENDOR_TYPE: NORMAL

## (undated) DEVICE — PACK PCMKR PERM SRG PROC LF SAN32PC573

## (undated) DEVICE — 9CM X 15CM, AQUACEL AG ADVANTAGE SURGICAL COVER DRESSING

## (undated) DEVICE — DEFIB PRO-PADZ LVP LQD GEL ADULT 8900-2105-01

## (undated) RX ORDER — FENTANYL CITRATE 50 UG/ML
INJECTION, SOLUTION INTRAMUSCULAR; INTRAVENOUS
Status: DISPENSED
Start: 2024-09-09

## (undated) RX ORDER — BUPIVACAINE HYDROCHLORIDE 2.5 MG/ML
INJECTION, SOLUTION EPIDURAL; INFILTRATION; INTRACAUDAL
Status: DISPENSED
Start: 2024-09-09

## (undated) RX ORDER — LIDOCAINE HYDROCHLORIDE 10 MG/ML
INJECTION, SOLUTION EPIDURAL; INFILTRATION; INTRACAUDAL; PERINEURAL
Status: DISPENSED
Start: 2024-09-09